# Patient Record
Sex: FEMALE | Race: WHITE | NOT HISPANIC OR LATINO | Employment: FULL TIME | URBAN - NONMETROPOLITAN AREA
[De-identification: names, ages, dates, MRNs, and addresses within clinical notes are randomized per-mention and may not be internally consistent; named-entity substitution may affect disease eponyms.]

---

## 2017-05-01 ENCOUNTER — OFFICE VISIT (OUTPATIENT)
Dept: RETAIL CLINIC | Facility: CLINIC | Age: 37
End: 2017-05-01

## 2017-05-01 VITALS
WEIGHT: 275 LBS | BODY MASS INDEX: 46.95 KG/M2 | HEART RATE: 110 BPM | TEMPERATURE: 99.3 F | OXYGEN SATURATION: 97 % | RESPIRATION RATE: 20 BRPM | HEIGHT: 64 IN

## 2017-05-01 DIAGNOSIS — J40 BRONCHITIS: Primary | ICD-10-CM

## 2017-05-01 PROCEDURE — 99203 OFFICE O/P NEW LOW 30 MIN: CPT | Performed by: NURSE PRACTITIONER

## 2017-05-01 PROCEDURE — 94640 AIRWAY INHALATION TREATMENT: CPT | Performed by: NURSE PRACTITIONER

## 2017-05-01 RX ORDER — AMOXICILLIN AND CLAVULANATE POTASSIUM 875; 125 MG/1; MG/1
1 TABLET, FILM COATED ORAL 2 TIMES DAILY
Qty: 20 TABLET | Refills: 0 | Status: SHIPPED | OUTPATIENT
Start: 2017-05-01 | End: 2017-05-11

## 2017-05-01 RX ORDER — BENZONATATE 100 MG/1
100 CAPSULE ORAL 3 TIMES DAILY PRN
Qty: 30 CAPSULE | Refills: 0 | Status: SHIPPED | OUTPATIENT
Start: 2017-05-01 | End: 2017-05-11

## 2017-05-01 RX ORDER — ALBUTEROL SULFATE 90 UG/1
2 AEROSOL, METERED RESPIRATORY (INHALATION) EVERY 4 HOURS PRN
Qty: 1 INHALER | Refills: 0 | Status: SHIPPED | OUTPATIENT
Start: 2017-05-01 | End: 2017-05-11

## 2017-05-01 RX ADMIN — Medication 0.5 MG: at 12:45

## 2017-07-27 ENCOUNTER — OFFICE VISIT (OUTPATIENT)
Dept: FAMILY MEDICINE CLINIC | Facility: CLINIC | Age: 37
End: 2017-07-27

## 2017-07-27 VITALS
WEIGHT: 266 LBS | DIASTOLIC BLOOD PRESSURE: 78 MMHG | HEIGHT: 64 IN | OXYGEN SATURATION: 98 % | HEART RATE: 92 BPM | BODY MASS INDEX: 45.41 KG/M2 | SYSTOLIC BLOOD PRESSURE: 112 MMHG

## 2017-07-27 DIAGNOSIS — K76.0 FATTY LIVER: ICD-10-CM

## 2017-07-27 DIAGNOSIS — E03.9 ACQUIRED HYPOTHYROIDISM: ICD-10-CM

## 2017-07-27 DIAGNOSIS — N92.6 IRREGULAR MENSES: ICD-10-CM

## 2017-07-27 DIAGNOSIS — E66.01 MORBID OBESITY, UNSPECIFIED OBESITY TYPE (HCC): ICD-10-CM

## 2017-07-27 DIAGNOSIS — R51.9 CHRONIC NONINTRACTABLE HEADACHE, UNSPECIFIED HEADACHE TYPE: ICD-10-CM

## 2017-07-27 DIAGNOSIS — G47.33 OSA (OBSTRUCTIVE SLEEP APNEA): ICD-10-CM

## 2017-07-27 DIAGNOSIS — R23.2 HOT FLASHES: ICD-10-CM

## 2017-07-27 DIAGNOSIS — IMO0002 UNCONTROLLED TYPE 2 DIABETES MELLITUS WITH DIABETIC NEUROPATHY, WITHOUT LONG-TERM CURRENT USE OF INSULIN: Primary | ICD-10-CM

## 2017-07-27 DIAGNOSIS — G89.29 CHRONIC NONINTRACTABLE HEADACHE, UNSPECIFIED HEADACHE TYPE: ICD-10-CM

## 2017-07-27 LAB — GLUCOSE BLDC GLUCOMTR-MCNC: 240 MG/DL (ref 70–130)

## 2017-07-27 PROCEDURE — 99204 OFFICE O/P NEW MOD 45 MIN: CPT | Performed by: FAMILY MEDICINE

## 2017-07-27 PROCEDURE — 82962 GLUCOSE BLOOD TEST: CPT | Performed by: FAMILY MEDICINE

## 2017-07-27 RX ORDER — GLIPIZIDE 10 MG/1
10 TABLET, FILM COATED, EXTENDED RELEASE ORAL DAILY
Refills: 0 | COMMUNITY
Start: 2017-07-11 | End: 2018-04-20 | Stop reason: SDUPTHER

## 2017-07-27 RX ORDER — SERTRALINE HYDROCHLORIDE 100 MG/1
100 TABLET, FILM COATED ORAL DAILY
Refills: 0 | COMMUNITY
Start: 2017-07-11 | End: 2018-04-20 | Stop reason: SDUPTHER

## 2017-07-27 RX ORDER — LEVOTHYROXINE SODIUM 200 MCG
200 TABLET ORAL DAILY
Refills: 0 | COMMUNITY
Start: 2017-07-11 | End: 2018-04-20 | Stop reason: SDUPTHER

## 2017-07-27 NOTE — PROGRESS NOTES
"Subjective   Tawanna Jenifer Arellano is a 37 y.o. female.     History of Present Illness  Ms. Arellano presents today as a new pt to establish care. SHe was previously followed by Roge Merritt. She has several chronic conditions in need of f/u. She was last seen by previous PCP 3-4 months ago. She recalls her last A1c was 11.    Diabetes Mellitus Type II, Follow-up: Patient here for follow-up of Type 2 diabetes mellitus.  Current symptoms/problems include hyperglycemia and paresthesia of the feet and have been worsening. Symptoms have been present for several months.      Known diabetic complications: peripheral neuropathy  Cardiovascular risk factors: diabetes mellitus, dyslipidemia, obesity (BMI >= 30 kg/m2) and sedentary lifestyle  Current diabetic medications include glipizide, metformin. Was not able to afford Invokana or Jardiance.  SHe has assoc'd h/o fatty liver.    Eye exam current (within one year): unknown  Weight trend: increasing steadily  Prior visit with dietician: no  Current diet: in general, a \"healthy\" diet    Current exercise: minimal walking    Current monitoring regimen: none   Any episodes of hypoglycemia? no    Is She on ACE inhibitor or angiotensin II receptor blocker?   No   Also not currently on a statin.    Hypothyroidism: Patient presents for evaluation of thyroid function. Symptoms consist of fatigue, weight gain, change in skin,  nails, or hair, heat intolerance, depression, amenorrhea. Symptoms have present for several years. The symptoms are moderate.  The problem has been waxing and waning.  Previous thyroid studies include TSH, triiodothyronine free and free thyroxine. The hypothyroidism is due to thyroidectomy. SHe is taking replacement as rx'd. DUe for recheck TSH.    She has h/o chronic obesity assoc'd with ARIEL. She had last sleep study several years ago. Was not able to tolerate CPAP at that time. SHe c/o chronic fatigue, hot flashes, chronic bitemporal headaches. Denies chronic sore " "throat. She has suspected h/o PCOS although she has never \"officially\" been given dx. She has h/o chronic irregular menses with recurrent secondary amenorrhea, hirsuitism, DM, etc.    The following portions of the patient's history were reviewed and updated as appropriate: allergies, current medications, past family history, past medical history, past social history, past surgical history and problem list.    Review of Systems   Constitutional: Positive for fatigue and unexpected weight change. Negative for appetite change and fever.   HENT: Negative for congestion, ear pain, hearing loss, nosebleeds, rhinorrhea, sneezing, sore throat, tinnitus and trouble swallowing.    Eyes: Positive for visual disturbance. Negative for pain, discharge and redness.   Respiratory: Negative for cough, chest tightness, shortness of breath and wheezing.    Cardiovascular: Negative for chest pain, palpitations and leg swelling.   Gastrointestinal: Negative for abdominal pain, blood in stool, constipation, diarrhea, nausea and vomiting.   Endocrine: Positive for heat intolerance. Negative for cold intolerance, polydipsia and polyuria.   Genitourinary: Negative for dysuria, flank pain, frequency, hematuria and urgency.   Musculoskeletal: Negative for arthralgias, back pain, joint swelling, myalgias and neck pain.   Skin: Negative for rash and wound.   Allergic/Immunologic: Negative for food allergies.   Neurological: Positive for weakness, numbness and headaches. Negative for dizziness, tremors, seizures, syncope and speech difficulty.   Hematological: Negative for adenopathy. Does not bruise/bleed easily.   Psychiatric/Behavioral: Positive for dysphoric mood and sleep disturbance. Negative for confusion and suicidal ideas. The patient is nervous/anxious.        Objective    Vitals:    07/27/17 0935   BP: 112/78   Pulse: 92   SpO2: 98%     Body mass index is 45.66 kg/(m^2).  Last 2 weights    07/27/17  0935   Weight: 266 lb (121 kg) "       Physical Exam   Constitutional: She is oriented to person, place, and time. She appears well-developed and well-nourished. She is cooperative. She does not appear ill. No distress.   Morbidly obese   HENT:   Head: Normocephalic and atraumatic.   Right Ear: Tympanic membrane, external ear and ear canal normal.   Left Ear: Tympanic membrane, external ear and ear canal normal.   Nose: Nose normal.   Mouth/Throat: Oropharynx is clear and moist and mucous membranes are normal. Mucous membranes are not dry. No oral lesions. No posterior oropharyngeal erythema.   Mallampati Class 3/4   Eyes: Conjunctivae, EOM and lids are normal. Right pupil is round and reactive. Left pupil is round and reactive. Pupils are unequal (right chronically larger than left).   Neck: Phonation normal. Neck supple. Normal carotid pulses present. No thyroid mass and no thyromegaly present.   thyroidectomy scar noted   Cardiovascular: Normal rate, regular rhythm, normal heart sounds and intact distal pulses.    Pulmonary/Chest: Effort normal and breath sounds normal.   Musculoskeletal:        Thoracic back: She exhibits deformity (kyphoptic posture).    Tawanna had a diabetic foot exam performed today.   During the foot exam she had a monofilament test performed (normal).    Vascular Status -  Her exam exhibits no right foot edema. Her exam exhibits no left foot edema.   Skin Integrity  -  Her right foot skin is intact.  She has callous right foot.  She hasno right foot ulcer.    Tawanna 's left foot skin is intact. She has callous left foot. She has no left foot ulcer..  Lymphadenopathy:     She has no cervical adenopathy.        Right: No supraclavicular adenopathy present.        Left: No supraclavicular adenopathy present.   Neurological: She is alert and oriented to person, place, and time. She has normal strength. She displays no tremor. No cranial nerve deficit or sensory deficit. Gait normal.   Skin: Skin is warm and dry. No bruising and  no rash noted.   Psychiatric: She has a normal mood and affect. Her speech is normal and behavior is normal. Thought content normal. Cognition and memory are normal.   Nursing note and vitals reviewed.      Assessment/Plan   Tawanna was seen today for establish care and tingling.    Diagnoses and all orders for this visit:    Uncontrolled type 2 diabetes mellitus with diabetic neuropathy, without long-term current use of insulin  -     CBC (No Diff)  -     Comprehensive Metabolic Panel  -     Hemoglobin A1c  -     Microalbumin / Creatinine Urine Ratio  -     Cortisol  -     linagliptin (TRADJENTA) 5 MG tablet tablet; Take 1 tablet by mouth Daily.  -     Ambulatory Referral to Diabetic Education    Acquired hypothyroidism  -     TSH  -     T4, Free  -     T3, Free  -     Ambulatory Referral to Diabetic Education    Morbid obesity, unspecified obesity type  -     Comprehensive Metabolic Panel  -     FSH & LH  -     Cortisol  -     Ambulatory Referral to Diabetic Education    Chronic nonintractable headache, unspecified headache type  -     CBC (No Diff)  -     Comprehensive Metabolic Panel  -     FSH & LH  -     Cortisol  -     Prolactin  -     Progesterone  -     Estradiol  -     17-Hydroxyprogesterone    Irregular menses  -     FSH & LH  -     Cortisol  -     Progesterone  -     Estradiol  -     17-Hydroxyprogesterone    Fatty liver  -     Comprehensive Metabolic Panel  -     Ambulatory Referral to Diabetic Education    ARIEL (obstructive sleep apnea)    Hot flashes  -     CBC (No Diff)  -     Comprehensive Metabolic Panel  -     FSH & LH  -     Cortisol  -     Prolactin  -     Progesterone  -     Estradiol  -     17-Hydroxyprogesterone    Uncontrolled NIDDM. Have rec'd we recheck A1c. I have reviewed risks/benefits and potential side effects of various treatment options. Pt voices understanding and wishes to proceed with trial of tradjenta. Re-inforced need for diabetic appropriate diet. Refer to diabetic education.  She is aware she may need basal insulin pending A1c review.    Hypothyroidism of unknown status. Has h/o thyroid cancer and radioactive iodine tx. Needs suppressed TSH.    Morbid obesity with suspected ARIEL as well as h/o fatty liver. Will review records. Will likely need f/u sleep study. As she has assoc'd headache, heat intolerance, etc- lab eval to r/o pituitary tumor. Nutrition referral as above.    I will contact patient regarding test results and provide instructions regarding any necessary changes in plan of care.  Routine f/u in 1 month, sooner as needed/instructed.  Records requested from previous primary provider as well as any consulting physician, admitting hospitals, etc. Further recommendations pending review.  Patient was encouraged to keep me informed of any acute changes, lack of improvement, or any new concerning symptoms.  Pt is aware of reasons to seek emergent care.  Patient voiced understanding of all instructions and denied further questions.

## 2017-08-02 LAB
17OHP SERPL-MCNC: 19 NG/DL
ALBUMIN SERPL-MCNC: 4 G/DL (ref 3.5–5)
ALBUMIN/CREAT UR: <7.8 MG/G CREAT (ref 0–30)
ALBUMIN/GLOB SERPL: 1.3 G/DL (ref 1–2)
ALP SERPL-CCNC: 91 U/L (ref 38–126)
ALT SERPL-CCNC: 50 U/L (ref 13–69)
AST SERPL-CCNC: 34 U/L (ref 15–46)
BILIRUB SERPL-MCNC: 0.5 MG/DL (ref 0.2–1.3)
BUN SERPL-MCNC: 10 MG/DL (ref 7–20)
BUN/CREAT SERPL: 16.7 (ref 7.1–23.5)
CALCIUM SERPL-MCNC: 9.2 MG/DL (ref 8.4–10.2)
CHLORIDE SERPL-SCNC: 99 MMOL/L (ref 98–107)
CO2 SERPL-SCNC: 27 MMOL/L (ref 26–30)
CORTIS SERPL-MCNC: 5.1 UG/DL
CREAT SERPL-MCNC: 0.6 MG/DL (ref 0.6–1.3)
CREAT UR-MCNC: 38.3 MG/DL
ERYTHROCYTE [DISTWIDTH] IN BLOOD BY AUTOMATED COUNT: 14.6 % (ref 11.5–14.5)
ESTRADIOL SERPL-MCNC: 30.3 PG/ML
FSH SERPL-ACNC: 5.2 MIU/ML
GLOBULIN SER CALC-MCNC: 3 GM/DL
GLUCOSE SERPL-MCNC: 216 MG/DL (ref 74–98)
HBA1C MFR BLD: 9.5 %
HCT VFR BLD AUTO: 43.5 % (ref 37–47)
HGB BLD-MCNC: 13.6 G/DL (ref 12–16)
LH SERPL-ACNC: 9.1 MIU/ML
MCH RBC QN AUTO: 26.1 PG (ref 27–31)
MCHC RBC AUTO-ENTMCNC: 31.3 G/DL (ref 30–37)
MCV RBC AUTO: 83.5 FL (ref 81–99)
MICROALBUMIN UR-MCNC: <3 UG/ML
PLATELET # BLD AUTO: 194 10*3/MM3 (ref 130–400)
POTASSIUM SERPL-SCNC: 4.2 MMOL/L (ref 3.5–5.1)
PROGEST SERPL-MCNC: <0.1 NG/ML
PROLACTIN SERPL-MCNC: 9.5 NG/ML (ref 4.8–23.3)
PROT SERPL-MCNC: 7 G/DL (ref 6.3–8.2)
RBC # BLD AUTO: 5.21 10*6/MM3 (ref 4.2–5.4)
SODIUM SERPL-SCNC: 139 MMOL/L (ref 137–145)
T3FREE SERPL-MCNC: 3 PG/ML (ref 2–4.4)
T4 FREE SERPL-MCNC: 1.67 NG/DL (ref 0.78–2.19)
TSH SERPL DL<=0.005 MIU/L-ACNC: 1.45 MIU/ML (ref 0.47–4.68)
WBC # BLD AUTO: 12.27 10*3/MM3 (ref 4.8–10.8)

## 2017-08-03 ENCOUNTER — TELEPHONE (OUTPATIENT)
Dept: FAMILY MEDICINE CLINIC | Facility: CLINIC | Age: 37
End: 2017-08-03

## 2017-08-03 DIAGNOSIS — E03.9 ACQUIRED HYPOTHYROIDISM: Primary | ICD-10-CM

## 2017-08-03 RX ORDER — LEVOTHYROXINE SODIUM 200 MCG
200 TABLET ORAL DAILY
Qty: 90 TABLET | Refills: 0 | Status: CANCELLED | OUTPATIENT
Start: 2017-08-03

## 2017-08-03 RX ORDER — SERTRALINE HYDROCHLORIDE 100 MG/1
100 TABLET, FILM COATED ORAL DAILY
Qty: 180 TABLET | Refills: 0 | Status: CANCELLED | OUTPATIENT
Start: 2017-08-03

## 2017-08-03 RX ORDER — GLIPIZIDE 10 MG/1
10 TABLET, FILM COATED, EXTENDED RELEASE ORAL DAILY
Qty: 90 TABLET | Refills: 0 | Status: CANCELLED | OUTPATIENT
Start: 2017-08-03

## 2017-08-03 NOTE — TELEPHONE ENCOUNTER
I spoke with the pharmacy and they stated that it dose not require a PA, that her insurance has a high deduct able and until its met it will not be any cheaper.          ----- Message from Tawanna Arellano sent at 8/3/2017 10:33 AM EDT -----  Regarding: RE: Prescription Question  Contact: [phone]  Thank you  ----- Message -----  From: Alia Shah MD  Sent: 8/3/2017  8:01 AM EDT  To: Tawanna Arellano  Subject: RE: Prescription Question    I will have Amee check your insurance formulary and we'll see what else we can do.      ----- Message -----     From: Tawanna Arellano     Sent: 8/2/2017  6:03 PM EDT       To: Alia Shah MD  Subject: Prescription Question    Alpesh Shah,         I got the results of my labs. I wanted to let you know that I was not able to get the prescription for tradjenta. It too was over $300. I did see that my A1C was 9.5 and that's down from 11.         Tawanna

## 2017-08-03 NOTE — TELEPHONE ENCOUNTER
Please check diabetes meds on pt's formulary (or have pt do so) as she was not able to afford the Tradjenta ($300).

## 2017-08-06 NOTE — TELEPHONE ENCOUNTER
Please let pt know the situation. She will need to let us know what meds are preferred for diabetes on her plan.

## 2017-08-21 PROBLEM — E55.9 VITAMIN D DEFICIENCY: Status: ACTIVE | Noted: 2017-08-21

## 2017-08-29 ENCOUNTER — OFFICE VISIT (OUTPATIENT)
Dept: FAMILY MEDICINE CLINIC | Facility: CLINIC | Age: 37
End: 2017-08-29

## 2017-08-29 VITALS
OXYGEN SATURATION: 97 % | DIASTOLIC BLOOD PRESSURE: 80 MMHG | SYSTOLIC BLOOD PRESSURE: 112 MMHG | HEART RATE: 93 BPM | BODY MASS INDEX: 45.24 KG/M2 | WEIGHT: 265 LBS | HEIGHT: 64 IN

## 2017-08-29 DIAGNOSIS — IMO0002 UNCONTROLLED TYPE 2 DIABETES MELLITUS WITH DIABETIC NEUROPATHY, WITHOUT LONG-TERM CURRENT USE OF INSULIN: Primary | ICD-10-CM

## 2017-08-29 PROCEDURE — 99213 OFFICE O/P EST LOW 20 MIN: CPT | Performed by: FAMILY MEDICINE

## 2017-08-29 NOTE — PROGRESS NOTES
Subjective   Tawanna Jenifer Arellano is a 37 y.o. female.     History of Present Illness  Mrs. Arellano presents today for f/u on diabetes. She was seen as new pt last month. Had updated labs and would like to review those. She was not able to afford the Januvia or Tradjenta as she has a high pharmacy copay with her insurance. She would like info about other options. She is otherwise taking meds as rx'd.     Last note reviewed.    The following portions of the patient's history were reviewed and updated as appropriate: allergies, current medications, past family history, past medical history, past social history, past surgical history and problem list.    Review of Systems   Constitutional: Positive for fatigue. Negative for appetite change and fever.   HENT: Negative for congestion, rhinorrhea, sore throat and trouble swallowing.    Respiratory: Negative for cough, shortness of breath and wheezing.    Cardiovascular: Negative for chest pain, palpitations and leg swelling.   Gastrointestinal: Negative for abdominal pain, diarrhea, nausea and vomiting.   Endocrine: Positive for heat intolerance. Negative for cold intolerance, polydipsia and polyuria.   Genitourinary: Negative for dysuria, frequency and hematuria.   Skin: Negative for rash and wound.   Neurological: Positive for weakness, numbness and headaches. Negative for dizziness, tremors and syncope.   Psychiatric/Behavioral: Positive for dysphoric mood and sleep disturbance. Negative for confusion and suicidal ideas. The patient is nervous/anxious.        Objective    Vitals:    08/29/17 1013   BP: 112/80   Pulse: 93   SpO2: 97%     Body mass index is 45.49 kg/(m^2).  Last 2 weights    08/29/17  1013   Weight: 265 lb (120 kg)       Physical Exam   Constitutional: She is oriented to person, place, and time. She appears well-developed and well-nourished. She is cooperative. She does not appear ill. No distress.   obese   HENT:   Mouth/Throat: Mucous membranes are normal.  Mucous membranes are not dry.   Neck: No thyroid mass and no thyromegaly present.   Cardiovascular: Normal rate, regular rhythm and intact distal pulses.    Pulmonary/Chest: Effort normal and breath sounds normal.       Vascular Status -  Her exam exhibits no right foot edema. Her exam exhibits no left foot edema.  Lymphadenopathy:     She has no cervical adenopathy.   Neurological: She is alert and oriented to person, place, and time. She displays no tremor. Gait normal.   Skin: Skin is warm and dry.   Psychiatric: She has a normal mood and affect. Her behavior is normal.   Nursing note and vitals reviewed.    Recent Results (from the past 840 hour(s))   CBC (No Diff)    Collection Time: 07/27/17 10:28 AM   Result Value Ref Range    WBC 12.27 (H) 4.80 - 10.80 10*3/mm3    RBC 5.21 4.20 - 5.40 10*6/mm3    Hemoglobin 13.6 12.0 - 16.0 g/dL    Hematocrit 43.5 37.0 - 47.0 %    MCV 83.5 81.0 - 99.0 fL    MCH 26.1 (L) 27.0 - 31.0 pg    MCHC 31.3 30.0 - 37.0 g/dL    RDW 14.6 (H) 11.5 - 14.5 %    Platelets 194 130 - 400 10*3/mm3   Comprehensive Metabolic Panel    Collection Time: 07/27/17 10:28 AM   Result Value Ref Range    Glucose 216 (H) 74 - 98 mg/dL    BUN 10 7 - 20 mg/dL    Creatinine 0.60 0.60 - 1.30 mg/dL    eGFR Non African Am 112 >60 mL/min/1.73    eGFR African Am 136 >60 mL/min/1.73    BUN/Creatinine Ratio 16.7 7.1 - 23.5    Sodium 139 137 - 145 mmol/L    Potassium 4.2 3.5 - 5.1 mmol/L    Chloride 99 98 - 107 mmol/L    Total CO2 27.0 26.0 - 30.0 mmol/L    Calcium 9.2 8.4 - 10.2 mg/dL    Total Protein 7.0 6.3 - 8.2 g/dL    Albumin 4.00 3.50 - 5.00 g/dL    Globulin 3.0 gm/dL    A/G Ratio 1.3 1.0 - 2.0 g/dL    Total Bilirubin 0.5 0.2 - 1.3 mg/dL    Alkaline Phosphatase 91 38 - 126 U/L    AST (SGOT) 34 15 - 46 U/L    ALT (SGPT) 50 13 - 69 U/L   Hemoglobin A1c    Collection Time: 07/27/17 10:28 AM   Result Value Ref Range    Hemoglobin A1C 9.50 %   Microalbumin / Creatinine Urine Ratio    Collection Time: 07/27/17  10:28 AM   Result Value Ref Range    Creatinine, Urine 38.3 Not Estab. mg/dL    Microalbumin, Urine <3.0 Not Estab. ug/mL    Microalbumin/Creatinine Ratio Urine <7.8 0.0 - 30.0 mg/g creat   FSH & LH    Collection Time: 07/27/17 10:28 AM   Result Value Ref Range    LH 9.1 mIU/mL    FSH 5.2 mIU/mL   Cortisol    Collection Time: 07/27/17 10:28 AM   Result Value Ref Range    Cortisol 5.1 ug/dL   Prolactin    Collection Time: 07/27/17 10:28 AM   Result Value Ref Range    Prolactin 9.5 4.8 - 23.3 ng/mL   TSH    Collection Time: 07/27/17 10:28 AM   Result Value Ref Range    TSH 1.450 0.470 - 4.680 mIU/mL   T4, Free    Collection Time: 07/27/17 10:28 AM   Result Value Ref Range    Free T4 1.67 0.78 - 2.19 ng/dL   T3, Free    Collection Time: 07/27/17 10:28 AM   Result Value Ref Range    T3, Free 3.0 2.0 - 4.4 pg/mL   Progesterone    Collection Time: 07/27/17 10:28 AM   Result Value Ref Range    Progesterone <0.1 ng/mL   Estradiol    Collection Time: 07/27/17 10:28 AM   Result Value Ref Range    Estradiol 30.3 pg/mL   17-Hydroxyprogesterone    Collection Time: 07/27/17 10:28 AM   Result Value Ref Range    17-Hydroxyprogesterone 19 ng/dL   POC Glucose Fingerstick    Collection Time: 07/27/17 11:28 AM   Result Value Ref Range    Glucose 240 (A) 70 - 130 mg/dL       Assessment/Plan   Tawanna was seen today for diabetes.    Diagnoses and all orders for this visit:    Uncontrolled type 2 diabetes mellitus with diabetic neuropathy, without long-term current use of insulin  -     Canagliflozin (INVOKANA) 100 MG tablet; Take 1 tablet by mouth Daily.    Uncontrolled diabetes with limited resources in regard payment for medications. A1c has dropped to 9.5 from previous of 11. She will continue glipizide and metformin. As she did well on Invokana previously have sent in new rx and printed pt assistance form for her as well to review with pharmacist. She will let me know if she is unable to fill.    Routine f/u in 2 months with repeat A1c  at that time.   Patient was encouraged to keep me informed of any acute changes, lack of improvement, or any new concerning symptoms.  Patient voiced understanding of all instructions and denied further questions.

## 2017-08-31 ENCOUNTER — APPOINTMENT (OUTPATIENT)
Dept: DIABETES SERVICES | Facility: HOSPITAL | Age: 37
End: 2017-08-31

## 2017-09-07 ENCOUNTER — TELEPHONE (OUTPATIENT)
Dept: FAMILY MEDICINE CLINIC | Facility: CLINIC | Age: 37
End: 2017-09-07

## 2017-09-07 NOTE — TELEPHONE ENCOUNTER
----- Message from Keila Worley MA sent at 9/7/2017  1:00 PM EDT -----  Regarding: FW: Invokana approval  There is a message in ryann box that says that the pt has a deductible and until deductible is met then it will be a higher co pay    ----- Message -----     From: Alia Shha MD     Sent: 9/7/2017  12:23 PM       To: Keila Worley MA  Subject: Invokana approval                                Can you check and see if there are any faxed forms from [facility] or pt's insurance company regarding Invokana? Pt claims they have sent us several but I have not seen any come across my desk. Thanks.

## 2017-09-07 NOTE — TELEPHONE ENCOUNTER
Yes, we have been going back and forth with pt regarding medications but she made it sound like [facility] had sent over a form recently (since last visit). Please check with [facility] regarding anything new they might have sent.

## 2018-04-20 ENCOUNTER — OFFICE VISIT (OUTPATIENT)
Dept: FAMILY MEDICINE CLINIC | Facility: CLINIC | Age: 38
End: 2018-04-20

## 2018-04-20 VITALS
WEIGHT: 263 LBS | OXYGEN SATURATION: 98 % | BODY MASS INDEX: 44.9 KG/M2 | HEART RATE: 84 BPM | DIASTOLIC BLOOD PRESSURE: 72 MMHG | SYSTOLIC BLOOD PRESSURE: 112 MMHG | HEIGHT: 64 IN

## 2018-04-20 DIAGNOSIS — IMO0002 UNCONTROLLED TYPE 2 DIABETES MELLITUS WITH DIABETIC NEUROPATHY, WITHOUT LONG-TERM CURRENT USE OF INSULIN: Primary | ICD-10-CM

## 2018-04-20 DIAGNOSIS — K76.0 FATTY LIVER: ICD-10-CM

## 2018-04-20 DIAGNOSIS — E03.9 ACQUIRED HYPOTHYROIDISM: ICD-10-CM

## 2018-04-20 LAB — GLUCOSE BLDC GLUCOMTR-MCNC: 226 MG/DL (ref 70–130)

## 2018-04-20 PROCEDURE — 99214 OFFICE O/P EST MOD 30 MIN: CPT | Performed by: FAMILY MEDICINE

## 2018-04-20 PROCEDURE — 82962 GLUCOSE BLOOD TEST: CPT | Performed by: FAMILY MEDICINE

## 2018-04-20 RX ORDER — GLIPIZIDE 10 MG/1
10 TABLET, FILM COATED, EXTENDED RELEASE ORAL DAILY
Qty: 30 TABLET | Refills: 5 | Status: SHIPPED | OUTPATIENT
Start: 2018-04-20 | End: 2018-12-13 | Stop reason: SDUPTHER

## 2018-04-20 RX ORDER — LEVOTHYROXINE SODIUM 200 MCG
200 TABLET ORAL DAILY
Qty: 30 TABLET | Refills: 5 | Status: SHIPPED | OUTPATIENT
Start: 2018-04-20 | End: 2019-01-21 | Stop reason: SDUPTHER

## 2018-04-20 RX ORDER — SERTRALINE HYDROCHLORIDE 100 MG/1
100 TABLET, FILM COATED ORAL DAILY
Qty: 30 TABLET | Refills: 5 | Status: SHIPPED | OUTPATIENT
Start: 2018-04-20

## 2018-04-20 NOTE — PATIENT INSTRUCTIONS
CHECK WITH [facility]    CHECK WITH INSURANCE ABOUT COVERAGE OF PNEUMONIA VACCINE    CHECK FOR APPOINTMENT WITH GYN BUT IF NOT AVAILABLE LET US KNOW - CHECK ABOUT PREVENTIVE VISIT COVERAGE/PAP SMEAR, ETC

## 2018-04-20 NOTE — PROGRESS NOTES
Subjective   Tawanna Jenifer Arellano is a 37 y.o. female.     History of Present Illness  Mrs. Arellano presents today for follow-up on her diabetes.  She was seen as a new patient July 2017.  Had one subsequent visit 1 month later. In the interim had a follow-up with [facility] endocrinology for continued surveillance of previous thyroid cancer.  She is concerned about losing her insurance at the end of the month and not being able to afford her medications.  Even with insurance she has high co-pays for brand name medications.  She has been switched to Jardiance as she had difficulty obtaining Invokana.  She discontinued metformin as she has not been able to tolerate it even at low dose.  She has continued her glipizide 10 mg daily.  Blood sugars currently 130-150.  Off of Jardiance her blood sugars jumped over 300.    Comorbid fatty liver and chronic obesity.  Denies abd pain, jaundice. Due for recheck LFTs.    History of thyroid cancer status post thyroidectomy currently on replacement for hypothyroidism. Taking med as rx'd.  Due for recheck TSH.  Last follow up with  fall 2017.    The following portions of the patient's history were reviewed and updated as appropriate: allergies, current medications, past family history, past medical history, past social history, past surgical history and problem list.    Review of Systems   Constitutional: Positive for fatigue. Negative for appetite change and fever.   HENT: Negative for congestion, rhinorrhea, sore throat and trouble swallowing.    Respiratory: Negative for cough, shortness of breath and wheezing.    Cardiovascular: Negative for chest pain, palpitations and leg swelling.   Gastrointestinal: Negative for abdominal pain, diarrhea, nausea and vomiting.   Endocrine: Positive for heat intolerance. Negative for cold intolerance, polydipsia and polyuria.   Genitourinary: Negative for dysuria, frequency and hematuria.   Skin: Negative for rash and wound.    Neurological: Positive for weakness, numbness and headaches. Negative for dizziness, tremors and syncope.   Psychiatric/Behavioral: Positive for dysphoric mood and sleep disturbance. Negative for confusion and suicidal ideas. The patient is nervous/anxious.        Objective    Vitals:    04/20/18 1103   BP: 112/72   Pulse: 84   SpO2: 98%     Body mass index is 45.14 kg/m².  1    04/20/18  1103   Weight: 119 kg (263 lb)       Physical Exam   Constitutional: She is oriented to person, place, and time. She appears well-developed and well-nourished. She is cooperative. She does not appear ill. No distress.   Morbidly obese   HENT:   Head: Normocephalic and atraumatic.   Mouth/Throat: Oropharynx is clear and moist and mucous membranes are normal. Mucous membranes are not dry.   Eyes: Conjunctivae and lids are normal.   Cardiovascular: Normal rate, regular rhythm, normal heart sounds and intact distal pulses.    No peripheral edema   Pulmonary/Chest: Effort normal and breath sounds normal.    Tawanna had a diabetic foot exam performed today.   During the foot exam she had a monofilament test performed (normal).  Skin Integrity  -  Her right foot skin is intact. She has callous right foot.  She has no right foot ulcer.Her left foot skin is intact.She has callous left foot. She has no left foot ulcer..  Neurological: She is alert and oriented to person, place, and time. Gait normal.   Skin: Skin is warm and dry. No rash noted.   Psychiatric: She has a normal mood and affect. Her behavior is normal.   Nursing note and vitals reviewed.    Recent Results (from the past 24 hour(s))   POC Glucose    Collection Time: 04/20/18 11:58 AM   Result Value Ref Range    Glucose 226 (A) 70 - 130 mg/dL     Lab Results   Component Value Date    WBC 12.27 (H) 07/27/2017    HGB 13.6 07/27/2017    HCT 43.5 07/27/2017    MCV 83.5 07/27/2017     07/27/2017     Lab Results   Component Value Date    BUN 10 07/27/2017    CREATININE 0.60  "07/27/2017    EGFRIFNONA 112 07/27/2017    EGFRIFAFRI 136 07/27/2017    BCR 16.7 07/27/2017    K 4.2 07/27/2017    CO2 27.0 07/27/2017    CALCIUM 9.2 07/27/2017    PROTENTOTREF 7.0 07/27/2017    ALBUMIN 4.00 07/27/2017    LABIL2 1.3 07/27/2017    AST 34 07/27/2017    ALT 50 07/27/2017     Lab Results   Component Value Date    HGBA1C 9.50 07/27/2017     Lab Results   Component Value Date    MICROALBUR <3.0 07/27/2017     Assessment/Plan   Tawanna was seen today for diabetes and hypothyroidism.    Diagnoses and all orders for this visit:    Uncontrolled type 2 diabetes mellitus with diabetic neuropathy, without long-term current use of insulin  -     Hemoglobin A1c  -     BUN  -     Creatinine, serum  -     POC Glucose    Acquired hypothyroidism  -     TSH Rfx On Abnormal To Free T4    Fatty liver  -     ALT  -     AST    Other orders  -     glipiZIDE (GLUCOTROL XL) 10 MG 24 hr tablet; Take 1 tablet by mouth Daily.  -     SYNTHROID 200 MCG tablet; Take 1 tablet by mouth Daily.  -     sertraline (ZOLOFT) 100 MG tablet; Take 1 tablet by mouth Daily.    Uncontrolled non-insulin-dependent diabetes with difficulty affording current medication regimen.  She is not able to tolerate metformin.  Is currently on glipizide.  Is doing well on Jardiance.  She wishes to \"have something cheaper but like Jardiance\" so that she can afford without insurance.  I have informed her that unfortunately this category does not exist.  I reviewed $4 medications with her at local pharmacies and there are limited options considering her intolerance of certain medications.  We've discussed options including seeking care at [facility] here in [place] as they may have a sliding scale/self pay arrangement which she would find more affordable.  In addition she is encouraged to follow-up with [facility] endocrinology as a they likely have a self-pay arrangement as well.  I have also reviewed with her that she may qualify for patient " assistance programs through drug company.    She reports being overdue for routine physical/Pap smear/breast exam.  She wishes to schedule this with OB/GYN.  She does not have a current OB/GYN provider.  Have encouraged her to schedule wellness exam with us prior to losing her insurance if she is unable to establish care elsewhere prior to that time.    Continue thyroid replacement and follow-up with [facility] as scheduled.    Pneumococcal vaccination indicated.  She is amenable to having but would like to check with her insurance to ensure discovered.  She'll drop by the clinic at her earliest convenience as she wishes.    I will contact patient regarding test results and provide instructions regarding any necessary changes in plan of care.  Pt advised to eat a heart healthy diet and get regular aerobic exercise.  Patient was encouraged to keep me informed of any acute changes, lack of improvement, or any new concerning symptoms.  Pt is aware of reasons to seek emergent care.  Patient voiced understanding of all instructions and denied further questions.

## 2018-04-21 LAB
ALT SERPL-CCNC: 39 U/L (ref 13–69)
AST SERPL-CCNC: 26 U/L (ref 15–46)
BUN SERPL-MCNC: 13 MG/DL (ref 7–20)
CREAT SERPL-MCNC: 0.6 MG/DL (ref 0.6–1.3)
GFR SERPLBLD CREATININE-BSD FMLA CKD-EPI: 112 ML/MIN/1.73
GFR SERPLBLD CREATININE-BSD FMLA CKD-EPI: 136 ML/MIN/1.73
HBA1C MFR BLD: 8.6 %
T4 FREE SERPL-MCNC: 1.63 NG/DL (ref 0.78–2.19)
TSH SERPL DL<=0.005 MIU/L-ACNC: 0.3 MIU/ML (ref 0.47–4.68)

## 2018-12-13 RX ORDER — GLIPIZIDE 10 MG/1
10 TABLET, FILM COATED, EXTENDED RELEASE ORAL DAILY
Qty: 30 TABLET | Refills: 0 | Status: ON HOLD | OUTPATIENT
Start: 2018-12-13 | End: 2022-10-27

## 2019-01-21 RX ORDER — LEVOTHYROXINE SODIUM 200 MCG
200 TABLET ORAL DAILY
Qty: 30 TABLET | Refills: 0 | Status: SHIPPED | OUTPATIENT
Start: 2019-01-21 | End: 2022-11-06 | Stop reason: HOSPADM

## 2022-10-27 ENCOUNTER — APPOINTMENT (OUTPATIENT)
Dept: OTHER | Facility: HOSPITAL | Age: 42
End: 2022-10-27

## 2022-10-27 ENCOUNTER — HOSPITAL ENCOUNTER (INPATIENT)
Facility: HOSPITAL | Age: 42
LOS: 10 days | Discharge: HOME | End: 2022-11-06
Attending: INTERNAL MEDICINE | Admitting: INTERNAL MEDICINE

## 2022-10-27 DIAGNOSIS — I25.110 CORONARY ARTERY DISEASE INVOLVING NATIVE CORONARY ARTERY OF NATIVE HEART WITH UNSTABLE ANGINA PECTORIS: Primary | ICD-10-CM

## 2022-10-27 DIAGNOSIS — Z00.6 ENCOUNTER FOR EXAMINATION FOR NORMAL COMPARISON AND CONTROL IN CLINICAL RESEARCH PROGRAM: ICD-10-CM

## 2022-10-27 PROBLEM — E11.65 TYPE 2 DIABETES MELLITUS WITH HYPERGLYCEMIA, WITH LONG-TERM CURRENT USE OF INSULIN: Status: ACTIVE | Noted: 2017-07-27

## 2022-10-27 PROBLEM — I25.10 CORONARY ARTERY DISEASE: Status: ACTIVE | Noted: 2022-10-27

## 2022-10-27 PROBLEM — Z79.4 TYPE 2 DIABETES MELLITUS WITH HYPERGLYCEMIA, WITH LONG-TERM CURRENT USE OF INSULIN (HCC): Status: ACTIVE | Noted: 2017-07-27

## 2022-10-27 PROBLEM — G47.33 OSA (OBSTRUCTIVE SLEEP APNEA): Status: RESOLVED | Noted: 2017-07-27 | Resolved: 2022-10-27

## 2022-10-27 LAB
ALBUMIN SERPL-MCNC: 3.7 G/DL (ref 3.5–5.2)
ALBUMIN/GLOB SERPL: 1.1 G/DL
ALP SERPL-CCNC: 82 U/L (ref 39–117)
ALT SERPL W P-5'-P-CCNC: 29 U/L (ref 1–33)
ANION GAP SERPL CALCULATED.3IONS-SCNC: 12 MMOL/L (ref 5–15)
APTT PPP: 40.1 SECONDS (ref 60–90)
AST SERPL-CCNC: 18 U/L (ref 1–32)
BASOPHILS # BLD AUTO: 0.1 10*3/MM3 (ref 0–0.2)
BASOPHILS NFR BLD AUTO: 0.7 % (ref 0–1.5)
BILIRUB SERPL-MCNC: 0.3 MG/DL (ref 0–1.2)
BUN SERPL-MCNC: 13 MG/DL (ref 6–20)
BUN/CREAT SERPL: 18.8 (ref 7–25)
CALCIUM SPEC-SCNC: 9 MG/DL (ref 8.6–10.5)
CHLORIDE SERPL-SCNC: 97 MMOL/L (ref 98–107)
CO2 SERPL-SCNC: 27 MMOL/L (ref 22–29)
CREAT SERPL-MCNC: 0.69 MG/DL (ref 0.57–1)
DEPRECATED RDW RBC AUTO: 41.1 FL (ref 37–54)
EGFRCR SERPLBLD CKD-EPI 2021: 111.3 ML/MIN/1.73
EOSINOPHIL # BLD AUTO: 0.24 10*3/MM3 (ref 0–0.4)
EOSINOPHIL NFR BLD AUTO: 1.6 % (ref 0.3–6.2)
ERYTHROCYTE [DISTWIDTH] IN BLOOD BY AUTOMATED COUNT: 13.4 % (ref 12.3–15.4)
GLOBULIN UR ELPH-MCNC: 3.5 GM/DL
GLUCOSE BLDC GLUCOMTR-MCNC: 323 MG/DL (ref 70–130)
GLUCOSE SERPL-MCNC: 278 MG/DL (ref 65–99)
HBA1C MFR BLD: 11.8 % (ref 4.8–5.6)
HCT VFR BLD AUTO: 41.9 % (ref 34–46.6)
HGB BLD-MCNC: 14.1 G/DL (ref 12–15.9)
IMM GRANULOCYTES # BLD AUTO: 0.22 10*3/MM3 (ref 0–0.05)
IMM GRANULOCYTES NFR BLD AUTO: 1.5 % (ref 0–0.5)
LYMPHOCYTES # BLD AUTO: 2.61 10*3/MM3 (ref 0.7–3.1)
LYMPHOCYTES NFR BLD AUTO: 17.7 % (ref 19.6–45.3)
MAGNESIUM SERPL-MCNC: 1.6 MG/DL (ref 1.6–2.6)
MCH RBC QN AUTO: 28.3 PG (ref 26.6–33)
MCHC RBC AUTO-ENTMCNC: 33.7 G/DL (ref 31.5–35.7)
MCV RBC AUTO: 84.1 FL (ref 79–97)
MONOCYTES # BLD AUTO: 0.78 10*3/MM3 (ref 0.1–0.9)
MONOCYTES NFR BLD AUTO: 5.3 % (ref 5–12)
NEUTROPHILS NFR BLD AUTO: 10.81 10*3/MM3 (ref 1.7–7)
NEUTROPHILS NFR BLD AUTO: 73.2 % (ref 42.7–76)
NRBC BLD AUTO-RTO: 0 /100 WBC (ref 0–0.2)
PLATELET # BLD AUTO: 188 10*3/MM3 (ref 140–450)
PMV BLD AUTO: 12.7 FL (ref 6–12)
POTASSIUM SERPL-SCNC: 4.1 MMOL/L (ref 3.5–5.2)
PROT SERPL-MCNC: 7.2 G/DL (ref 6–8.5)
RBC # BLD AUTO: 4.98 10*6/MM3 (ref 3.77–5.28)
SODIUM SERPL-SCNC: 136 MMOL/L (ref 136–145)
TROPONIN T SERPL-MCNC: 0.01 NG/ML (ref 0–0.03)
UFH PPP CHRO-ACNC: 0.1 IU/ML (ref 0.3–0.7)
WBC NRBC COR # BLD: 14.76 10*3/MM3 (ref 3.4–10.8)

## 2022-10-27 PROCEDURE — 85025 COMPLETE CBC W/AUTO DIFF WBC: CPT | Performed by: INTERNAL MEDICINE

## 2022-10-27 PROCEDURE — 82962 GLUCOSE BLOOD TEST: CPT

## 2022-10-27 PROCEDURE — 93005 ELECTROCARDIOGRAM TRACING: CPT | Performed by: INTERNAL MEDICINE

## 2022-10-27 PROCEDURE — 80053 COMPREHEN METABOLIC PANEL: CPT | Performed by: INTERNAL MEDICINE

## 2022-10-27 PROCEDURE — 85520 HEPARIN ASSAY: CPT | Performed by: INTERNAL MEDICINE

## 2022-10-27 PROCEDURE — 85730 THROMBOPLASTIN TIME PARTIAL: CPT | Performed by: INTERNAL MEDICINE

## 2022-10-27 PROCEDURE — 84484 ASSAY OF TROPONIN QUANT: CPT | Performed by: INTERNAL MEDICINE

## 2022-10-27 PROCEDURE — 63710000001 INSULIN DETEMIR PER 5 UNITS: Performed by: INTERNAL MEDICINE

## 2022-10-27 PROCEDURE — 83036 HEMOGLOBIN GLYCOSYLATED A1C: CPT | Performed by: INTERNAL MEDICINE

## 2022-10-27 PROCEDURE — 25010000002 HEPARIN (PORCINE) 25000-0.45 UT/250ML-% SOLUTION: Performed by: INTERNAL MEDICINE

## 2022-10-27 PROCEDURE — 83735 ASSAY OF MAGNESIUM: CPT | Performed by: INTERNAL MEDICINE

## 2022-10-27 PROCEDURE — 99223 1ST HOSP IP/OBS HIGH 75: CPT | Performed by: INTERNAL MEDICINE

## 2022-10-27 PROCEDURE — 93010 ELECTROCARDIOGRAM REPORT: CPT | Performed by: INTERNAL MEDICINE

## 2022-10-27 RX ORDER — BUSPIRONE HYDROCHLORIDE 5 MG/1
5 TABLET ORAL 3 TIMES DAILY
COMMUNITY

## 2022-10-27 RX ORDER — NALOXONE HCL 0.4 MG/ML
0.4 VIAL (ML) INJECTION
Status: DISCONTINUED | OUTPATIENT
Start: 2022-10-27 | End: 2022-11-01

## 2022-10-27 RX ORDER — SODIUM CHLORIDE 0.9 % (FLUSH) 0.9 %
10 SYRINGE (ML) INJECTION AS NEEDED
Status: DISCONTINUED | OUTPATIENT
Start: 2022-10-27 | End: 2022-11-06 | Stop reason: HOSPADM

## 2022-10-27 RX ORDER — HEPARIN SODIUM 10000 [USP'U]/100ML
19 INJECTION, SOLUTION INTRAVENOUS
Status: DISPENSED | OUTPATIENT
Start: 2022-10-27 | End: 2022-11-01

## 2022-10-27 RX ORDER — ONDANSETRON 2 MG/ML
4 INJECTION INTRAMUSCULAR; INTRAVENOUS EVERY 6 HOURS PRN
Status: DISCONTINUED | OUTPATIENT
Start: 2022-10-27 | End: 2022-11-01

## 2022-10-27 RX ORDER — FAMOTIDINE 20 MG/1
20 TABLET, FILM COATED ORAL
Status: DISCONTINUED | OUTPATIENT
Start: 2022-10-28 | End: 2022-11-06 | Stop reason: HOSPADM

## 2022-10-27 RX ORDER — HYDROMORPHONE HYDROCHLORIDE 1 MG/ML
0.5 INJECTION, SOLUTION INTRAMUSCULAR; INTRAVENOUS; SUBCUTANEOUS
Status: DISCONTINUED | OUTPATIENT
Start: 2022-10-27 | End: 2022-11-01

## 2022-10-27 RX ORDER — NITROGLYCERIN 0.4 MG/1
0.4 TABLET SUBLINGUAL
Status: DISCONTINUED | OUTPATIENT
Start: 2022-10-27 | End: 2022-11-01

## 2022-10-27 RX ORDER — BUSPIRONE HYDROCHLORIDE 5 MG/1
5 TABLET ORAL 3 TIMES DAILY
Status: DISCONTINUED | OUTPATIENT
Start: 2022-10-27 | End: 2022-11-01

## 2022-10-27 RX ORDER — HEPARIN SODIUM 1000 [USP'U]/ML
25 INJECTION, SOLUTION INTRAVENOUS; SUBCUTANEOUS AS NEEDED
Status: DISCONTINUED | OUTPATIENT
Start: 2022-10-27 | End: 2022-10-27

## 2022-10-27 RX ORDER — HYDROCODONE BITARTRATE AND ACETAMINOPHEN 5; 325 MG/1; MG/1
1 TABLET ORAL EVERY 4 HOURS PRN
Status: DISCONTINUED | OUTPATIENT
Start: 2022-10-27 | End: 2022-11-01

## 2022-10-27 RX ORDER — HEPARIN SODIUM 1000 [USP'U]/ML
50 INJECTION, SOLUTION INTRAVENOUS; SUBCUTANEOUS AS NEEDED
Status: DISCONTINUED | OUTPATIENT
Start: 2022-10-27 | End: 2022-10-27

## 2022-10-27 RX ORDER — LEVOTHYROXINE SODIUM 0.1 MG/1
200 TABLET ORAL DAILY
Status: DISCONTINUED | OUTPATIENT
Start: 2022-10-28 | End: 2022-10-30

## 2022-10-27 RX ORDER — ACETAMINOPHEN 325 MG/1
650 TABLET ORAL EVERY 4 HOURS PRN
Status: DISCONTINUED | OUTPATIENT
Start: 2022-10-27 | End: 2022-11-01

## 2022-10-27 RX ORDER — SERTRALINE HYDROCHLORIDE 100 MG/1
100 TABLET, FILM COATED ORAL DAILY
Status: DISCONTINUED | OUTPATIENT
Start: 2022-10-28 | End: 2022-11-01

## 2022-10-27 RX ORDER — INSULIN LISPRO 100 [IU]/ML
0-7 INJECTION, SOLUTION INTRAVENOUS; SUBCUTANEOUS
Status: DISCONTINUED | OUTPATIENT
Start: 2022-10-28 | End: 2022-11-01

## 2022-10-27 RX ORDER — SODIUM CHLORIDE 0.9 % (FLUSH) 0.9 %
10 SYRINGE (ML) INJECTION EVERY 12 HOURS SCHEDULED
Status: DISCONTINUED | OUTPATIENT
Start: 2022-10-27 | End: 2022-11-06 | Stop reason: HOSPADM

## 2022-10-27 RX ADMIN — BUSPIRONE HYDROCHLORIDE 5 MG: 5 TABLET ORAL at 22:20

## 2022-10-27 RX ADMIN — Medication 10 ML: at 20:14

## 2022-10-27 RX ADMIN — INSULIN DETEMIR 25 UNITS: 100 INJECTION, SOLUTION SUBCUTANEOUS at 22:20

## 2022-10-27 RX ADMIN — HEPARIN SODIUM 17.2 UNITS/KG/HR: 10000 INJECTION, SOLUTION INTRAVENOUS at 19:51

## 2022-10-28 ENCOUNTER — APPOINTMENT (OUTPATIENT)
Dept: PULMONOLOGY | Facility: HOSPITAL | Age: 42
End: 2022-10-28

## 2022-10-28 ENCOUNTER — APPOINTMENT (OUTPATIENT)
Dept: CARDIOLOGY | Facility: HOSPITAL | Age: 42
End: 2022-10-28

## 2022-10-28 LAB
ALBUMIN SERPL-MCNC: 3.6 G/DL (ref 3.5–5.2)
ALBUMIN/GLOB SERPL: 1.3 G/DL
ALP SERPL-CCNC: 78 U/L (ref 39–117)
ALT SERPL W P-5'-P-CCNC: 27 U/L (ref 1–33)
ANION GAP SERPL CALCULATED.3IONS-SCNC: 10 MMOL/L (ref 5–15)
AST SERPL-CCNC: 20 U/L (ref 1–32)
BASOPHILS # BLD AUTO: 0.1 10*3/MM3 (ref 0–0.2)
BASOPHILS NFR BLD AUTO: 0.6 % (ref 0–1.5)
BH CV XLRA MEAS LEFT DIST CCA EDV: 29.8 CM/SEC
BH CV XLRA MEAS LEFT DIST CCA PSV: 96.9 CM/SEC
BH CV XLRA MEAS LEFT DIST ICA EDV: 30 CM/SEC
BH CV XLRA MEAS LEFT DIST ICA PSV: 78.6 CM/SEC
BH CV XLRA MEAS LEFT ICA/CCA RATIO: 0.86
BH CV XLRA MEAS LEFT MID CCA EDV: 35.3 CM/SEC
BH CV XLRA MEAS LEFT MID CCA PSV: 116 CM/SEC
BH CV XLRA MEAS LEFT MID ICA EDV: 32.3 CM/SEC
BH CV XLRA MEAS LEFT MID ICA PSV: 93.8 CM/SEC
BH CV XLRA MEAS LEFT PROX CCA EDV: 27.4 CM/SEC
BH CV XLRA MEAS LEFT PROX CCA PSV: 111 CM/SEC
BH CV XLRA MEAS LEFT PROX ECA EDV: 15.7 CM/SEC
BH CV XLRA MEAS LEFT PROX ECA PSV: 125 CM/SEC
BH CV XLRA MEAS LEFT PROX ICA EDV: 31.7 CM/SEC
BH CV XLRA MEAS LEFT PROX ICA PSV: 99.4 CM/SEC
BH CV XLRA MEAS LEFT PROX SCLA PSV: 147 CM/SEC
BH CV XLRA MEAS LEFT VERTEBRAL A EDV: 18.4 CM/SEC
BH CV XLRA MEAS LEFT VERTEBRAL A PSV: 50.9 CM/SEC
BH CV XLRA MEAS RIGHT DIST CCA EDV: 29.4 CM/SEC
BH CV XLRA MEAS RIGHT DIST CCA PSV: 104 CM/SEC
BH CV XLRA MEAS RIGHT DIST ICA EDV: 34.8 CM/SEC
BH CV XLRA MEAS RIGHT DIST ICA PSV: 87 CM/SEC
BH CV XLRA MEAS RIGHT ICA/CCA RATIO: 1.15
BH CV XLRA MEAS RIGHT MID CCA EDV: 28 CM/SEC
BH CV XLRA MEAS RIGHT MID CCA PSV: 102 CM/SEC
BH CV XLRA MEAS RIGHT MID ICA EDV: 35.4 CM/SEC
BH CV XLRA MEAS RIGHT MID ICA PSV: 87 CM/SEC
BH CV XLRA MEAS RIGHT PROX CCA EDV: 26.7 CM/SEC
BH CV XLRA MEAS RIGHT PROX CCA PSV: 121 CM/SEC
BH CV XLRA MEAS RIGHT PROX ECA EDV: 14.9 CM/SEC
BH CV XLRA MEAS RIGHT PROX ECA PSV: 115 CM/SEC
BH CV XLRA MEAS RIGHT PROX ICA EDV: 29 CM/SEC
BH CV XLRA MEAS RIGHT PROX ICA PSV: 117 CM/SEC
BH CV XLRA MEAS RIGHT PROX SCLA PSV: 105 CM/SEC
BH CV XLRA MEAS RIGHT VERTEBRAL A EDV: 13.5 CM/SEC
BH CV XLRA MEAS RIGHT VERTEBRAL A PSV: 43.9 CM/SEC
BILIRUB SERPL-MCNC: 0.3 MG/DL (ref 0–1.2)
BUN SERPL-MCNC: 12 MG/DL (ref 6–20)
BUN/CREAT SERPL: 15.6 (ref 7–25)
CALCIUM SPEC-SCNC: 8.8 MG/DL (ref 8.6–10.5)
CHLORIDE SERPL-SCNC: 98 MMOL/L (ref 98–107)
CHOLEST SERPL-MCNC: 133 MG/DL (ref 0–200)
CO2 SERPL-SCNC: 26 MMOL/L (ref 22–29)
CREAT SERPL-MCNC: 0.77 MG/DL (ref 0.57–1)
DEPRECATED RDW RBC AUTO: 40.3 FL (ref 37–54)
EGFRCR SERPLBLD CKD-EPI 2021: 98.9 ML/MIN/1.73
EOSINOPHIL # BLD AUTO: 0.29 10*3/MM3 (ref 0–0.4)
EOSINOPHIL NFR BLD AUTO: 1.8 % (ref 0.3–6.2)
ERYTHROCYTE [DISTWIDTH] IN BLOOD BY AUTOMATED COUNT: 13.2 % (ref 12.3–15.4)
GLOBULIN UR ELPH-MCNC: 2.7 GM/DL
GLUCOSE BLDC GLUCOMTR-MCNC: 181 MG/DL (ref 70–130)
GLUCOSE BLDC GLUCOMTR-MCNC: 202 MG/DL (ref 70–130)
GLUCOSE BLDC GLUCOMTR-MCNC: 239 MG/DL (ref 70–130)
GLUCOSE BLDC GLUCOMTR-MCNC: 275 MG/DL (ref 70–130)
GLUCOSE SERPL-MCNC: 340 MG/DL (ref 65–99)
HCT VFR BLD AUTO: 41.1 % (ref 34–46.6)
HDLC SERPL-MCNC: 46 MG/DL (ref 40–60)
HGB BLD-MCNC: 13.8 G/DL (ref 12–15.9)
IMM GRANULOCYTES # BLD AUTO: 0.19 10*3/MM3 (ref 0–0.05)
IMM GRANULOCYTES NFR BLD AUTO: 1.2 % (ref 0–0.5)
INR PPP: 1 (ref 0.84–1.13)
LDLC SERPL CALC-MCNC: 57 MG/DL (ref 0–100)
LDLC/HDLC SERPL: 1.09 {RATIO}
LYMPHOCYTES # BLD AUTO: 3.01 10*3/MM3 (ref 0.7–3.1)
LYMPHOCYTES NFR BLD AUTO: 19.1 % (ref 19.6–45.3)
MAGNESIUM SERPL-MCNC: 1.8 MG/DL (ref 1.6–2.6)
MAXIMAL PREDICTED HEART RATE: 178 BPM
MCH RBC QN AUTO: 28.3 PG (ref 26.6–33)
MCHC RBC AUTO-ENTMCNC: 33.6 G/DL (ref 31.5–35.7)
MCV RBC AUTO: 84.2 FL (ref 79–97)
MONOCYTES # BLD AUTO: 0.95 10*3/MM3 (ref 0.1–0.9)
MONOCYTES NFR BLD AUTO: 6 % (ref 5–12)
NEUTROPHILS NFR BLD AUTO: 11.25 10*3/MM3 (ref 1.7–7)
NEUTROPHILS NFR BLD AUTO: 71.3 % (ref 42.7–76)
NRBC BLD AUTO-RTO: 0 /100 WBC (ref 0–0.2)
PLATELET # BLD AUTO: 177 10*3/MM3 (ref 140–450)
PMV BLD AUTO: 12.7 FL (ref 6–12)
POTASSIUM SERPL-SCNC: 5.2 MMOL/L (ref 3.5–5.2)
PROT SERPL-MCNC: 6.3 G/DL (ref 6–8.5)
PROTHROMBIN TIME: 13 SECONDS (ref 11.4–14.4)
RBC # BLD AUTO: 4.88 10*6/MM3 (ref 3.77–5.28)
SODIUM SERPL-SCNC: 134 MMOL/L (ref 136–145)
STRESS TARGET HR: 151 BPM
TRIGL SERPL-MCNC: 184 MG/DL (ref 0–150)
UFH PPP CHRO-ACNC: 0.3 IU/ML (ref 0.3–0.7)
UFH PPP CHRO-ACNC: 0.42 IU/ML (ref 0.3–0.7)
UFH PPP CHRO-ACNC: 0.43 IU/ML (ref 0.3–0.7)
VLDLC SERPL-MCNC: 30 MG/DL (ref 5–40)
WBC NRBC COR # BLD: 15.79 10*3/MM3 (ref 3.4–10.8)

## 2022-10-28 PROCEDURE — 85520 HEPARIN ASSAY: CPT

## 2022-10-28 PROCEDURE — 93306 TTE W/DOPPLER COMPLETE: CPT | Performed by: INTERNAL MEDICINE

## 2022-10-28 PROCEDURE — 63710000001 INSULIN LISPRO (HUMAN) PER 5 UNITS: Performed by: INTERNAL MEDICINE

## 2022-10-28 PROCEDURE — 85025 COMPLETE CBC W/AUTO DIFF WBC: CPT | Performed by: INTERNAL MEDICINE

## 2022-10-28 PROCEDURE — 85610 PROTHROMBIN TIME: CPT | Performed by: INTERNAL MEDICINE

## 2022-10-28 PROCEDURE — 99232 SBSQ HOSP IP/OBS MODERATE 35: CPT | Performed by: PEDIATRICS

## 2022-10-28 PROCEDURE — 93306 TTE W/DOPPLER COMPLETE: CPT

## 2022-10-28 PROCEDURE — 99221 1ST HOSP IP/OBS SF/LOW 40: CPT | Performed by: INTERNAL MEDICINE

## 2022-10-28 PROCEDURE — 63710000001 INSULIN LISPRO (HUMAN) PER 5 UNITS: Performed by: PEDIATRICS

## 2022-10-28 PROCEDURE — G0108 DIAB MANAGE TRN  PER INDIV: HCPCS

## 2022-10-28 PROCEDURE — 93880 EXTRACRANIAL BILAT STUDY: CPT

## 2022-10-28 PROCEDURE — 25010000002 HEPARIN (PORCINE) 25000-0.45 UT/250ML-% SOLUTION: Performed by: INTERNAL MEDICINE

## 2022-10-28 PROCEDURE — 63710000001 INSULIN DETEMIR PER 5 UNITS: Performed by: INTERNAL MEDICINE

## 2022-10-28 PROCEDURE — 94010 BREATHING CAPACITY TEST: CPT | Performed by: INTERNAL MEDICINE

## 2022-10-28 PROCEDURE — 94010 BREATHING CAPACITY TEST: CPT

## 2022-10-28 PROCEDURE — 93880 EXTRACRANIAL BILAT STUDY: CPT | Performed by: INTERNAL MEDICINE

## 2022-10-28 PROCEDURE — 82962 GLUCOSE BLOOD TEST: CPT

## 2022-10-28 PROCEDURE — 83735 ASSAY OF MAGNESIUM: CPT | Performed by: INTERNAL MEDICINE

## 2022-10-28 PROCEDURE — 80061 LIPID PANEL: CPT | Performed by: INTERNAL MEDICINE

## 2022-10-28 PROCEDURE — 80053 COMPREHEN METABOLIC PANEL: CPT | Performed by: INTERNAL MEDICINE

## 2022-10-28 RX ORDER — POLYETHYLENE GLYCOL 3350 17 G/17G
17 POWDER, FOR SOLUTION ORAL DAILY PRN
Status: DISCONTINUED | OUTPATIENT
Start: 2022-10-28 | End: 2022-10-29

## 2022-10-28 RX ORDER — ATORVASTATIN CALCIUM 40 MG/1
40 TABLET, FILM COATED ORAL NIGHTLY
Status: DISCONTINUED | OUTPATIENT
Start: 2022-10-28 | End: 2022-11-01

## 2022-10-28 RX ORDER — AMOXICILLIN 250 MG
2 CAPSULE ORAL 2 TIMES DAILY
Status: DISCONTINUED | OUTPATIENT
Start: 2022-10-28 | End: 2022-10-29

## 2022-10-28 RX ORDER — INSULIN LISPRO 100 [IU]/ML
3 INJECTION, SOLUTION INTRAVENOUS; SUBCUTANEOUS
Status: DISCONTINUED | OUTPATIENT
Start: 2022-10-28 | End: 2022-10-30

## 2022-10-28 RX ORDER — ASPIRIN 81 MG/1
81 TABLET ORAL DAILY
Status: DISCONTINUED | OUTPATIENT
Start: 2022-10-28 | End: 2022-11-01

## 2022-10-28 RX ORDER — BISACODYL 10 MG
10 SUPPOSITORY, RECTAL RECTAL DAILY PRN
Status: DISCONTINUED | OUTPATIENT
Start: 2022-10-28 | End: 2022-10-29

## 2022-10-28 RX ORDER — BISACODYL 5 MG/1
5 TABLET, DELAYED RELEASE ORAL DAILY PRN
Status: DISCONTINUED | OUTPATIENT
Start: 2022-10-28 | End: 2022-10-29

## 2022-10-28 RX ADMIN — ASPIRIN 81 MG: 81 TABLET, COATED ORAL at 12:16

## 2022-10-28 RX ADMIN — HEPARIN SODIUM 17.2 UNITS/KG/HR: 10000 INJECTION, SOLUTION INTRAVENOUS at 10:23

## 2022-10-28 RX ADMIN — FAMOTIDINE 20 MG: 20 TABLET ORAL at 16:47

## 2022-10-28 RX ADMIN — SERTRALINE HYDROCHLORIDE 100 MG: 100 TABLET ORAL at 09:28

## 2022-10-28 RX ADMIN — Medication 10 ML: at 20:41

## 2022-10-28 RX ADMIN — INSULIN LISPRO 3 UNITS: 100 INJECTION, SOLUTION INTRAVENOUS; SUBCUTANEOUS at 09:28

## 2022-10-28 RX ADMIN — BUSPIRONE HYDROCHLORIDE 5 MG: 5 TABLET ORAL at 16:47

## 2022-10-28 RX ADMIN — SENNOSIDES AND DOCUSATE SODIUM 2 TABLET: 50; 8.6 TABLET ORAL at 20:41

## 2022-10-28 RX ADMIN — LEVOTHYROXINE SODIUM 200 MCG: 0.1 TABLET ORAL at 09:28

## 2022-10-28 RX ADMIN — FAMOTIDINE 20 MG: 20 TABLET ORAL at 09:28

## 2022-10-28 RX ADMIN — Medication 10 ML: at 09:29

## 2022-10-28 RX ADMIN — INSULIN LISPRO 3 UNITS: 100 INJECTION, SOLUTION INTRAVENOUS; SUBCUTANEOUS at 17:27

## 2022-10-28 RX ADMIN — INSULIN DETEMIR 25 UNITS: 100 INJECTION, SOLUTION SUBCUTANEOUS at 22:15

## 2022-10-28 RX ADMIN — BUSPIRONE HYDROCHLORIDE 5 MG: 5 TABLET ORAL at 09:28

## 2022-10-28 RX ADMIN — INSULIN LISPRO 2 UNITS: 100 INJECTION, SOLUTION INTRAVENOUS; SUBCUTANEOUS at 12:16

## 2022-10-28 RX ADMIN — BUSPIRONE HYDROCHLORIDE 5 MG: 5 TABLET ORAL at 20:41

## 2022-10-28 RX ADMIN — INSULIN LISPRO 3 UNITS: 100 INJECTION, SOLUTION INTRAVENOUS; SUBCUTANEOUS at 17:26

## 2022-10-28 RX ADMIN — ATORVASTATIN CALCIUM 40 MG: 40 TABLET, FILM COATED ORAL at 20:41

## 2022-10-29 PROBLEM — K59.00 CONSTIPATION: Status: ACTIVE | Noted: 2022-10-29

## 2022-10-29 LAB
ALBUMIN SERPL-MCNC: 3.9 G/DL (ref 3.5–5.2)
ALBUMIN/GLOB SERPL: 1.3 G/DL
ALP SERPL-CCNC: 84 U/L (ref 39–117)
ALT SERPL W P-5'-P-CCNC: 26 U/L (ref 1–33)
ANION GAP SERPL CALCULATED.3IONS-SCNC: 11 MMOL/L (ref 5–15)
AST SERPL-CCNC: 17 U/L (ref 1–32)
BASOPHILS # BLD AUTO: 0.11 10*3/MM3 (ref 0–0.2)
BASOPHILS NFR BLD AUTO: 0.8 % (ref 0–1.5)
BILIRUB SERPL-MCNC: 0.5 MG/DL (ref 0–1.2)
BUN SERPL-MCNC: 11 MG/DL (ref 6–20)
BUN/CREAT SERPL: 16.2 (ref 7–25)
CALCIUM SPEC-SCNC: 8.9 MG/DL (ref 8.6–10.5)
CHLORIDE SERPL-SCNC: 98 MMOL/L (ref 98–107)
CO2 SERPL-SCNC: 28 MMOL/L (ref 22–29)
CREAT SERPL-MCNC: 0.68 MG/DL (ref 0.57–1)
DEPRECATED RDW RBC AUTO: 41.1 FL (ref 37–54)
EGFRCR SERPLBLD CKD-EPI 2021: 111.7 ML/MIN/1.73
EOSINOPHIL # BLD AUTO: 0.29 10*3/MM3 (ref 0–0.4)
EOSINOPHIL NFR BLD AUTO: 2.1 % (ref 0.3–6.2)
ERYTHROCYTE [DISTWIDTH] IN BLOOD BY AUTOMATED COUNT: 13.3 % (ref 12.3–15.4)
GLOBULIN UR ELPH-MCNC: 2.9 GM/DL
GLUCOSE BLDC GLUCOMTR-MCNC: 202 MG/DL (ref 70–130)
GLUCOSE BLDC GLUCOMTR-MCNC: 230 MG/DL (ref 70–130)
GLUCOSE BLDC GLUCOMTR-MCNC: 233 MG/DL (ref 70–130)
GLUCOSE BLDC GLUCOMTR-MCNC: 237 MG/DL (ref 70–130)
GLUCOSE SERPL-MCNC: 229 MG/DL (ref 65–99)
HCT VFR BLD AUTO: 44.1 % (ref 34–46.6)
HGB BLD-MCNC: 14.6 G/DL (ref 12–15.9)
IMM GRANULOCYTES # BLD AUTO: 0.19 10*3/MM3 (ref 0–0.05)
IMM GRANULOCYTES NFR BLD AUTO: 1.4 % (ref 0–0.5)
LYMPHOCYTES # BLD AUTO: 2.89 10*3/MM3 (ref 0.7–3.1)
LYMPHOCYTES NFR BLD AUTO: 20.9 % (ref 19.6–45.3)
MAGNESIUM SERPL-MCNC: 2.4 MG/DL (ref 1.6–2.6)
MCH RBC QN AUTO: 28.2 PG (ref 26.6–33)
MCHC RBC AUTO-ENTMCNC: 33.1 G/DL (ref 31.5–35.7)
MCV RBC AUTO: 85.1 FL (ref 79–97)
MONOCYTES # BLD AUTO: 0.78 10*3/MM3 (ref 0.1–0.9)
MONOCYTES NFR BLD AUTO: 5.6 % (ref 5–12)
NEUTROPHILS NFR BLD AUTO: 69.2 % (ref 42.7–76)
NEUTROPHILS NFR BLD AUTO: 9.58 10*3/MM3 (ref 1.7–7)
NRBC BLD AUTO-RTO: 0 /100 WBC (ref 0–0.2)
PLATELET # BLD AUTO: 159 10*3/MM3 (ref 140–450)
PMV BLD AUTO: 12.9 FL (ref 6–12)
POTASSIUM SERPL-SCNC: 5.1 MMOL/L (ref 3.5–5.2)
PROT SERPL-MCNC: 6.8 G/DL (ref 6–8.5)
RBC # BLD AUTO: 5.18 10*6/MM3 (ref 3.77–5.28)
SODIUM SERPL-SCNC: 137 MMOL/L (ref 136–145)
T4 FREE SERPL-MCNC: 1.82 NG/DL (ref 0.93–1.7)
TSH SERPL DL<=0.05 MIU/L-ACNC: 4.98 UIU/ML (ref 0.27–4.2)
UFH PPP CHRO-ACNC: 0.31 IU/ML (ref 0.3–0.7)
WBC NRBC COR # BLD: 13.84 10*3/MM3 (ref 3.4–10.8)

## 2022-10-29 PROCEDURE — 63710000001 INSULIN DETEMIR PER 5 UNITS: Performed by: PHYSICIAN ASSISTANT

## 2022-10-29 PROCEDURE — 63710000001 INSULIN LISPRO (HUMAN) PER 5 UNITS: Performed by: PEDIATRICS

## 2022-10-29 PROCEDURE — 99233 SBSQ HOSP IP/OBS HIGH 50: CPT | Performed by: PHYSICIAN ASSISTANT

## 2022-10-29 PROCEDURE — 99231 SBSQ HOSP IP/OBS SF/LOW 25: CPT | Performed by: THORACIC SURGERY (CARDIOTHORACIC VASCULAR SURGERY)

## 2022-10-29 PROCEDURE — 82962 GLUCOSE BLOOD TEST: CPT

## 2022-10-29 PROCEDURE — 84443 ASSAY THYROID STIM HORMONE: CPT | Performed by: PEDIATRICS

## 2022-10-29 PROCEDURE — 25010000002 HEPARIN (PORCINE) 25000-0.45 UT/250ML-% SOLUTION: Performed by: INTERNAL MEDICINE

## 2022-10-29 PROCEDURE — 63710000001 INSULIN LISPRO (HUMAN) PER 5 UNITS: Performed by: INTERNAL MEDICINE

## 2022-10-29 PROCEDURE — 85025 COMPLETE CBC W/AUTO DIFF WBC: CPT | Performed by: INTERNAL MEDICINE

## 2022-10-29 PROCEDURE — 83735 ASSAY OF MAGNESIUM: CPT | Performed by: INTERNAL MEDICINE

## 2022-10-29 PROCEDURE — 85520 HEPARIN ASSAY: CPT

## 2022-10-29 PROCEDURE — 84439 ASSAY OF FREE THYROXINE: CPT | Performed by: PHYSICIAN ASSISTANT

## 2022-10-29 PROCEDURE — 80053 COMPREHEN METABOLIC PANEL: CPT | Performed by: INTERNAL MEDICINE

## 2022-10-29 RX ORDER — POLYETHYLENE GLYCOL 3350 17 G/17G
17 POWDER, FOR SOLUTION ORAL 2 TIMES DAILY
Status: DISCONTINUED | OUTPATIENT
Start: 2022-10-29 | End: 2022-11-02

## 2022-10-29 RX ORDER — BISACODYL 5 MG/1
5 TABLET, DELAYED RELEASE ORAL 2 TIMES DAILY PRN
Status: DISCONTINUED | OUTPATIENT
Start: 2022-10-29 | End: 2022-11-01

## 2022-10-29 RX ORDER — BISACODYL 5 MG/1
5 TABLET, DELAYED RELEASE ORAL ONCE
Status: COMPLETED | OUTPATIENT
Start: 2022-10-29 | End: 2022-10-29

## 2022-10-29 RX ORDER — BISACODYL 5 MG/1
10 TABLET, DELAYED RELEASE ORAL DAILY PRN
Status: DISCONTINUED | OUTPATIENT
Start: 2022-10-29 | End: 2022-10-29

## 2022-10-29 RX ADMIN — LEVOTHYROXINE SODIUM 200 MCG: 0.1 TABLET ORAL at 08:33

## 2022-10-29 RX ADMIN — INSULIN LISPRO 3 UNITS: 100 INJECTION, SOLUTION INTRAVENOUS; SUBCUTANEOUS at 12:23

## 2022-10-29 RX ADMIN — Medication 10 ML: at 08:34

## 2022-10-29 RX ADMIN — ASPIRIN 81 MG: 81 TABLET, COATED ORAL at 08:33

## 2022-10-29 RX ADMIN — BUSPIRONE HYDROCHLORIDE 5 MG: 5 TABLET ORAL at 16:35

## 2022-10-29 RX ADMIN — POLYETHYLENE GLYCOL 3350 17 G: 17 POWDER, FOR SOLUTION ORAL at 20:16

## 2022-10-29 RX ADMIN — INSULIN DETEMIR 30 UNITS: 100 INJECTION, SOLUTION SUBCUTANEOUS at 20:18

## 2022-10-29 RX ADMIN — INSULIN LISPRO 3 UNITS: 100 INJECTION, SOLUTION INTRAVENOUS; SUBCUTANEOUS at 08:33

## 2022-10-29 RX ADMIN — FAMOTIDINE 20 MG: 20 TABLET ORAL at 08:33

## 2022-10-29 RX ADMIN — BUSPIRONE HYDROCHLORIDE 5 MG: 5 TABLET ORAL at 08:33

## 2022-10-29 RX ADMIN — INSULIN LISPRO 3 UNITS: 100 INJECTION, SOLUTION INTRAVENOUS; SUBCUTANEOUS at 12:24

## 2022-10-29 RX ADMIN — HEPARIN SODIUM 17.2 UNITS/KG/HR: 10000 INJECTION, SOLUTION INTRAVENOUS at 01:23

## 2022-10-29 RX ADMIN — INSULIN LISPRO 3 UNITS: 100 INJECTION, SOLUTION INTRAVENOUS; SUBCUTANEOUS at 17:26

## 2022-10-29 RX ADMIN — SERTRALINE HYDROCHLORIDE 100 MG: 100 TABLET ORAL at 08:33

## 2022-10-29 RX ADMIN — ATORVASTATIN CALCIUM 40 MG: 40 TABLET, FILM COATED ORAL at 20:16

## 2022-10-29 RX ADMIN — Medication 10 ML: at 20:16

## 2022-10-29 RX ADMIN — BISACODYL 5 MG: 5 TABLET, COATED ORAL at 16:35

## 2022-10-29 RX ADMIN — FAMOTIDINE 20 MG: 20 TABLET ORAL at 16:35

## 2022-10-29 RX ADMIN — BUSPIRONE HYDROCHLORIDE 5 MG: 5 TABLET ORAL at 20:16

## 2022-10-29 RX ADMIN — SENNOSIDES AND DOCUSATE SODIUM 2 TABLET: 50; 8.6 TABLET ORAL at 08:33

## 2022-10-29 RX ADMIN — HEPARIN SODIUM 17.2 UNITS/KG/HR: 10000 INJECTION, SOLUTION INTRAVENOUS at 16:31

## 2022-10-30 LAB
ALBUMIN SERPL-MCNC: 4 G/DL (ref 3.5–5.2)
ALBUMIN/GLOB SERPL: 1.3 G/DL
ALP SERPL-CCNC: 90 U/L (ref 39–117)
ALT SERPL W P-5'-P-CCNC: 24 U/L (ref 1–33)
ANION GAP SERPL CALCULATED.3IONS-SCNC: 11 MMOL/L (ref 5–15)
AST SERPL-CCNC: 15 U/L (ref 1–32)
BASOPHILS # BLD AUTO: 0.1 10*3/MM3 (ref 0–0.2)
BASOPHILS NFR BLD AUTO: 0.8 % (ref 0–1.5)
BILIRUB SERPL-MCNC: 0.4 MG/DL (ref 0–1.2)
BUN SERPL-MCNC: 13 MG/DL (ref 6–20)
BUN/CREAT SERPL: 19.1 (ref 7–25)
CALCIUM SPEC-SCNC: 8.8 MG/DL (ref 8.6–10.5)
CHLORIDE SERPL-SCNC: 99 MMOL/L (ref 98–107)
CO2 SERPL-SCNC: 25 MMOL/L (ref 22–29)
CREAT SERPL-MCNC: 0.68 MG/DL (ref 0.57–1)
DEPRECATED RDW RBC AUTO: 40.9 FL (ref 37–54)
EGFRCR SERPLBLD CKD-EPI 2021: 111.7 ML/MIN/1.73
EOSINOPHIL # BLD AUTO: 0.27 10*3/MM3 (ref 0–0.4)
EOSINOPHIL NFR BLD AUTO: 2 % (ref 0.3–6.2)
ERYTHROCYTE [DISTWIDTH] IN BLOOD BY AUTOMATED COUNT: 13.2 % (ref 12.3–15.4)
GLOBULIN UR ELPH-MCNC: 3.2 GM/DL
GLUCOSE BLDC GLUCOMTR-MCNC: 243 MG/DL (ref 70–130)
GLUCOSE BLDC GLUCOMTR-MCNC: 248 MG/DL (ref 70–130)
GLUCOSE BLDC GLUCOMTR-MCNC: 259 MG/DL (ref 70–130)
GLUCOSE BLDC GLUCOMTR-MCNC: 275 MG/DL (ref 70–130)
GLUCOSE SERPL-MCNC: 254 MG/DL (ref 65–99)
HCT VFR BLD AUTO: 45.9 % (ref 34–46.6)
HGB BLD-MCNC: 15.1 G/DL (ref 12–15.9)
IMM GRANULOCYTES # BLD AUTO: 0.18 10*3/MM3 (ref 0–0.05)
IMM GRANULOCYTES NFR BLD AUTO: 1.4 % (ref 0–0.5)
LYMPHOCYTES # BLD AUTO: 2.52 10*3/MM3 (ref 0.7–3.1)
LYMPHOCYTES NFR BLD AUTO: 19 % (ref 19.6–45.3)
MAGNESIUM SERPL-MCNC: 1.8 MG/DL (ref 1.6–2.6)
MCH RBC QN AUTO: 28.3 PG (ref 26.6–33)
MCHC RBC AUTO-ENTMCNC: 32.9 G/DL (ref 31.5–35.7)
MCV RBC AUTO: 86.1 FL (ref 79–97)
MONOCYTES # BLD AUTO: 0.63 10*3/MM3 (ref 0.1–0.9)
MONOCYTES NFR BLD AUTO: 4.8 % (ref 5–12)
NEUTROPHILS NFR BLD AUTO: 72 % (ref 42.7–76)
NEUTROPHILS NFR BLD AUTO: 9.54 10*3/MM3 (ref 1.7–7)
NRBC BLD AUTO-RTO: 0 /100 WBC (ref 0–0.2)
PLATELET # BLD AUTO: 145 10*3/MM3 (ref 140–450)
PMV BLD AUTO: 13 FL (ref 6–12)
POTASSIUM SERPL-SCNC: 4.6 MMOL/L (ref 3.5–5.2)
PROCALCITONIN SERPL-MCNC: 0.07 NG/ML (ref 0–0.25)
PROT SERPL-MCNC: 7.2 G/DL (ref 6–8.5)
QT INTERVAL: 384 MS
QTC INTERVAL: 437 MS
RBC # BLD AUTO: 5.33 10*6/MM3 (ref 3.77–5.28)
SODIUM SERPL-SCNC: 135 MMOL/L (ref 136–145)
UFH PPP CHRO-ACNC: 0.26 IU/ML (ref 0.3–0.7)
UFH PPP CHRO-ACNC: 0.29 IU/ML (ref 0.3–0.7)
UFH PPP CHRO-ACNC: 0.32 IU/ML (ref 0.3–0.7)
WBC NRBC COR # BLD: 13.24 10*3/MM3 (ref 3.4–10.8)

## 2022-10-30 PROCEDURE — 83735 ASSAY OF MAGNESIUM: CPT | Performed by: INTERNAL MEDICINE

## 2022-10-30 PROCEDURE — 85520 HEPARIN ASSAY: CPT

## 2022-10-30 PROCEDURE — 85025 COMPLETE CBC W/AUTO DIFF WBC: CPT | Performed by: INTERNAL MEDICINE

## 2022-10-30 PROCEDURE — 99232 SBSQ HOSP IP/OBS MODERATE 35: CPT | Performed by: NURSE PRACTITIONER

## 2022-10-30 PROCEDURE — 63710000001 INSULIN LISPRO (HUMAN) PER 5 UNITS: Performed by: INTERNAL MEDICINE

## 2022-10-30 PROCEDURE — 86901 BLOOD TYPING SEROLOGIC RH(D): CPT

## 2022-10-30 PROCEDURE — 25010000002 HEPARIN (PORCINE) 25000-0.45 UT/250ML-% SOLUTION: Performed by: INTERNAL MEDICINE

## 2022-10-30 PROCEDURE — 80053 COMPREHEN METABOLIC PANEL: CPT | Performed by: INTERNAL MEDICINE

## 2022-10-30 PROCEDURE — 82962 GLUCOSE BLOOD TEST: CPT

## 2022-10-30 PROCEDURE — 63710000001 INSULIN LISPRO (HUMAN) PER 5 UNITS: Performed by: PEDIATRICS

## 2022-10-30 PROCEDURE — 63710000001 INSULIN DETEMIR PER 5 UNITS: Performed by: PHYSICIAN ASSISTANT

## 2022-10-30 PROCEDURE — 86900 BLOOD TYPING SEROLOGIC ABO: CPT

## 2022-10-30 PROCEDURE — 99231 SBSQ HOSP IP/OBS SF/LOW 25: CPT | Performed by: PHYSICIAN ASSISTANT

## 2022-10-30 PROCEDURE — 25010000002 HEPARIN (PORCINE) 25000-0.45 UT/250ML-% SOLUTION

## 2022-10-30 PROCEDURE — 84145 PROCALCITONIN (PCT): CPT | Performed by: NURSE PRACTITIONER

## 2022-10-30 RX ORDER — INSULIN LISPRO 100 [IU]/ML
5 INJECTION, SOLUTION INTRAVENOUS; SUBCUTANEOUS
Status: DISCONTINUED | OUTPATIENT
Start: 2022-10-31 | End: 2022-11-01

## 2022-10-30 RX ORDER — INSULIN GLARGINE 100 [IU]/ML
30 INJECTION, SOLUTION SUBCUTANEOUS DAILY
COMMUNITY

## 2022-10-30 RX ADMIN — HEPARIN SODIUM 18 UNITS/KG/HR: 10000 INJECTION, SOLUTION INTRAVENOUS at 17:14

## 2022-10-30 RX ADMIN — BUSPIRONE HYDROCHLORIDE 5 MG: 5 TABLET ORAL at 08:57

## 2022-10-30 RX ADMIN — INSULIN DETEMIR 30 UNITS: 100 INJECTION, SOLUTION SUBCUTANEOUS at 21:49

## 2022-10-30 RX ADMIN — INSULIN LISPRO 3 UNITS: 100 INJECTION, SOLUTION INTRAVENOUS; SUBCUTANEOUS at 16:27

## 2022-10-30 RX ADMIN — FAMOTIDINE 20 MG: 20 TABLET ORAL at 08:57

## 2022-10-30 RX ADMIN — INSULIN LISPRO 3 UNITS: 100 INJECTION, SOLUTION INTRAVENOUS; SUBCUTANEOUS at 11:53

## 2022-10-30 RX ADMIN — INSULIN LISPRO 4 UNITS: 100 INJECTION, SOLUTION INTRAVENOUS; SUBCUTANEOUS at 08:57

## 2022-10-30 RX ADMIN — HEPARIN SODIUM 17.2 UNITS/KG/HR: 10000 INJECTION, SOLUTION INTRAVENOUS at 03:57

## 2022-10-30 RX ADMIN — LEVOTHYROXINE SODIUM 200 MCG: 0.1 TABLET ORAL at 08:57

## 2022-10-30 RX ADMIN — INSULIN LISPRO 3 UNITS: 100 INJECTION, SOLUTION INTRAVENOUS; SUBCUTANEOUS at 16:25

## 2022-10-30 RX ADMIN — BUSPIRONE HYDROCHLORIDE 5 MG: 5 TABLET ORAL at 16:26

## 2022-10-30 RX ADMIN — ATORVASTATIN CALCIUM 40 MG: 40 TABLET, FILM COATED ORAL at 21:46

## 2022-10-30 RX ADMIN — ASPIRIN 81 MG: 81 TABLET, COATED ORAL at 08:58

## 2022-10-30 RX ADMIN — POLYETHYLENE GLYCOL 3350 17 G: 17 POWDER, FOR SOLUTION ORAL at 08:57

## 2022-10-30 RX ADMIN — FAMOTIDINE 20 MG: 20 TABLET ORAL at 16:26

## 2022-10-30 RX ADMIN — SERTRALINE HYDROCHLORIDE 100 MG: 100 TABLET ORAL at 08:57

## 2022-10-30 RX ADMIN — INSULIN LISPRO 3 UNITS: 100 INJECTION, SOLUTION INTRAVENOUS; SUBCUTANEOUS at 08:57

## 2022-10-30 RX ADMIN — BUSPIRONE HYDROCHLORIDE 5 MG: 5 TABLET ORAL at 21:46

## 2022-10-30 RX ADMIN — POLYETHYLENE GLYCOL 3350 17 G: 17 POWDER, FOR SOLUTION ORAL at 21:46

## 2022-10-31 ENCOUNTER — ANESTHESIA EVENT (OUTPATIENT)
Dept: PERIOP | Facility: HOSPITAL | Age: 42
End: 2022-10-31

## 2022-10-31 ENCOUNTER — APPOINTMENT (OUTPATIENT)
Dept: GENERAL RADIOLOGY | Facility: HOSPITAL | Age: 42
End: 2022-10-31

## 2022-10-31 LAB
ABO GROUP BLD: NORMAL
ABO GROUP BLD: NORMAL
ALBUMIN SERPL-MCNC: 4 G/DL (ref 3.5–5.2)
ALBUMIN/GLOB SERPL: 1.3 G/DL
ALP SERPL-CCNC: 92 U/L (ref 39–117)
ALT SERPL W P-5'-P-CCNC: 23 U/L (ref 1–33)
AMPHET+METHAMPHET UR QL: NEGATIVE
AMPHETAMINES UR QL: NEGATIVE
ANION GAP SERPL CALCULATED.3IONS-SCNC: 11 MMOL/L (ref 5–15)
AST SERPL-CCNC: 17 U/L (ref 1–32)
BACTERIA UR QL AUTO: ABNORMAL /HPF
BARBITURATES UR QL SCN: NEGATIVE
BASOPHILS # BLD AUTO: 0.1 10*3/MM3 (ref 0–0.2)
BASOPHILS NFR BLD AUTO: 0.7 % (ref 0–1.5)
BENZODIAZ UR QL SCN: NEGATIVE
BILIRUB SERPL-MCNC: 0.4 MG/DL (ref 0–1.2)
BILIRUB UR QL STRIP: NEGATIVE
BLD GP AB SCN SERPL QL: NEGATIVE
BUN SERPL-MCNC: 11 MG/DL (ref 6–20)
BUN/CREAT SERPL: 15.5 (ref 7–25)
BUPRENORPHINE SERPL-MCNC: NEGATIVE NG/ML
CALCIUM SPEC-SCNC: 8.7 MG/DL (ref 8.6–10.5)
CANNABINOIDS SERPL QL: POSITIVE
CHLORIDE SERPL-SCNC: 97 MMOL/L (ref 98–107)
CLARITY UR: CLEAR
CO2 SERPL-SCNC: 26 MMOL/L (ref 22–29)
COCAINE UR QL: NEGATIVE
COLOR UR: YELLOW
CREAT SERPL-MCNC: 0.71 MG/DL (ref 0.57–1)
DEPRECATED RDW RBC AUTO: 40.7 FL (ref 37–54)
EGFRCR SERPLBLD CKD-EPI 2021: 109 ML/MIN/1.73
EOSINOPHIL # BLD AUTO: 0.29 10*3/MM3 (ref 0–0.4)
EOSINOPHIL NFR BLD AUTO: 2.2 % (ref 0.3–6.2)
ERYTHROCYTE [DISTWIDTH] IN BLOOD BY AUTOMATED COUNT: 13.2 % (ref 12.3–15.4)
GLOBULIN UR ELPH-MCNC: 3 GM/DL
GLUCOSE BLDC GLUCOMTR-MCNC: 209 MG/DL (ref 70–130)
GLUCOSE BLDC GLUCOMTR-MCNC: 223 MG/DL (ref 70–130)
GLUCOSE BLDC GLUCOMTR-MCNC: 265 MG/DL (ref 70–130)
GLUCOSE SERPL-MCNC: 241 MG/DL (ref 65–99)
GLUCOSE UR STRIP-MCNC: NEGATIVE MG/DL
HCT VFR BLD AUTO: 42.6 % (ref 34–46.6)
HGB BLD-MCNC: 13.9 G/DL (ref 12–15.9)
HGB UR QL STRIP.AUTO: ABNORMAL
HYALINE CASTS UR QL AUTO: ABNORMAL /LPF
IMM GRANULOCYTES # BLD AUTO: 0.19 10*3/MM3 (ref 0–0.05)
IMM GRANULOCYTES NFR BLD AUTO: 1.4 % (ref 0–0.5)
KETONES UR QL STRIP: NEGATIVE
LEUKOCYTE ESTERASE UR QL STRIP.AUTO: NEGATIVE
LYMPHOCYTES # BLD AUTO: 2.93 10*3/MM3 (ref 0.7–3.1)
LYMPHOCYTES NFR BLD AUTO: 21.7 % (ref 19.6–45.3)
MAGNESIUM SERPL-MCNC: 2 MG/DL (ref 1.6–2.6)
MCH RBC QN AUTO: 28.1 PG (ref 26.6–33)
MCHC RBC AUTO-ENTMCNC: 32.6 G/DL (ref 31.5–35.7)
MCV RBC AUTO: 86.1 FL (ref 79–97)
METHADONE UR QL SCN: NEGATIVE
MONOCYTES # BLD AUTO: 0.73 10*3/MM3 (ref 0.1–0.9)
MONOCYTES NFR BLD AUTO: 5.4 % (ref 5–12)
NEUTROPHILS NFR BLD AUTO: 68.6 % (ref 42.7–76)
NEUTROPHILS NFR BLD AUTO: 9.24 10*3/MM3 (ref 1.7–7)
NITRITE UR QL STRIP: NEGATIVE
NRBC BLD AUTO-RTO: 0 /100 WBC (ref 0–0.2)
OPIATES UR QL: NEGATIVE
OXYCODONE UR QL SCN: NEGATIVE
PA ADP PRP-ACNC: 169 PRU
PCP UR QL SCN: NEGATIVE
PH UR STRIP.AUTO: 5.5 [PH] (ref 5–8)
PLATELET # BLD AUTO: 136 10*3/MM3 (ref 140–450)
PMV BLD AUTO: 13.3 FL (ref 6–12)
POTASSIUM SERPL-SCNC: 4.5 MMOL/L (ref 3.5–5.2)
PROPOXYPH UR QL: NEGATIVE
PROT SERPL-MCNC: 7 G/DL (ref 6–8.5)
PROT UR QL STRIP: NEGATIVE
RBC # BLD AUTO: 4.95 10*6/MM3 (ref 3.77–5.28)
RBC # UR STRIP: ABNORMAL /HPF
REF LAB TEST METHOD: ABNORMAL
RH BLD: POSITIVE
RH BLD: POSITIVE
SODIUM SERPL-SCNC: 134 MMOL/L (ref 136–145)
SP GR UR STRIP: 1.02 (ref 1–1.03)
SQUAMOUS #/AREA URNS HPF: ABNORMAL /HPF
T&S EXPIRATION DATE: NORMAL
TRICYCLICS UR QL SCN: NEGATIVE
UFH PPP CHRO-ACNC: 0.33 IU/ML (ref 0.3–0.7)
UFH PPP CHRO-ACNC: 0.34 IU/ML (ref 0.3–0.7)
UROBILINOGEN UR QL STRIP: ABNORMAL
WBC # UR STRIP: ABNORMAL /HPF
WBC NRBC COR # BLD: 13.48 10*3/MM3 (ref 3.4–10.8)

## 2022-10-31 PROCEDURE — 25010000002 HEPARIN (PORCINE) 25000-0.45 UT/250ML-% SOLUTION: Performed by: PHYSICIAN ASSISTANT

## 2022-10-31 PROCEDURE — 85520 HEPARIN ASSAY: CPT

## 2022-10-31 PROCEDURE — 81025 URINE PREGNANCY TEST: CPT | Performed by: NURSE PRACTITIONER

## 2022-10-31 PROCEDURE — 85576 BLOOD PLATELET AGGREGATION: CPT

## 2022-10-31 PROCEDURE — 86923 COMPATIBILITY TEST ELECTRIC: CPT

## 2022-10-31 PROCEDURE — 86901 BLOOD TYPING SEROLOGIC RH(D): CPT

## 2022-10-31 PROCEDURE — 99232 SBSQ HOSP IP/OBS MODERATE 35: CPT | Performed by: NURSE PRACTITIONER

## 2022-10-31 PROCEDURE — 71045 X-RAY EXAM CHEST 1 VIEW: CPT

## 2022-10-31 PROCEDURE — 85025 COMPLETE CBC W/AUTO DIFF WBC: CPT | Performed by: INTERNAL MEDICINE

## 2022-10-31 PROCEDURE — 80306 DRUG TEST PRSMV INSTRMNT: CPT

## 2022-10-31 PROCEDURE — 63710000001 INSULIN LISPRO (HUMAN) PER 5 UNITS: Performed by: NURSE PRACTITIONER

## 2022-10-31 PROCEDURE — 80053 COMPREHEN METABOLIC PANEL: CPT | Performed by: INTERNAL MEDICINE

## 2022-10-31 PROCEDURE — 25010000002 HEPARIN (PORCINE) 25000-0.45 UT/250ML-% SOLUTION

## 2022-10-31 PROCEDURE — 86850 RBC ANTIBODY SCREEN: CPT

## 2022-10-31 PROCEDURE — 86900 BLOOD TYPING SEROLOGIC ABO: CPT

## 2022-10-31 PROCEDURE — 81001 URINALYSIS AUTO W/SCOPE: CPT

## 2022-10-31 PROCEDURE — 82962 GLUCOSE BLOOD TEST: CPT

## 2022-10-31 PROCEDURE — 63710000001 INSULIN DETEMIR PER 5 UNITS: Performed by: NURSE PRACTITIONER

## 2022-10-31 PROCEDURE — 83735 ASSAY OF MAGNESIUM: CPT | Performed by: INTERNAL MEDICINE

## 2022-10-31 PROCEDURE — 63710000001 INSULIN LISPRO (HUMAN) PER 5 UNITS: Performed by: INTERNAL MEDICINE

## 2022-10-31 PROCEDURE — 99024 POSTOP FOLLOW-UP VISIT: CPT | Performed by: THORACIC SURGERY (CARDIOTHORACIC VASCULAR SURGERY)

## 2022-10-31 RX ORDER — SCOLOPAMINE TRANSDERMAL SYSTEM 1 MG/1
1 PATCH, EXTENDED RELEASE TRANSDERMAL CONTINUOUS
Status: DISCONTINUED | OUTPATIENT
Start: 2022-10-31 | End: 2022-10-31

## 2022-10-31 RX ORDER — BUPIVACAINE HCL/0.9 % NACL/PF 0.1 %
2 PLASTIC BAG, INJECTION (ML) EPIDURAL ONCE
Status: COMPLETED | OUTPATIENT
Start: 2022-11-01 | End: 2022-11-01

## 2022-10-31 RX ORDER — ACETAMINOPHEN 500 MG
1000 TABLET ORAL ONCE
Status: COMPLETED | OUTPATIENT
Start: 2022-11-01 | End: 2022-11-01

## 2022-10-31 RX ORDER — GABAPENTIN 300 MG/1
300 CAPSULE ORAL ONCE
Status: COMPLETED | OUTPATIENT
Start: 2022-11-01 | End: 2022-11-01

## 2022-10-31 RX ORDER — CHLORHEXIDINE GLUCONATE 0.12 MG/ML
15 RINSE ORAL EVERY 12 HOURS
Status: COMPLETED | OUTPATIENT
Start: 2022-10-31 | End: 2022-11-01

## 2022-10-31 RX ORDER — GABAPENTIN 300 MG/1
300 CAPSULE ORAL ONCE
Status: DISCONTINUED | OUTPATIENT
Start: 2022-10-31 | End: 2022-10-31

## 2022-10-31 RX ORDER — CHLORHEXIDINE GLUCONATE 500 MG/1
1 CLOTH TOPICAL EVERY 12 HOURS PRN
Status: DISCONTINUED | OUTPATIENT
Start: 2022-10-31 | End: 2022-11-01

## 2022-10-31 RX ORDER — ACETAMINOPHEN 500 MG
1000 TABLET ORAL ONCE
Status: DISCONTINUED | OUTPATIENT
Start: 2022-10-31 | End: 2022-10-31

## 2022-10-31 RX ORDER — SCOLOPAMINE TRANSDERMAL SYSTEM 1 MG/1
1 PATCH, EXTENDED RELEASE TRANSDERMAL CONTINUOUS
Status: ACTIVE | OUTPATIENT
Start: 2022-11-01 | End: 2022-11-04

## 2022-10-31 RX ORDER — IPRATROPIUM BROMIDE AND ALBUTEROL SULFATE 2.5; .5 MG/3ML; MG/3ML
3 SOLUTION RESPIRATORY (INHALATION) EVERY 4 HOURS PRN
Status: DISCONTINUED | OUTPATIENT
Start: 2022-10-31 | End: 2022-11-01

## 2022-10-31 RX ORDER — CEFAZOLIN SODIUM 2 G/100ML
2 INJECTION, SOLUTION INTRAVENOUS ONCE
Status: DISCONTINUED | OUTPATIENT
Start: 2022-10-31 | End: 2022-10-31

## 2022-10-31 RX ADMIN — INSULIN LISPRO 3 UNITS: 100 INJECTION, SOLUTION INTRAVENOUS; SUBCUTANEOUS at 09:15

## 2022-10-31 RX ADMIN — INSULIN DETEMIR 20 UNITS: 100 INJECTION, SOLUTION SUBCUTANEOUS at 21:38

## 2022-10-31 RX ADMIN — INSULIN LISPRO 5 UNITS: 100 INJECTION, SOLUTION INTRAVENOUS; SUBCUTANEOUS at 13:06

## 2022-10-31 RX ADMIN — FAMOTIDINE 20 MG: 20 TABLET ORAL at 09:17

## 2022-10-31 RX ADMIN — Medication 10 ML: at 21:36

## 2022-10-31 RX ADMIN — Medication 12.5 MG: at 21:33

## 2022-10-31 RX ADMIN — Medication 10 ML: at 09:18

## 2022-10-31 RX ADMIN — POLYETHYLENE GLYCOL 3350 17 G: 17 POWDER, FOR SOLUTION ORAL at 09:18

## 2022-10-31 RX ADMIN — HEPARIN SODIUM 19 UNITS/KG/HR: 10000 INJECTION, SOLUTION INTRAVENOUS at 18:35

## 2022-10-31 RX ADMIN — BUSPIRONE HYDROCHLORIDE 5 MG: 5 TABLET ORAL at 21:37

## 2022-10-31 RX ADMIN — SERTRALINE HYDROCHLORIDE 100 MG: 100 TABLET ORAL at 09:17

## 2022-10-31 RX ADMIN — POLYETHYLENE GLYCOL 3350 17 G: 17 POWDER, FOR SOLUTION ORAL at 21:33

## 2022-10-31 RX ADMIN — INSULIN LISPRO 5 UNITS: 100 INJECTION, SOLUTION INTRAVENOUS; SUBCUTANEOUS at 18:18

## 2022-10-31 RX ADMIN — MUPIROCIN 1 APPLICATION: 20 OINTMENT TOPICAL at 18:17

## 2022-10-31 RX ADMIN — BUSPIRONE HYDROCHLORIDE 5 MG: 5 TABLET ORAL at 18:17

## 2022-10-31 RX ADMIN — INSULIN LISPRO 5 UNITS: 100 INJECTION, SOLUTION INTRAVENOUS; SUBCUTANEOUS at 09:17

## 2022-10-31 RX ADMIN — HEPARIN SODIUM 19 UNITS/KG/HR: 10000 INJECTION, SOLUTION INTRAVENOUS at 06:15

## 2022-10-31 RX ADMIN — BUSPIRONE HYDROCHLORIDE 5 MG: 5 TABLET ORAL at 09:17

## 2022-10-31 RX ADMIN — ATORVASTATIN CALCIUM 40 MG: 40 TABLET, FILM COATED ORAL at 21:33

## 2022-10-31 RX ADMIN — CHLORHEXIDINE GLUCONATE 15 ML: 1.2 SOLUTION ORAL at 18:17

## 2022-10-31 RX ADMIN — ASPIRIN 81 MG: 81 TABLET, COATED ORAL at 09:18

## 2022-10-31 RX ADMIN — INSULIN LISPRO 3 UNITS: 100 INJECTION, SOLUTION INTRAVENOUS; SUBCUTANEOUS at 18:17

## 2022-10-31 RX ADMIN — LEVOTHYROXINE SODIUM 175 MCG: 150 TABLET ORAL at 09:17

## 2022-10-31 RX ADMIN — Medication 12.5 MG: at 09:17

## 2022-10-31 RX ADMIN — INSULIN LISPRO 4 UNITS: 100 INJECTION, SOLUTION INTRAVENOUS; SUBCUTANEOUS at 13:05

## 2022-10-31 RX ADMIN — FAMOTIDINE 20 MG: 20 TABLET ORAL at 18:18

## 2022-11-01 ENCOUNTER — ANESTHESIA (OUTPATIENT)
Dept: PERIOP | Facility: HOSPITAL | Age: 42
End: 2022-11-01

## 2022-11-01 ENCOUNTER — ANCILLARY PROCEDURE (OUTPATIENT)
Dept: PERIOP | Facility: HOSPITAL | Age: 42
End: 2022-11-01

## 2022-11-01 ENCOUNTER — APPOINTMENT (OUTPATIENT)
Dept: GENERAL RADIOLOGY | Facility: HOSPITAL | Age: 42
End: 2022-11-01

## 2022-11-01 ENCOUNTER — ANESTHESIA EVENT CONVERTED (OUTPATIENT)
Dept: ANESTHESIOLOGY | Facility: HOSPITAL | Age: 42
End: 2022-11-01

## 2022-11-01 PROBLEM — E03.9 ACQUIRED HYPOTHYROIDISM: Chronic | Status: ACTIVE | Noted: 2017-07-27

## 2022-11-01 PROBLEM — Z79.4 TYPE 2 DIABETES MELLITUS WITH HYPERGLYCEMIA, WITH LONG-TERM CURRENT USE OF INSULIN (HCC): Chronic | Status: ACTIVE | Noted: 2017-07-27

## 2022-11-01 PROBLEM — E11.65 TYPE 2 DIABETES MELLITUS WITH HYPERGLYCEMIA, WITH LONG-TERM CURRENT USE OF INSULIN: Chronic | Status: ACTIVE | Noted: 2017-07-27

## 2022-11-01 PROBLEM — E66.9 CLASS 2 OBESITY IN ADULT: Chronic | Status: ACTIVE | Noted: 2017-07-27

## 2022-11-01 PROBLEM — E66.9 CLASS 2 OBESITY IN ADULT: Status: ACTIVE | Noted: 2017-07-27

## 2022-11-01 PROBLEM — F12.90 MARIJUANA USE: Status: ACTIVE | Noted: 2022-11-01

## 2022-11-01 LAB
ALBUMIN SERPL-MCNC: 3.9 G/DL (ref 3.5–5.2)
ALBUMIN SERPL-MCNC: 4 G/DL (ref 3.5–5.2)
ALBUMIN SERPL-MCNC: 4.4 G/DL (ref 3.5–5.2)
ALBUMIN/GLOB SERPL: 1.3 G/DL
ALP SERPL-CCNC: 87 U/L (ref 39–117)
ALT SERPL W P-5'-P-CCNC: 22 U/L (ref 1–33)
ANION GAP SERPL CALCULATED.3IONS-SCNC: 11 MMOL/L (ref 5–15)
ANION GAP SERPL CALCULATED.3IONS-SCNC: 11 MMOL/L (ref 5–15)
ANION GAP SERPL CALCULATED.3IONS-SCNC: 12 MMOL/L (ref 5–15)
ANION GAP SERPL CALCULATED.3IONS-SCNC: 12 MMOL/L (ref 5–15)
APTT PPP: 37.7 SECONDS (ref 22–39)
ARTERIAL PATENCY WRIST A: ABNORMAL
AST SERPL-CCNC: 14 U/L (ref 1–32)
ATMOSPHERIC PRESS: ABNORMAL MM[HG]
B-HCG UR QL: NEGATIVE
BASE EXCESS BLDA CALC-SCNC: -1.5 MMOL/L (ref 0–2)
BASE EXCESS BLDA CALC-SCNC: -3.1 MMOL/L (ref 0–2)
BASE EXCESS BLDA CALC-SCNC: -3.1 MMOL/L (ref 0–2)
BASOPHILS # BLD AUTO: 0.1 10*3/MM3 (ref 0–0.2)
BASOPHILS # BLD AUTO: 0.13 10*3/MM3 (ref 0–0.2)
BASOPHILS NFR BLD AUTO: 0.8 % (ref 0–1.5)
BASOPHILS NFR BLD AUTO: 1 % (ref 0–1.5)
BDY SITE: ABNORMAL
BILIRUB SERPL-MCNC: 0.4 MG/DL (ref 0–1.2)
BODY TEMPERATURE: 37 C
BUN SERPL-MCNC: 13 MG/DL (ref 6–20)
BUN SERPL-MCNC: 14 MG/DL (ref 6–20)
BUN SERPL-MCNC: 14 MG/DL (ref 6–20)
BUN SERPL-MCNC: 15 MG/DL (ref 6–20)
BUN/CREAT SERPL: 18.9 (ref 7–25)
BUN/CREAT SERPL: 18.9 (ref 7–25)
BUN/CREAT SERPL: 19.4 (ref 7–25)
BUN/CREAT SERPL: 20.8 (ref 7–25)
CA-I SERPL ISE-MCNC: 1.45 MMOL/L (ref 1.12–1.32)
CALCIUM SPEC-SCNC: 10.2 MG/DL (ref 8.6–10.5)
CALCIUM SPEC-SCNC: 8.8 MG/DL (ref 8.6–10.5)
CALCIUM SPEC-SCNC: 9.5 MG/DL (ref 8.6–10.5)
CALCIUM SPEC-SCNC: 9.9 MG/DL (ref 8.6–10.5)
CHLORIDE SERPL-SCNC: 102 MMOL/L (ref 98–107)
CHLORIDE SERPL-SCNC: 105 MMOL/L (ref 98–107)
CHLORIDE SERPL-SCNC: 99 MMOL/L (ref 98–107)
CHLORIDE SERPL-SCNC: 99 MMOL/L (ref 98–107)
CO2 BLDA-SCNC: 24.7 MMOL/L (ref 22–33)
CO2 BLDA-SCNC: 24.8 MMOL/L (ref 22–33)
CO2 BLDA-SCNC: 26 MMOL/L (ref 22–33)
CO2 SERPL-SCNC: 22 MMOL/L (ref 22–29)
CO2 SERPL-SCNC: 23 MMOL/L (ref 22–29)
CO2 SERPL-SCNC: 26 MMOL/L (ref 22–29)
CO2 SERPL-SCNC: 27 MMOL/L (ref 22–29)
COHGB MFR BLD: 0.7 % (ref 0–2)
COHGB MFR BLD: 1 % (ref 0–2)
COHGB MFR BLD: 1 % (ref 0–2)
CREAT SERPL-MCNC: 0.67 MG/DL (ref 0.57–1)
CREAT SERPL-MCNC: 0.72 MG/DL (ref 0.57–1)
CREAT SERPL-MCNC: 0.74 MG/DL (ref 0.57–1)
CREAT SERPL-MCNC: 0.74 MG/DL (ref 0.57–1)
DEPRECATED RDW RBC AUTO: 40.1 FL (ref 37–54)
DEPRECATED RDW RBC AUTO: 40.4 FL (ref 37–54)
DEPRECATED RDW RBC AUTO: 40.5 FL (ref 37–54)
DEPRECATED RDW RBC AUTO: 41.7 FL (ref 37–54)
EGFRCR SERPLBLD CKD-EPI 2021: 103.7 ML/MIN/1.73
EGFRCR SERPLBLD CKD-EPI 2021: 103.7 ML/MIN/1.73
EGFRCR SERPLBLD CKD-EPI 2021: 107.2 ML/MIN/1.73
EGFRCR SERPLBLD CKD-EPI 2021: 112.1 ML/MIN/1.73
EOSINOPHIL # BLD AUTO: 0.27 10*3/MM3 (ref 0–0.4)
EOSINOPHIL # BLD AUTO: 0.32 10*3/MM3 (ref 0–0.4)
EOSINOPHIL NFR BLD AUTO: 2.2 % (ref 0.3–6.2)
EOSINOPHIL NFR BLD AUTO: 2.5 % (ref 0.3–6.2)
EPAP: 0
ERYTHROCYTE [DISTWIDTH] IN BLOOD BY AUTOMATED COUNT: 13.1 % (ref 12.3–15.4)
ERYTHROCYTE [DISTWIDTH] IN BLOOD BY AUTOMATED COUNT: 13.1 % (ref 12.3–15.4)
ERYTHROCYTE [DISTWIDTH] IN BLOOD BY AUTOMATED COUNT: 13.2 % (ref 12.3–15.4)
ERYTHROCYTE [DISTWIDTH] IN BLOOD BY AUTOMATED COUNT: 13.4 % (ref 12.3–15.4)
GLOBULIN UR ELPH-MCNC: 2.9 GM/DL
GLUCOSE BLDC GLUCOMTR-MCNC: 159 MG/DL (ref 70–130)
GLUCOSE BLDC GLUCOMTR-MCNC: 161 MG/DL (ref 70–130)
GLUCOSE BLDC GLUCOMTR-MCNC: 166 MG/DL (ref 70–130)
GLUCOSE BLDC GLUCOMTR-MCNC: 174 MG/DL (ref 70–130)
GLUCOSE BLDC GLUCOMTR-MCNC: 175 MG/DL (ref 70–130)
GLUCOSE BLDC GLUCOMTR-MCNC: 188 MG/DL (ref 70–130)
GLUCOSE BLDC GLUCOMTR-MCNC: 198 MG/DL (ref 70–130)
GLUCOSE BLDC GLUCOMTR-MCNC: 243 MG/DL (ref 70–130)
GLUCOSE BLDC GLUCOMTR-MCNC: 287 MG/DL (ref 70–130)
GLUCOSE SERPL-MCNC: 174 MG/DL (ref 65–99)
GLUCOSE SERPL-MCNC: 179 MG/DL (ref 65–99)
GLUCOSE SERPL-MCNC: 219 MG/DL (ref 65–99)
GLUCOSE SERPL-MCNC: 273 MG/DL (ref 65–99)
HCO3 BLDA-SCNC: 23.3 MMOL/L (ref 20–26)
HCO3 BLDA-SCNC: 23.4 MMOL/L (ref 20–26)
HCO3 BLDA-SCNC: 24.6 MMOL/L (ref 20–26)
HCT VFR BLD AUTO: 35.7 % (ref 34–46.6)
HCT VFR BLD AUTO: 36.7 % (ref 34–46.6)
HCT VFR BLD AUTO: 42.3 % (ref 34–46.6)
HCT VFR BLD AUTO: 42.5 % (ref 34–46.6)
HCT VFR BLD CALC: 37 % (ref 38–51)
HCT VFR BLD CALC: 37.1 % (ref 38–51)
HCT VFR BLD CALC: 37.9 % (ref 38–51)
HGB BLD-MCNC: 12 G/DL (ref 12–15.9)
HGB BLD-MCNC: 12 G/DL (ref 12–15.9)
HGB BLD-MCNC: 14.2 G/DL (ref 12–15.9)
HGB BLD-MCNC: 14.2 G/DL (ref 12–15.9)
HGB BLDA-MCNC: 12.1 G/DL (ref 14–18)
HGB BLDA-MCNC: 12.1 G/DL (ref 14–18)
HGB BLDA-MCNC: 12.4 G/DL (ref 14–18)
IMM GRANULOCYTES # BLD AUTO: 0.18 10*3/MM3 (ref 0–0.05)
IMM GRANULOCYTES # BLD AUTO: 0.2 10*3/MM3 (ref 0–0.05)
IMM GRANULOCYTES NFR BLD AUTO: 1.4 % (ref 0–0.5)
IMM GRANULOCYTES NFR BLD AUTO: 1.6 % (ref 0–0.5)
INHALED O2 CONCENTRATION: 100 %
INHALED O2 CONCENTRATION: 40 %
INHALED O2 CONCENTRATION: 40 %
INR PPP: 1.32 (ref 0.84–1.13)
IPAP: 0
LYMPHOCYTES # BLD AUTO: 2.39 10*3/MM3 (ref 0.7–3.1)
LYMPHOCYTES # BLD AUTO: 2.92 10*3/MM3 (ref 0.7–3.1)
LYMPHOCYTES NFR BLD AUTO: 19.1 % (ref 19.6–45.3)
LYMPHOCYTES NFR BLD AUTO: 22.6 % (ref 19.6–45.3)
MAGNESIUM SERPL-MCNC: 1.8 MG/DL (ref 1.6–2.6)
MAGNESIUM SERPL-MCNC: 2 MG/DL (ref 1.6–2.6)
MCH RBC QN AUTO: 28.3 PG (ref 26.6–33)
MCH RBC QN AUTO: 28.4 PG (ref 26.6–33)
MCH RBC QN AUTO: 28.5 PG (ref 26.6–33)
MCH RBC QN AUTO: 28.5 PG (ref 26.6–33)
MCHC RBC AUTO-ENTMCNC: 32.7 G/DL (ref 31.5–35.7)
MCHC RBC AUTO-ENTMCNC: 33.4 G/DL (ref 31.5–35.7)
MCHC RBC AUTO-ENTMCNC: 33.6 G/DL (ref 31.5–35.7)
MCHC RBC AUTO-ENTMCNC: 33.6 G/DL (ref 31.5–35.7)
MCV RBC AUTO: 84.3 FL (ref 79–97)
MCV RBC AUTO: 84.8 FL (ref 79–97)
MCV RBC AUTO: 85.2 FL (ref 79–97)
MCV RBC AUTO: 86.8 FL (ref 79–97)
METHGB BLD QL: 0.6 % (ref 0–1.5)
METHGB BLD QL: 0.7 % (ref 0–1.5)
METHGB BLD QL: 0.9 % (ref 0–1.5)
MODALITY: ABNORMAL
MONOCYTES # BLD AUTO: 0.65 10*3/MM3 (ref 0.1–0.9)
MONOCYTES # BLD AUTO: 0.65 10*3/MM3 (ref 0.1–0.9)
MONOCYTES NFR BLD AUTO: 5 % (ref 5–12)
MONOCYTES NFR BLD AUTO: 5.2 % (ref 5–12)
NEUTROPHILS NFR BLD AUTO: 67.5 % (ref 42.7–76)
NEUTROPHILS NFR BLD AUTO: 71.1 % (ref 42.7–76)
NEUTROPHILS NFR BLD AUTO: 8.71 10*3/MM3 (ref 1.7–7)
NEUTROPHILS NFR BLD AUTO: 8.88 10*3/MM3 (ref 1.7–7)
NOTE: ABNORMAL
NRBC BLD AUTO-RTO: 0 /100 WBC (ref 0–0.2)
NRBC BLD AUTO-RTO: 0 /100 WBC (ref 0–0.2)
OXYHGB MFR BLDV: 95.1 % (ref 94–99)
OXYHGB MFR BLDV: 96.7 % (ref 94–99)
OXYHGB MFR BLDV: 98 % (ref 94–99)
PA ADP PRP-ACNC: 160 PRU
PA ADP PRP-ACNC: 177 PRU
PAW @ PEAK INSP FLOW SETTING VENT: 0 CMH2O
PCO2 BLDA: 46 MM HG (ref 35–45)
PCO2 BLDA: 46.4 MM HG (ref 35–45)
PCO2 BLDA: 46.9 MM HG (ref 35–45)
PCO2 TEMP ADJ BLD: 46 MM HG (ref 35–45)
PCO2 TEMP ADJ BLD: 46.4 MM HG (ref 35–45)
PCO2 TEMP ADJ BLD: 46.9 MM HG (ref 35–45)
PEEP RESPIRATORY: 5 CM[H2O]
PH BLDA: 7.31 PH UNITS (ref 7.35–7.45)
PH BLDA: 7.31 PH UNITS (ref 7.35–7.45)
PH BLDA: 7.34 PH UNITS (ref 7.35–7.45)
PH, TEMP CORRECTED: 7.31 PH UNITS
PH, TEMP CORRECTED: 7.31 PH UNITS
PH, TEMP CORRECTED: 7.34 PH UNITS
PHOSPHATE SERPL-MCNC: 4.4 MG/DL (ref 2.5–4.5)
PHOSPHATE SERPL-MCNC: 6 MG/DL (ref 2.5–4.5)
PLATELET # BLD AUTO: 129 10*3/MM3 (ref 140–450)
PLATELET # BLD AUTO: 134 10*3/MM3 (ref 140–450)
PLATELET # BLD AUTO: 78 10*3/MM3 (ref 140–450)
PLATELET # BLD AUTO: 81 10*3/MM3 (ref 140–450)
PMV BLD AUTO: 12.7 FL (ref 6–12)
PMV BLD AUTO: 12.8 FL (ref 6–12)
PMV BLD AUTO: 13 FL (ref 6–12)
PMV BLD AUTO: 13.5 FL (ref 6–12)
PO2 BLDA: 118 MM HG (ref 83–108)
PO2 BLDA: 237 MM HG (ref 83–108)
PO2 BLDA: 93.3 MM HG (ref 83–108)
PO2 TEMP ADJ BLD: 118 MM HG (ref 83–108)
PO2 TEMP ADJ BLD: 237 MM HG (ref 83–108)
PO2 TEMP ADJ BLD: 93.3 MM HG (ref 83–108)
POTASSIUM SERPL-SCNC: 3.6 MMOL/L (ref 3.5–5.2)
POTASSIUM SERPL-SCNC: 4.1 MMOL/L (ref 3.5–5.2)
POTASSIUM SERPL-SCNC: 4.7 MMOL/L (ref 3.5–5.2)
POTASSIUM SERPL-SCNC: 4.9 MMOL/L (ref 3.5–5.2)
PROT SERPL-MCNC: 6.8 G/DL (ref 6–8.5)
PROTHROMBIN TIME: 16.3 SECONDS (ref 11.4–14.4)
PSV: 10 CMH2O
RBC # BLD AUTO: 4.21 10*6/MM3 (ref 3.77–5.28)
RBC # BLD AUTO: 4.23 10*6/MM3 (ref 3.77–5.28)
RBC # BLD AUTO: 4.99 10*6/MM3 (ref 3.77–5.28)
RBC # BLD AUTO: 5.02 10*6/MM3 (ref 3.77–5.28)
SODIUM SERPL-SCNC: 136 MMOL/L (ref 136–145)
SODIUM SERPL-SCNC: 137 MMOL/L (ref 136–145)
SODIUM SERPL-SCNC: 137 MMOL/L (ref 136–145)
SODIUM SERPL-SCNC: 139 MMOL/L (ref 136–145)
TOTAL RATE: 0 BREATHS/MINUTE
TOTAL RATE: 0 BREATHS/MINUTE
TOTAL RATE: 16 BREATHS/MINUTE
VENTILATOR MODE: ABNORMAL
VT ON VENT VENT: 0.38 ML
WBC NRBC COR # BLD: 12.5 10*3/MM3 (ref 3.4–10.8)
WBC NRBC COR # BLD: 12.9 10*3/MM3 (ref 3.4–10.8)
WBC NRBC COR # BLD: 18.25 10*3/MM3 (ref 3.4–10.8)
WBC NRBC COR # BLD: 20.71 10*3/MM3 (ref 3.4–10.8)

## 2022-11-01 PROCEDURE — 25010000002 FENTANYL CITRATE (PF) 50 MCG/ML SOLUTION: Performed by: THORACIC SURGERY (CARDIOTHORACIC VASCULAR SURGERY)

## 2022-11-01 PROCEDURE — P9041 ALBUMIN (HUMAN),5%, 50ML: HCPCS

## 2022-11-01 PROCEDURE — 71045 X-RAY EXAM CHEST 1 VIEW: CPT

## 2022-11-01 PROCEDURE — 25010000002 ALBUMIN HUMAN 5% PER 50 ML: Performed by: ANESTHESIOLOGY

## 2022-11-01 PROCEDURE — 82375 ASSAY CARBOXYHB QUANT: CPT

## 2022-11-01 PROCEDURE — 25010000002 CEFAZOLIN PER 500 MG: Performed by: ANESTHESIOLOGY

## 2022-11-01 PROCEDURE — 25010000002 ALBUMIN HUMAN 5% PER 50 ML

## 2022-11-01 PROCEDURE — 93318 ECHO TRANSESOPHAGEAL INTRAOP: CPT | Performed by: ANESTHESIOLOGY

## 2022-11-01 PROCEDURE — 85576 BLOOD PLATELET AGGREGATION: CPT

## 2022-11-01 PROCEDURE — 82805 BLOOD GASES W/O2 SATURATION: CPT

## 2022-11-01 PROCEDURE — 25010000002 FENTANYL CITRATE (PF) 250 MCG/5ML SOLUTION: Performed by: ANESTHESIOLOGY

## 2022-11-01 PROCEDURE — 94799 UNLISTED PULMONARY SVC/PX: CPT

## 2022-11-01 PROCEDURE — 80053 COMPREHEN METABOLIC PANEL: CPT | Performed by: INTERNAL MEDICINE

## 2022-11-01 PROCEDURE — 85014 HEMATOCRIT: CPT

## 2022-11-01 PROCEDURE — 25010000002 MIDAZOLAM PER 1 MG: Performed by: ANESTHESIOLOGY

## 2022-11-01 PROCEDURE — 33268 EXCL LAA OPN OTH PX ANY METH: CPT | Performed by: THORACIC SURGERY (CARDIOTHORACIC VASCULAR SURGERY)

## 2022-11-01 PROCEDURE — 85025 COMPLETE CBC W/AUTO DIFF WBC: CPT | Performed by: INTERNAL MEDICINE

## 2022-11-01 PROCEDURE — 82947 ASSAY GLUCOSE BLOOD QUANT: CPT

## 2022-11-01 PROCEDURE — 0 MAGNESIUM SULFATE 4 GM/100ML SOLUTION: Performed by: THORACIC SURGERY (CARDIOTHORACIC VASCULAR SURGERY)

## 2022-11-01 PROCEDURE — C1751 CATH, INF, PER/CENT/MIDLINE: HCPCS | Performed by: ANESTHESIOLOGY

## 2022-11-01 PROCEDURE — 63710000001 INSULIN DETEMIR PER 5 UNITS: Performed by: NURSE PRACTITIONER

## 2022-11-01 PROCEDURE — 84100 ASSAY OF PHOSPHORUS: CPT | Performed by: INTERNAL MEDICINE

## 2022-11-01 PROCEDURE — 82803 BLOOD GASES ANY COMBINATION: CPT

## 2022-11-01 PROCEDURE — 25010000002 CEFAZOLIN PER 500 MG

## 2022-11-01 PROCEDURE — 85347 COAGULATION TIME ACTIVATED: CPT

## 2022-11-01 PROCEDURE — 85027 COMPLETE CBC AUTOMATED: CPT | Performed by: PHYSICIAN ASSISTANT

## 2022-11-01 PROCEDURE — 25010000002 HEPARIN (PORCINE) PER 1000 UNITS: Performed by: ANESTHESIOLOGY

## 2022-11-01 PROCEDURE — 83735 ASSAY OF MAGNESIUM: CPT | Performed by: INTERNAL MEDICINE

## 2022-11-01 PROCEDURE — 02100A9 BYPASS CORONARY ARTERY, ONE ARTERY FROM LEFT INTERNAL MAMMARY WITH AUTOLOGOUS ARTERIAL TISSUE, OPEN APPROACH: ICD-10-PCS | Performed by: THORACIC SURGERY (CARDIOTHORACIC VASCULAR SURGERY)

## 2022-11-01 PROCEDURE — 25010000002 PAPAVERINE PER 60 MG: Performed by: THORACIC SURGERY (CARDIOTHORACIC VASCULAR SURGERY)

## 2022-11-01 PROCEDURE — 94761 N-INVAS EAR/PLS OXIMETRY MLT: CPT

## 2022-11-01 PROCEDURE — 63710000001 INSULIN LISPRO (HUMAN) PER 5 UNITS: Performed by: INTERNAL MEDICINE

## 2022-11-01 PROCEDURE — 33533 CABG ARTERIAL SINGLE: CPT | Performed by: THORACIC SURGERY (CARDIOTHORACIC VASCULAR SURGERY)

## 2022-11-01 PROCEDURE — 63710000001 INSULIN LISPRO (HUMAN) PER 5 UNITS: Performed by: NURSE PRACTITIONER

## 2022-11-01 PROCEDURE — 63710000001 INSULIN REGULAR HUMAN PER 5 UNITS: Performed by: ANESTHESIOLOGY

## 2022-11-01 PROCEDURE — 5A1221Z PERFORMANCE OF CARDIAC OUTPUT, CONTINUOUS: ICD-10-PCS | Performed by: THORACIC SURGERY (CARDIOTHORACIC VASCULAR SURGERY)

## 2022-11-01 PROCEDURE — 83735 ASSAY OF MAGNESIUM: CPT | Performed by: PHYSICIAN ASSISTANT

## 2022-11-01 PROCEDURE — P9041 ALBUMIN (HUMAN),5%, 50ML: HCPCS | Performed by: ANESTHESIOLOGY

## 2022-11-01 PROCEDURE — 25010000002 HEPARIN (PORCINE) PER 1000 UNITS: Performed by: THORACIC SURGERY (CARDIOTHORACIC VASCULAR SURGERY)

## 2022-11-01 PROCEDURE — 82962 GLUCOSE BLOOD TEST: CPT

## 2022-11-01 PROCEDURE — 83050 HGB METHEMOGLOBIN QUAN: CPT

## 2022-11-01 PROCEDURE — 25010000002 PROTAMINE SULFATE PER 10 MG: Performed by: ANESTHESIOLOGY

## 2022-11-01 PROCEDURE — 85610 PROTHROMBIN TIME: CPT | Performed by: PHYSICIAN ASSISTANT

## 2022-11-01 PROCEDURE — 25810000003 DEXTROSE 5 % WITH KCL 20 MEQ 20-5 MEQ/L-% SOLUTION: Performed by: PHYSICIAN ASSISTANT

## 2022-11-01 PROCEDURE — 93010 ELECTROCARDIOGRAM REPORT: CPT | Performed by: INTERNAL MEDICINE

## 2022-11-01 PROCEDURE — 99233 SBSQ HOSP IP/OBS HIGH 50: CPT | Performed by: INTERNAL MEDICINE

## 2022-11-01 PROCEDURE — 82330 ASSAY OF CALCIUM: CPT

## 2022-11-01 PROCEDURE — 84132 ASSAY OF SERUM POTASSIUM: CPT

## 2022-11-01 PROCEDURE — 93005 ELECTROCARDIOGRAM TRACING: CPT | Performed by: PHYSICIAN ASSISTANT

## 2022-11-01 PROCEDURE — 02L70CK OCCLUSION OF LEFT ATRIAL APPENDAGE WITH EXTRALUMINAL DEVICE, OPEN APPROACH: ICD-10-PCS | Performed by: THORACIC SURGERY (CARDIOTHORACIC VASCULAR SURGERY)

## 2022-11-01 PROCEDURE — 84295 ASSAY OF SERUM SODIUM: CPT

## 2022-11-01 PROCEDURE — 0 POTASSIUM CHLORIDE PER 2 MEQ: Performed by: PHYSICIAN ASSISTANT

## 2022-11-01 PROCEDURE — 94002 VENT MGMT INPAT INIT DAY: CPT

## 2022-11-01 PROCEDURE — 82330 ASSAY OF CALCIUM: CPT | Performed by: PHYSICIAN ASSISTANT

## 2022-11-01 PROCEDURE — 85730 THROMBOPLASTIN TIME PARTIAL: CPT | Performed by: PHYSICIAN ASSISTANT

## 2022-11-01 PROCEDURE — 85025 COMPLETE CBC W/AUTO DIFF WBC: CPT

## 2022-11-01 PROCEDURE — 25010000002 CEFAZOLIN IN DEXTROSE 2-4 GM/100ML-% SOLUTION: Performed by: PHYSICIAN ASSISTANT

## 2022-11-01 DEVICE — APPL CLIP LAA ATRICLIP FLX 35MM: Type: IMPLANTABLE DEVICE | Site: HEART | Status: FUNCTIONAL

## 2022-11-01 RX ORDER — DEXTROSE MONOHYDRATE 25 G/50ML
10-50 INJECTION, SOLUTION INTRAVENOUS
Status: DISCONTINUED | OUTPATIENT
Start: 2022-11-01 | End: 2022-11-03

## 2022-11-01 RX ORDER — DEXTROSE MONOHYDRATE 25 G/50ML
10-50 INJECTION, SOLUTION INTRAVENOUS
Status: DISCONTINUED | OUTPATIENT
Start: 2022-11-01 | End: 2022-11-01 | Stop reason: HOSPADM

## 2022-11-01 RX ORDER — GLYCOPYRROLATE 0.2 MG/ML
INJECTION INTRAMUSCULAR; INTRAVENOUS AS NEEDED
Status: DISCONTINUED | OUTPATIENT
Start: 2022-11-01 | End: 2022-11-01 | Stop reason: SURG

## 2022-11-01 RX ORDER — SODIUM CHLORIDE 0.9 % (FLUSH) 0.9 %
10 SYRINGE (ML) INJECTION AS NEEDED
Status: DISCONTINUED | OUTPATIENT
Start: 2022-11-01 | End: 2022-11-01 | Stop reason: HOSPADM

## 2022-11-01 RX ORDER — ASPIRIN 325 MG
325 TABLET ORAL ONCE
Status: COMPLETED | OUTPATIENT
Start: 2022-11-01 | End: 2022-11-01

## 2022-11-01 RX ORDER — BISACODYL 10 MG
10 SUPPOSITORY, RECTAL RECTAL DAILY PRN
Status: DISCONTINUED | OUTPATIENT
Start: 2022-11-01 | End: 2022-11-05 | Stop reason: SDUPTHER

## 2022-11-01 RX ORDER — SODIUM CHLORIDE 0.9 % (FLUSH) 0.9 %
10 SYRINGE (ML) INJECTION EVERY 12 HOURS SCHEDULED
Status: DISCONTINUED | OUTPATIENT
Start: 2022-11-01 | End: 2022-11-01 | Stop reason: HOSPADM

## 2022-11-01 RX ORDER — ATORVASTATIN CALCIUM 40 MG/1
40 TABLET, FILM COATED ORAL NIGHTLY
Status: DISCONTINUED | OUTPATIENT
Start: 2022-11-01 | End: 2022-11-06 | Stop reason: HOSPADM

## 2022-11-01 RX ORDER — ALBUMIN, HUMAN INJ 5% 5 %
SOLUTION INTRAVENOUS CONTINUOUS PRN
Status: DISCONTINUED | OUTPATIENT
Start: 2022-11-01 | End: 2022-11-01 | Stop reason: SURG

## 2022-11-01 RX ORDER — AMOXICILLIN 250 MG
2 CAPSULE ORAL 2 TIMES DAILY
Status: DISCONTINUED | OUTPATIENT
Start: 2022-11-01 | End: 2022-11-06 | Stop reason: HOSPADM

## 2022-11-01 RX ORDER — ALBUMIN, HUMAN INJ 5% 5 %
250 SOLUTION INTRAVENOUS AS NEEDED
Status: DISCONTINUED | OUTPATIENT
Start: 2022-11-01 | End: 2022-11-03

## 2022-11-01 RX ORDER — HEPARIN SODIUM 1000 [USP'U]/ML
INJECTION, SOLUTION INTRAVENOUS; SUBCUTANEOUS AS NEEDED
Status: DISCONTINUED | OUTPATIENT
Start: 2022-11-01 | End: 2022-11-01 | Stop reason: SURG

## 2022-11-01 RX ORDER — MEPERIDINE HYDROCHLORIDE 25 MG/ML
25 INJECTION INTRAMUSCULAR; INTRAVENOUS; SUBCUTANEOUS EVERY 4 HOURS PRN
Status: ACTIVE | OUTPATIENT
Start: 2022-11-01 | End: 2022-11-02

## 2022-11-01 RX ORDER — ONDANSETRON 2 MG/ML
4 INJECTION INTRAMUSCULAR; INTRAVENOUS EVERY 6 HOURS PRN
Status: DISCONTINUED | OUTPATIENT
Start: 2022-11-01 | End: 2022-11-06 | Stop reason: HOSPADM

## 2022-11-01 RX ORDER — SODIUM CHLORIDE, SODIUM LACTATE, POTASSIUM CHLORIDE, CALCIUM CHLORIDE 600; 310; 30; 20 MG/100ML; MG/100ML; MG/100ML; MG/100ML
9 INJECTION, SOLUTION INTRAVENOUS CONTINUOUS
Status: DISCONTINUED | OUTPATIENT
Start: 2022-11-01 | End: 2022-11-01

## 2022-11-01 RX ORDER — PAPAVERINE HYDROCHLORIDE 30 MG/ML
INJECTION INTRAMUSCULAR; INTRAVENOUS AS NEEDED
Status: DISCONTINUED | OUTPATIENT
Start: 2022-11-01 | End: 2022-11-01 | Stop reason: HOSPADM

## 2022-11-01 RX ORDER — POTASSIUM CHLORIDE 1.5 G/1.77G
40 POWDER, FOR SOLUTION ORAL AS NEEDED
Status: DISCONTINUED | OUTPATIENT
Start: 2022-11-01 | End: 2022-11-06 | Stop reason: HOSPADM

## 2022-11-01 RX ORDER — FENTANYL CITRATE 50 UG/ML
25 INJECTION, SOLUTION INTRAMUSCULAR; INTRAVENOUS
Status: DISCONTINUED | OUTPATIENT
Start: 2022-11-01 | End: 2022-11-02

## 2022-11-01 RX ORDER — POTASSIUM CHLORIDE 29.8 MG/ML
20 INJECTION INTRAVENOUS
Status: DISCONTINUED | OUTPATIENT
Start: 2022-11-01 | End: 2022-11-05

## 2022-11-01 RX ORDER — CHLORHEXIDINE GLUCONATE 0.12 MG/ML
15 RINSE ORAL EVERY 12 HOURS SCHEDULED
Status: DISCONTINUED | OUTPATIENT
Start: 2022-11-01 | End: 2022-11-02

## 2022-11-01 RX ORDER — ALBUMIN, HUMAN INJ 5% 5 %
SOLUTION INTRAVENOUS
Status: COMPLETED
Start: 2022-11-01 | End: 2022-11-01

## 2022-11-01 RX ORDER — SODIUM CHLORIDE 9 MG/ML
INJECTION, SOLUTION INTRAVENOUS AS NEEDED
Status: DISCONTINUED | OUTPATIENT
Start: 2022-11-01 | End: 2022-11-01 | Stop reason: HOSPADM

## 2022-11-01 RX ORDER — METOPROLOL TARTRATE 5 MG/5ML
2.5 INJECTION INTRAVENOUS EVERY 6 HOURS SCHEDULED
Status: ACTIVE | OUTPATIENT
Start: 2022-11-01 | End: 2022-11-02

## 2022-11-01 RX ORDER — HYDROCODONE BITARTRATE AND ACETAMINOPHEN 7.5; 325 MG/1; MG/1
1 TABLET ORAL EVERY 4 HOURS PRN
Status: DISCONTINUED | OUTPATIENT
Start: 2022-11-01 | End: 2022-11-06 | Stop reason: HOSPADM

## 2022-11-01 RX ORDER — PROTAMINE SULFATE 10 MG/ML
INJECTION, SOLUTION INTRAVENOUS AS NEEDED
Status: DISCONTINUED | OUTPATIENT
Start: 2022-11-01 | End: 2022-11-01 | Stop reason: SURG

## 2022-11-01 RX ORDER — DOPAMINE HYDROCHLORIDE 160 MG/100ML
2-20 INJECTION, SOLUTION INTRAVENOUS CONTINUOUS PRN
Status: DISCONTINUED | OUTPATIENT
Start: 2022-11-01 | End: 2022-11-02

## 2022-11-01 RX ORDER — VECURONIUM BROMIDE 20 MG/20ML
INJECTION, POWDER, LYOPHILIZED, FOR SOLUTION INTRAVENOUS AS NEEDED
Status: DISCONTINUED | OUTPATIENT
Start: 2022-11-01 | End: 2022-11-01 | Stop reason: SURG

## 2022-11-01 RX ORDER — GABAPENTIN 100 MG/1
100 CAPSULE ORAL EVERY 8 HOURS
Status: DISCONTINUED | OUTPATIENT
Start: 2022-11-01 | End: 2022-11-06 | Stop reason: HOSPADM

## 2022-11-01 RX ORDER — MAGNESIUM SULFATE HEPTAHYDRATE 40 MG/ML
4 INJECTION, SOLUTION INTRAVENOUS AS NEEDED
Status: DISCONTINUED | OUTPATIENT
Start: 2022-11-01 | End: 2022-11-06 | Stop reason: HOSPADM

## 2022-11-01 RX ORDER — NOREPINEPHRINE BIT/0.9 % NACL 8 MG/250ML
.02-.3 INFUSION BOTTLE (ML) INTRAVENOUS CONTINUOUS PRN
Status: DISCONTINUED | OUTPATIENT
Start: 2022-11-01 | End: 2022-11-03

## 2022-11-01 RX ORDER — SODIUM CHLORIDE 9 MG/ML
INJECTION, SOLUTION INTRAVENOUS CONTINUOUS PRN
Status: DISCONTINUED | OUTPATIENT
Start: 2022-11-01 | End: 2022-11-01 | Stop reason: SURG

## 2022-11-01 RX ORDER — DEXMEDETOMIDINE HYDROCHLORIDE 4 UG/ML
.2-1.5 INJECTION, SOLUTION INTRAVENOUS CONTINUOUS PRN
Status: DISCONTINUED | OUTPATIENT
Start: 2022-11-01 | End: 2022-11-02

## 2022-11-01 RX ORDER — NICOTINE POLACRILEX 4 MG
15 LOZENGE BUCCAL
Status: DISCONTINUED | OUTPATIENT
Start: 2022-11-01 | End: 2022-11-03

## 2022-11-01 RX ORDER — ROCURONIUM BROMIDE 10 MG/ML
INJECTION, SOLUTION INTRAVENOUS AS NEEDED
Status: DISCONTINUED | OUTPATIENT
Start: 2022-11-01 | End: 2022-11-01 | Stop reason: SURG

## 2022-11-01 RX ORDER — POTASSIUM CHLORIDE, DEXTROSE MONOHYDRATE 150; 5 MG/100ML; G/100ML
30 INJECTION, SOLUTION INTRAVENOUS CONTINUOUS
Status: DISCONTINUED | OUTPATIENT
Start: 2022-11-01 | End: 2022-11-02

## 2022-11-01 RX ORDER — PROTAMINE SULFATE 10 MG/ML
50 INJECTION, SOLUTION INTRAVENOUS ONCE
Status: DISCONTINUED | OUTPATIENT
Start: 2022-11-01 | End: 2022-11-02

## 2022-11-01 RX ORDER — MAGNESIUM SULFATE HEPTAHYDRATE 40 MG/ML
2 INJECTION, SOLUTION INTRAVENOUS AS NEEDED
Status: DISCONTINUED | OUTPATIENT
Start: 2022-11-01 | End: 2022-11-06 | Stop reason: HOSPADM

## 2022-11-01 RX ORDER — NICOTINE POLACRILEX 4 MG
15 LOZENGE BUCCAL
Status: DISCONTINUED | OUTPATIENT
Start: 2022-11-01 | End: 2022-11-01 | Stop reason: HOSPADM

## 2022-11-01 RX ORDER — LIDOCAINE HYDROCHLORIDE 10 MG/ML
0.5 INJECTION, SOLUTION EPIDURAL; INFILTRATION; INTRACAUDAL; PERINEURAL ONCE
Status: COMPLETED | OUTPATIENT
Start: 2022-11-01 | End: 2022-11-01

## 2022-11-01 RX ORDER — ALBUTEROL SULFATE 2.5 MG/3ML
2.5 SOLUTION RESPIRATORY (INHALATION) EVERY 4 HOURS PRN
Status: ACTIVE | OUTPATIENT
Start: 2022-11-01 | End: 2022-11-02

## 2022-11-01 RX ORDER — OXYCODONE HYDROCHLORIDE 5 MG/1
10 TABLET ORAL EVERY 4 HOURS PRN
Status: DISCONTINUED | OUTPATIENT
Start: 2022-11-01 | End: 2022-11-05

## 2022-11-01 RX ORDER — ASPIRIN 325 MG
325 TABLET, DELAYED RELEASE (ENTERIC COATED) ORAL DAILY
Status: DISCONTINUED | OUTPATIENT
Start: 2022-11-02 | End: 2022-11-03

## 2022-11-01 RX ORDER — MAGNESIUM HYDROXIDE 1200 MG/15ML
LIQUID ORAL AS NEEDED
Status: DISCONTINUED | OUTPATIENT
Start: 2022-11-01 | End: 2022-11-01 | Stop reason: HOSPADM

## 2022-11-01 RX ORDER — PHENYLEPHRINE HCL IN 0.9% NACL 1 MG/10 ML
SYRINGE (ML) INTRAVENOUS AS NEEDED
Status: DISCONTINUED | OUTPATIENT
Start: 2022-11-01 | End: 2022-11-01 | Stop reason: SURG

## 2022-11-01 RX ORDER — CEFAZOLIN SODIUM 1 G/3ML
INJECTION, POWDER, FOR SOLUTION INTRAMUSCULAR; INTRAVENOUS AS NEEDED
Status: DISCONTINUED | OUTPATIENT
Start: 2022-11-01 | End: 2022-11-01 | Stop reason: SURG

## 2022-11-01 RX ORDER — POTASSIUM CHLORIDE 750 MG/1
40 CAPSULE, EXTENDED RELEASE ORAL AS NEEDED
Status: DISCONTINUED | OUTPATIENT
Start: 2022-11-01 | End: 2022-11-06 | Stop reason: HOSPADM

## 2022-11-01 RX ORDER — DOBUTAMINE HYDROCHLORIDE 100 MG/100ML
2-20 INJECTION INTRAVENOUS CONTINUOUS PRN
Status: DISCONTINUED | OUTPATIENT
Start: 2022-11-01 | End: 2022-11-02

## 2022-11-01 RX ORDER — ETOMIDATE 2 MG/ML
INJECTION INTRAVENOUS AS NEEDED
Status: DISCONTINUED | OUTPATIENT
Start: 2022-11-01 | End: 2022-11-01 | Stop reason: SURG

## 2022-11-01 RX ORDER — CEFAZOLIN SODIUM 2 G/100ML
2 INJECTION, SOLUTION INTRAVENOUS EVERY 8 HOURS
Status: DISCONTINUED | OUTPATIENT
Start: 2022-11-01 | End: 2022-11-02 | Stop reason: CLARIF

## 2022-11-01 RX ORDER — SODIUM CHLORIDE, SODIUM LACTATE, POTASSIUM CHLORIDE, CALCIUM CHLORIDE 600; 310; 30; 20 MG/100ML; MG/100ML; MG/100ML; MG/100ML
INJECTION, SOLUTION INTRAVENOUS CONTINUOUS PRN
Status: DISCONTINUED | OUTPATIENT
Start: 2022-11-01 | End: 2022-11-01 | Stop reason: SURG

## 2022-11-01 RX ORDER — FENTANYL CITRATE 50 UG/ML
INJECTION, SOLUTION INTRAMUSCULAR; INTRAVENOUS AS NEEDED
Status: DISCONTINUED | OUTPATIENT
Start: 2022-11-01 | End: 2022-11-01 | Stop reason: SURG

## 2022-11-01 RX ORDER — NITROGLYCERIN 20 MG/100ML
5-200 INJECTION INTRAVENOUS CONTINUOUS PRN
Status: DISCONTINUED | OUTPATIENT
Start: 2022-11-01 | End: 2022-11-03

## 2022-11-01 RX ORDER — MIDAZOLAM HYDROCHLORIDE 1 MG/ML
INJECTION INTRAMUSCULAR; INTRAVENOUS AS NEEDED
Status: DISCONTINUED | OUTPATIENT
Start: 2022-11-01 | End: 2022-11-01 | Stop reason: SURG

## 2022-11-01 RX ORDER — POLYETHYLENE GLYCOL 3350 17 G/17G
17 POWDER, FOR SOLUTION ORAL DAILY PRN
Status: DISCONTINUED | OUTPATIENT
Start: 2022-11-01 | End: 2022-11-06 | Stop reason: HOSPADM

## 2022-11-01 RX ORDER — AMINOCAPROIC ACID 250 MG/ML
INJECTION, SOLUTION INTRAVENOUS AS NEEDED
Status: DISCONTINUED | OUTPATIENT
Start: 2022-11-01 | End: 2022-11-01 | Stop reason: SURG

## 2022-11-01 RX ORDER — ACETAMINOPHEN 325 MG/1
650 TABLET ORAL EVERY 8 HOURS
Status: DISCONTINUED | OUTPATIENT
Start: 2022-11-01 | End: 2022-11-06 | Stop reason: HOSPADM

## 2022-11-01 RX ORDER — CALCIUM CHLORIDE 100 MG/ML
INJECTION INTRAVENOUS; INTRAVENTRICULAR AS NEEDED
Status: DISCONTINUED | OUTPATIENT
Start: 2022-11-01 | End: 2022-11-01 | Stop reason: SURG

## 2022-11-01 RX ORDER — MORPHINE SULFATE 2 MG/ML
2 INJECTION, SOLUTION INTRAMUSCULAR; INTRAVENOUS
Status: DISCONTINUED | OUTPATIENT
Start: 2022-11-01 | End: 2022-11-03

## 2022-11-01 RX ADMIN — ATORVASTATIN CALCIUM 40 MG: 40 TABLET, FILM COATED ORAL at 20:03

## 2022-11-01 RX ADMIN — ASPIRIN 325 MG: 325 TABLET ORAL at 19:50

## 2022-11-01 RX ADMIN — VECURONIUM BROMIDE 4 MG: 1 INJECTION, POWDER, LYOPHILIZED, FOR SOLUTION INTRAVENOUS at 14:42

## 2022-11-01 RX ADMIN — FENTANYL CITRATE 200 MCG: 0.05 INJECTION, SOLUTION INTRAMUSCULAR; INTRAVENOUS at 14:13

## 2022-11-01 RX ADMIN — LIDOCAINE HYDROCHLORIDE 100 MG: 20 INJECTION INTRAVENOUS at 13:36

## 2022-11-01 RX ADMIN — Medication 100 MCG: at 14:17

## 2022-11-01 RX ADMIN — INSULIN LISPRO 3 UNITS: 100 INJECTION, SOLUTION INTRAVENOUS; SUBCUTANEOUS at 09:05

## 2022-11-01 RX ADMIN — AMINOCAPROIC ACID 10 G: 250 INJECTION, SOLUTION INTRAVENOUS at 14:26

## 2022-11-01 RX ADMIN — MIDAZOLAM 1.5 MG: 1 INJECTION INTRAMUSCULAR; INTRAVENOUS at 15:55

## 2022-11-01 RX ADMIN — HEPARIN SODIUM 40000 UNITS: 1000 INJECTION, SOLUTION INTRAVENOUS; SUBCUTANEOUS at 15:12

## 2022-11-01 RX ADMIN — Medication 50 MCG: at 14:15

## 2022-11-01 RX ADMIN — HEPARIN SODIUM 5000 UNITS: 1000 INJECTION, SOLUTION INTRAVENOUS; SUBCUTANEOUS at 15:24

## 2022-11-01 RX ADMIN — SCOPALAMINE 1 PATCH: 1 PATCH, EXTENDED RELEASE TRANSDERMAL at 11:44

## 2022-11-01 RX ADMIN — AMINOCAPROIC ACID 10 G: 250 INJECTION, SOLUTION INTRAVENOUS at 16:31

## 2022-11-01 RX ADMIN — FENTANYL CITRATE 100 MCG: 0.05 INJECTION, SOLUTION INTRAMUSCULAR; INTRAVENOUS at 14:49

## 2022-11-01 RX ADMIN — SODIUM CHLORIDE, POTASSIUM CHLORIDE, SODIUM LACTATE AND CALCIUM CHLORIDE: 600; 310; 30; 20 INJECTION, SOLUTION INTRAVENOUS at 13:51

## 2022-11-01 RX ADMIN — CEFAZOLIN SODIUM 2 G: 2 INJECTION, SOLUTION INTRAVENOUS at 21:50

## 2022-11-01 RX ADMIN — MAGNESIUM SULFATE HEPTAHYDRATE 4 G: 40 INJECTION, SOLUTION INTRAVENOUS at 19:50

## 2022-11-01 RX ADMIN — MUPIROCIN 1 APPLICATION: 20 OINTMENT TOPICAL at 06:36

## 2022-11-01 RX ADMIN — LEVOTHYROXINE SODIUM 175 MCG: 150 TABLET ORAL at 08:37

## 2022-11-01 RX ADMIN — MIDAZOLAM 2 MG: 1 INJECTION INTRAMUSCULAR; INTRAVENOUS at 13:32

## 2022-11-01 RX ADMIN — GABAPENTIN 100 MG: 100 CAPSULE ORAL at 19:51

## 2022-11-01 RX ADMIN — FAMOTIDINE 20 MG: 20 TABLET ORAL at 08:40

## 2022-11-01 RX ADMIN — Medication 0.02 MCG/KG/MIN: at 21:02

## 2022-11-01 RX ADMIN — ALBUMIN, HUMAN INJ 5% 250 ML: 5 SOLUTION at 18:00

## 2022-11-01 RX ADMIN — Medication 10 ML: at 08:38

## 2022-11-01 RX ADMIN — SERTRALINE HYDROCHLORIDE 100 MG: 100 TABLET ORAL at 08:37

## 2022-11-01 RX ADMIN — Medication 2 G: at 13:41

## 2022-11-01 RX ADMIN — INSULIN LISPRO 5 UNITS: 100 INJECTION, SOLUTION INTRAVENOUS; SUBCUTANEOUS at 09:05

## 2022-11-01 RX ADMIN — SENNOSIDES AND DOCUSATE SODIUM 2 TABLET: 50; 8.6 TABLET ORAL at 20:03

## 2022-11-01 RX ADMIN — CHLORHEXIDINE GLUCONATE 15 ML: 1.2 SOLUTION ORAL at 20:03

## 2022-11-01 RX ADMIN — FENTANYL CITRATE 100 MCG: 0.05 INJECTION, SOLUTION INTRAMUSCULAR; INTRAVENOUS at 15:31

## 2022-11-01 RX ADMIN — CALCIUM CHLORIDE 1 G: 100 INJECTION INTRAVENOUS; INTRAVENTRICULAR at 16:32

## 2022-11-01 RX ADMIN — SODIUM CHLORIDE 2.2 UNITS/HR: 900 INJECTION INTRAVENOUS at 14:38

## 2022-11-01 RX ADMIN — ACETAMINOPHEN 1000 MG: 500 TABLET ORAL at 11:43

## 2022-11-01 RX ADMIN — CEFAZOLIN 2 G: 1 INJECTION, POWDER, FOR SOLUTION INTRAMUSCULAR; INTRAVENOUS at 16:31

## 2022-11-01 RX ADMIN — MIDAZOLAM 1.5 MG: 1 INJECTION INTRAMUSCULAR; INTRAVENOUS at 15:13

## 2022-11-01 RX ADMIN — CHLORHEXIDINE GLUCONATE 15 ML: 1.2 SOLUTION ORAL at 06:36

## 2022-11-01 RX ADMIN — DEXMEDETOMIDINE HYDROCHLORIDE 0.3 MCG/KG/HR: 100 INJECTION, SOLUTION INTRAVENOUS at 14:24

## 2022-11-01 RX ADMIN — GLYCOPYRROLATE 0.8 MCG: 0.2 INJECTION INTRAMUSCULAR; INTRAVENOUS at 16:12

## 2022-11-01 RX ADMIN — POTASSIUM CHLORIDE 20 MEQ: 29.8 INJECTION, SOLUTION INTRAVENOUS at 18:48

## 2022-11-01 RX ADMIN — PROTAMINE SULFATE 300 MG: 10 INJECTION, SOLUTION INTRAVENOUS at 16:31

## 2022-11-01 RX ADMIN — ALBUMIN HUMAN: 0.05 INJECTION, SOLUTION INTRAVENOUS at 17:02

## 2022-11-01 RX ADMIN — ALBUMIN (HUMAN) 250 ML: 12.5 INJECTION, SOLUTION INTRAVENOUS at 18:00

## 2022-11-01 RX ADMIN — Medication: at 06:38

## 2022-11-01 RX ADMIN — LIDOCAINE HYDROCHLORIDE 0.5 ML: 10 INJECTION, SOLUTION EPIDURAL; INFILTRATION; INTRACAUDAL; PERINEURAL at 11:47

## 2022-11-01 RX ADMIN — FENTANYL CITRATE 25 MCG: 50 INJECTION, SOLUTION INTRAMUSCULAR; INTRAVENOUS at 18:36

## 2022-11-01 RX ADMIN — Medication 10 ML: at 20:04

## 2022-11-01 RX ADMIN — BUSPIRONE HYDROCHLORIDE 5 MG: 5 TABLET ORAL at 08:37

## 2022-11-01 RX ADMIN — INSULIN DETEMIR 20 UNITS: 100 INJECTION, SOLUTION SUBCUTANEOUS at 08:37

## 2022-11-01 RX ADMIN — GABAPENTIN 300 MG: 300 CAPSULE ORAL at 11:43

## 2022-11-01 RX ADMIN — ASPIRIN 81 MG: 81 TABLET, COATED ORAL at 08:37

## 2022-11-01 RX ADMIN — SODIUM CHLORIDE: 9 INJECTION, SOLUTION INTRAVENOUS at 13:51

## 2022-11-01 RX ADMIN — DEXMEDETOMIDINE HYDROCHLORIDE 1 MCG/KG/HR: 4 INJECTION, SOLUTION INTRAVENOUS at 20:06

## 2022-11-01 RX ADMIN — Medication 12.5 MG: at 08:37

## 2022-11-01 RX ADMIN — POTASSIUM CHLORIDE AND DEXTROSE MONOHYDRATE 30 ML/HR: 150; 5 INJECTION, SOLUTION INTRAVENOUS at 17:39

## 2022-11-01 RX ADMIN — SODIUM CHLORIDE, POTASSIUM CHLORIDE, SODIUM LACTATE AND CALCIUM CHLORIDE 9 ML/HR: 600; 310; 30; 20 INJECTION, SOLUTION INTRAVENOUS at 11:47

## 2022-11-01 RX ADMIN — ETOMIDATE 22 MG: 40 INJECTION, SOLUTION INTRAVENOUS at 13:36

## 2022-11-01 RX ADMIN — VECURONIUM BROMIDE 6 MG: 1 INJECTION, POWDER, LYOPHILIZED, FOR SOLUTION INTRAVENOUS at 15:56

## 2022-11-01 RX ADMIN — ROCURONIUM BROMIDE 100 MG: 10 INJECTION INTRAVENOUS at 13:36

## 2022-11-01 RX ADMIN — FENTANYL CITRATE 100 MCG: 0.05 INJECTION, SOLUTION INTRAMUSCULAR; INTRAVENOUS at 13:36

## 2022-11-01 RX ADMIN — FENTANYL CITRATE 25 MCG: 50 INJECTION, SOLUTION INTRAMUSCULAR; INTRAVENOUS at 20:02

## 2022-11-01 RX ADMIN — ACETAMINOPHEN 650 MG: 325 TABLET, FILM COATED ORAL at 19:52

## 2022-11-01 RX ADMIN — Medication 50 MCG: at 15:12

## 2022-11-01 NOTE — ANESTHESIA POSTPROCEDURE EVALUATION
Patient: Tawanna Huang    Procedure Summary     Date: 11/01/22 Room / Location:  [facility] [address] [place] [address] [facility]    Anesthesia Start: 1331 Anesthesia Stop: 1738    Procedure: MEDIAN STERNOTOMY, CORONARY ARTERY BYPASS UTILIZING THE LEFT INTERNAL MAMMARY ARTERY GRAFT, LEFT ATRIAL APPENDAGE LIGATION, GEETA (Chest) Diagnosis:       Coronary artery disease involving native coronary artery of native heart with unstable angina pectoris (HCC)      (Coronary artery disease involving native coronary artery of native heart with unstable angina pectoris (HCC) [I25.110])    Surgeons: Bahman Lane MD Provider: Pavan Lala Jr., MD    Anesthesia Type: general, CVL, [place] ASA Status: 4          Anesthesia Type: general, CVL, C[place]lle    Vitals  No vitals data found for the desired time range.          Post Anesthesia Care and Evaluation    Patient location during evaluation: ICU  Patient participation: complete - patient cannot participate  Level of consciousness: obtunded/minimal responses  Pain score: 0  Pain management: adequate    Airway patency: patent  Anesthetic complications: No anesthetic complications  PONV Status: none  Cardiovascular status: acceptable  Respiratory status: acceptable  Hydration status: acceptable

## 2022-11-01 NOTE — ANESTHESIA PROCEDURE NOTES
Airway  Urgency: elective    Date/Time: 11/1/2022 1:41 PM  Airway not difficult    General Information and Staff    Patient location during procedure: OR  Anesthesiologist: Pavan Lala Jr., MD    Indications and Patient Condition  Indications for airway management: airway protection    Preoxygenated: yes  MILS not maintained throughout  Mask difficulty assessment: 1 - vent by mask    Final Airway Details  Final airway type: endotracheal airway      Successful airway: ETT  Cuffed: yes   Successful intubation technique: direct laryngoscopy  Endotracheal tube insertion site: oral  Blade: Elsie  Blade size: 3  ETT size (mm): 7.5  Cormack-Lehane Classification: grade I - full view of glottis  Placement verified by: chest auscultation and capnometry   Measured from: lips  ETT/EBT  to lips (cm): 21  Number of attempts at approach: 2  Assessment: lips, teeth, and gum same as pre-op and atraumatic intubation    Additional Comments  First attempt with grade 3 view. Unable to pass 8.0 ETT, Second DL with same view and 7.5 ETT passed easily. Negative epigastric sounds, Breath sound equal bilaterally with symmetric chest rise and fall

## 2022-11-01 NOTE — PROGRESS NOTES
Pulmonary/Critical Care Follow-up     LOS: 5 days   Patient Care Team:  Bobby Kiser FNP as PCP - General (Nurse Practitioner)  Micky Irby MD as Consulting Physician (Endocrinology)    Chief Complaint: Chest pain/medical management of issues postoperatively after CABG including but not limited to: Respiratory, electrolyte, and glycemic management.      Subjective     Interval History:   Tawanna Huang is a 42 y.o. female with PMH of T2DM, thyroid CA s/p thyroidectomy, tachy-gilma syndrome s/p PPM admitted to [facility] on 10/27 from [facility] for NSTEMI and CABG evaluation.     The patient presented to the OSH on 10/21 with complaints of intermittent chest pain with acute worsening prompting ED evaluation and EKG consistent with NSTEMI. LHC displayed >90% LAD stenosis with an EF of 50%. She was transferred to [facility] and evaluated by CTS who recommended surgical revascularization and underwent pre-operative work-up. She has been maintained on a Heparin gtt and ASA. TTE results are pending.     UDS + THC.     CXR shows reduced lung volumes. Pre-op PFTs as follows: FVC 2.36, 64%; FEV1 2.05, 69%; and ratio 0.87.     Time spent: 10 minutes  Electronically signed by HEDY Gallegos, 11/01/22, 1:44 AM EDT.    I performed an independent history and physical examination. Portions of the history were obtained by APRN and were modified by me according to my findings. The above note reflects my findings, assessment, and plan.    Hiren Hays MD    Patient seen postoperatively on mechanical ventilation.  She is unable to relay any history.  She underwent CABG x1 (LIMA to LAD) with left atrial appendage ligation, by Dr. Lane on 11/1/2022.  Currently on insulin, nitroglycerin, and Precedex.    History taken from: chart     PMH/FH/Social History were reviewed and updated appropriately in the electronic medical record.     Review of Systems:    Review of 14 systems was completed with positives and pertinent  negatives noted in the subjective section.  All other systems reviewed and are negative.         Objective     Vital Signs  Temp:  [97.7 °F (36.5 °C)-98.6 °F (37 °C)] 98 °F (36.7 °C)  Heart Rate:  [] 98  Resp:  [16-20] 16  BP: ()/(59-81) 111/76  Arterial Line BP: (100-127)/(63-76) 127/76  FiO2 (%):  [60 %-100 %] 60 %  No intake/output data recorded.  Body mass index is 39.08 kg/m².  FiO2 (%):  [60 %-100 %] 60 %  S RR:  [12-16] 16  PEEP/CPAP (cm H2O):  [5 cm H20] 5 cm H20  LA SUP:  [10 cm H20] 10 cm H20  MAP (cm H2O):  [8.4-8.5] 8.5  Physical Exam:     Constitutional:   Sedated on mechanical ventilator, in no acute distress   Head:    Normocephalic, without obvious abnormality, atraumatic   Eyes:            Lids and lashes normal   ENMT:   Ears appear intact with no abnormalities noted    Oral endotracheal tube in place   Neck:   No adenopathy, trachea midline, right internal jugular catheter in place       Lungs/Resp:    On ventilator, symmetric chest rise, no crepitus, clear to      auscultation bilaterally, left upper chest pacemaker.                Heart/CV:    Regular rhythm and normal rate, normal S1 and S2, no            murmur   Abdomen/GI:     Normal bowel sounds, no masses, no organomegaly, soft,        non-tender, non-distended   :     Deferred   Extremities/MSK:   No clubbing or cyanosis.  No edema.  Normal tone.  No deformities.    Pulses:   Pulses palpable and equal bilaterally   Skin:   No bleeding, bruising or rash   Heme/Lymph:   No cervical or supraclavicular adenopathy.    Neurologic:    Psychiatric:     Sedated.    Sedated.          Above findings represent my personal examination findings from today.  Electronically signed by:Hiren Hays MD 11/01/22 19:03 EDT     Results Review:     I reviewed the patient's new clinical results.   Results from last 7 days   Lab Units 11/01/22  1753 11/01/22  1034 11/01/22  0657 10/31/22  0613 10/30/22  0748   SODIUM mmol/L 136 137 137 134*  135*   POTASSIUM mmol/L 3.6 4.1 4.7 4.5 4.6   CHLORIDE mmol/L 102 99 99 97* 99   CO2 mmol/L 23.0 27.0 26.0 26.0 25.0   BUN mg/dL 14 13 15 11 13   CREATININE mg/dL 0.74 0.67 0.72 0.71 0.68   CALCIUM mg/dL 10.2 9.5 8.8 8.7 8.8   BILIRUBIN mg/dL  --   --  0.4 0.4 0.4   ALK PHOS U/L  --   --  87 92 90   ALT (SGPT) U/L  --   --  22 23 24   AST (SGOT) U/L  --   --  14 17 15   GLUCOSE mg/dL 179* 219* 273* 241* 254*     Results from last 7 days   Lab Units 11/01/22  1753 11/01/22  1034 11/01/22  0657   WBC 10*3/mm3 18.25* 12.50* 12.90*   HEMOGLOBIN g/dL 12.0 14.2 14.2   HEMATOCRIT % 35.7 42.3 42.5   PLATELETS 10*3/mm3 78* 134* 129*     Results from last 7 days   Lab Units 11/01/22  1750   PH, ARTERIAL pH units 7.336*   PO2 ART mm Hg 237.0*   PCO2, ARTERIAL mm Hg 46.0*   HCO3 ART mmol/L 24.6     Results from last 7 days   Lab Units 11/01/22  1753 11/01/22  0657 10/31/22  0613   MAGNESIUM mg/dL 1.8 2.0 2.0   PHOSPHORUS mg/dL 4.4  --   --      Results from last 7 days   Lab Units 10/27/22  2012   HEMOGLOBIN A1C % 11.80*         I reviewed the patient's new imaging including images and reports.  Chest x-ray 11/1/2022: Endotracheal tube in place with right internal jugular pulmonary artery catheter in place and lower lung volumes with some basilar atelectasis.    Medication Review:   acetaminophen, 650 mg, Oral, Q8H  [START ON 11/2/2022] aspirin, 325 mg, Oral, Daily  aspirin, 325 mg, Oral, Once  atorvastatin, 40 mg, Oral, Nightly  ceFAZolin, 2 g, Intravenous, Q8H  chlorhexidine, 15 mL, Mouth/Throat, Q12H  famotidine, 20 mg, Oral, BID AC  gabapentin, 100 mg, Oral, Q8H  [START ON 11/2/2022] metoprolol tartrate, 12.5 mg, Oral, Q12H  metoprolol tartrate, 2.5 mg, Intravenous, Q6H  [MAR Hold] polyethylene glycol, 17 g, Oral, BID  protamine, 50 mg, Intravenous, Once  senna-docusate sodium, 2 tablet, Oral, BID  sodium chloride, 10 mL, Intravenous, Q12H      dexmedetomidine, 0.2-1.5 mcg/kg/hr, Last Rate: 1 mcg/kg/hr (11/01/22  1842)  dextrose 5 % with KCl 20 mEq, 30 mL/hr, Last Rate: 30 mL/hr (11/01/22 1739)  DOBUTamine, 2-20 mcg/kg/min  DOPamine, 2-20 mcg/kg/min  EPINEPHrine, 0.02-0.3 mcg/kg/min  insulin, 0-100 Units/hr, Last Rate: 3.6 Units/hr (11/01/22 1828)  niCARdipine, 5-15 mg/hr  nitroglycerin, 5-200 mcg/min, Last Rate: 15 mcg/min (11/01/22 1830)  norepinephrine, 0.02-0.3 mcg/kg/min  phenylephrine, 0.5-3 mcg/kg/min  propofol, 5-50 mcg/kg/min  Scopolamine, 1 patch        Assessment & Plan       MV CAD     Tachy-gilma syndrome s/p PPM     Acquired hypothyroidism- H/O thyroid cancer s/p thryoidectomy 2012    T2DM on insulin and metformin     Class 2 obesity in adult    Constipation    Marijuana use (UDS + THC)     42 y.o. female with history of poorly controlled diabetes (hemoglobin A1c 11.8% 10/27/2022), thyroid cancer status post thyroidectomy, tachybradycardia syndrome status post permanent pacemaker, admitted to [facility] on 10/27/2022 from [facility] after NSTEMI for CABG evaluation.  She was found to have significant LAD disease with proximal disease not amenable to stent.  Urine drug screen was positive for THC.    Patient underwent CABG x1 with left atrial appendage clip by Dr. Lane on 11/1/2022.  She is seen postoperatively.    Plan:  1.  For coronary artery disease: Status post CABG x1 on 11/1/2022: Aspirin, statin, beta-blocker as tolerated.  Postoperative surgical management per CT surgery.  Cardiology following.  2.  For uncontrolled diabetes: Insulin drip protocol for now.  Hopefully can transition tomorrow if tolerating p.o. and blood sugar stable.  3.  For acquired hypothyroidism: Continue levothyroxine.  Hospitalist had reduced dose from 200 mcg 175 mcg and we will continue this with plan to follow-up with PCP in 6 weeks with repeat labs.  4.  DVT prophylaxis: Limit to mechanical only while mediastinal tubes are in place.  5.  Respiratory: Wean mechanical ventilator as tolerated.  Trial extubation  later if meets criteria.  Monitor closely postoperatively with supplemental oxygen as needed.  Early mobilization.  Encourage incentive spirometer use postextubation.  6.  We will follow while in the intensive care unit.    Level of Risk High due to: illness with threat to life or bodily function tenuous respiratory status in the setting of major chest surgery with underlying obesity and poorly controlled diabetes.    Electronically signed by:  Hiren Hays MD  11/01/22  19:14 EDT

## 2022-11-01 NOTE — PROGRESS NOTES
Cardiothoracic Surgery Progress Note      Chief complaint: Chest pain     LOS: 5 days      Subjective:  Denies chest pain or shortness of breath.    Objective:  Vital Signs  Temp:  [98.1 °F (36.7 °C)-98.9 °F (37.2 °C)] 98.6 °F (37 °C)  Heart Rate:  [64-87] 67  Resp:  [16-20] 20  BP: (113-119)/(77-80) 114/77    Physical Exam:   General Appearance: alert, appears stated age and cooperative   Lungs: clear to auscultation, respirations regular, respirations even and respirations unlabored   Heart: regular rhythm & normal rate, normal S1, S2 and no murmur, no gallop, no rub     Results:    Results from last 7 days   Lab Units 10/31/22  0613   WBC 10*3/mm3 13.48*   HEMOGLOBIN g/dL 13.9   HEMATOCRIT % 42.6   PLATELETS 10*3/mm3 136*     Results from last 7 days   Lab Units 10/31/22  0613   SODIUM mmol/L 134*   POTASSIUM mmol/L 4.5   CHLORIDE mmol/L 97*   CO2 mmol/L 26.0   BUN mg/dL 11   CREATININE mg/dL 0.71   GLUCOSE mg/dL 241*   CALCIUM mg/dL 8.7       Assessment:  Coronary artery disease  Unstable angina    Plan:  CABG today  Keep n.p.oLyndsay Uriostegui, APRN  11/01/22  07:17 EDT

## 2022-11-01 NOTE — ANESTHESIA PREPROCEDURE EVALUATION
Anesthesia Evaluation     Patient summary reviewed and Nursing notes reviewed   no history of anesthetic complications:  NPO Solid Status: > 8 hours  NPO Liquid Status: > 2 hours           Airway   Mallampati: II  TM distance: >3 FB  Neck ROM: full  No difficulty expected  Dental - normal exam     Pulmonary - normal exam   Cardiovascular - normal exam    ECG reviewed  PT is on anticoagulation therapy    (+) CAD ( LHC revealed a 98% stenosis of her LAD and an EF of 50%), CHF Systolic <55%,   (-) murmur      Neuro/Psych  (+) headaches,    GI/Hepatic/Renal/Endo    (+) morbid obesity,  liver disease, diabetes mellitus, thyroid problem (Acquired hypothyroidism- History of thyroid cancer, s/p thryoidectomy 2012) hypothyroidism    ROS Comment: Denies any esophageal pathology. Denies pain or difficulty swallowing.     Musculoskeletal     Abdominal    Substance History      OB/GYN          Other                      Anesthesia Plan    ASA 4     general, CVL and Tiger     (GEETA)  intravenous induction   Postoperative Plan: Expected vent after surgery  Anesthetic plan, risks, benefits, and alternatives have been provided, discussed and informed consent has been obtained with: patient.    Use of blood products discussed with patient  Consented to blood products.       CODE STATUS:    Level Of Support Discussed With: Patient  Code Status (Patient has no pulse and is not breathing): CPR (Attempt to Resuscitate)  Medical Interventions (Patient has pulse or is breathing): Full Support

## 2022-11-01 NOTE — ANESTHESIA PROCEDURE NOTES
Central Line      Patient reassessed immediately prior to procedure    Patient location during procedure: OR  Start time: 11/1/2022 1:48 PM  Stop Time:11/1/2022 1:53 PM  Indications: vascular access  Staff  Anesthesiologist: Pavan Lala Jr., MD  Preanesthetic Checklist  Completed: patient identified, IV checked, site marked, risks and benefits discussed, surgical consent, monitors and equipment checked, pre-op evaluation and timeout performed  Central Line Prep  Sterile Tech:cap, gloves, gown, mask and sterile barriers  Prep: chloraprep  Patient monitoring: blood pressure monitoring, continuous pulse oximetry and EKG  Central Line Procedure  Laterality:right  Location:internal jugular  Catheter Type:double lumen, MAC and Morris-Moraima  Catheter Size:9 Fr  Guidance:ultrasound guided  PROCEDURE NOTE/ULTRASOUND INTERPRETATION.  Using ultrasound guidance the potential vascular sites for insertion of the catheter were visualized to determine the patency of the vessel to be used for vascular access.  After selecting the appropriate site for insertion, the needle was visualized under ultrasound being inserted into the internal jugular vein, followed by ultrasound confirmation of wire and catheter placement. There were no abnormalities seen on ultrasound; an image was taken; and the patient tolerated the procedure with no complications. Images: still images obtained, printed/placed on chart  Assessment  Post procedure:biopatch applied, line sutured, occlusive dressing applied and secured with tape  Assessement:blood return through all ports, free fluid flow and chest x-ray ordered  Complications:no  Patient Tolerance:patient tolerated the procedure well with no apparent complications  Additional Notes  Wire confirmed in RIJ prior to dilation

## 2022-11-01 NOTE — ANESTHESIA PROCEDURE NOTES
Emergent/Open-Heart Anesthesia GEETA    Procedure Performed: Emergent/Open-Heart Anesthesia GEETA       Start Time:  11/1/2022 1:55 PM       End Time:

## 2022-11-01 NOTE — STS RISK SCORE
Procedure: Isolated CAB  Risk of Mortality:  1.135%  Renal Failure:  1.075%  Permanent Stroke:  0.592%  Prolonged Ventilation:  4.901%  DSW Infection:  0.321%  Reoperation:  1.122%  Morbidity or Mortality:  8.094%  Short Length of Stay:  58.873%  Long Length of Stay:  2.319%

## 2022-11-01 NOTE — OP NOTE
DATE OF PROCEDURE: 11/1/2022     PREOPERATIVE DIAGNOSES:  1. Coronary artery disease  2. Unstable angina  3. Diabetes mellitus  4. Tachybrady syndrome status post pacemaker insertion     POSTOPERATIVE DIAGNOSES:    1. Coronary artery disease  2. Unstable angina  3. Diabetes mellitus  4. Tachybrady syndrome status post pacemaker insertion     PROCEDURES PERFORMED:    1. Coronary artery bypass graft x 1  2. Left atrial appendage ligation (35 mm Atricure clip)     SURGEON: Bahman Lane MD       ASSISTANTS:    1. Jona Mancera MD was responsible for performing the following activities: Retraction and Suction and their skilled assistance was necessary for the success of this case.    2. Kennedy Baig PA  was responsible for performing the following activities: Retraction, Suction, Closing and Placing Dressing and their skilled assistance was necessary for the success of this case.    Circulator: Rosalind Pyle RN; Gilda Petersen RN; Barbara Little RN  Scrub Person: Cierra Dwyer; Jay Holly  Nursing Assistant: Lydia Rosado; Aurelia Jo PCT  Assistant: Kennedy Baig PA  Perfusion Tech: Carisa Gary     ANESTHESIA: General endotracheal anesthesia with Abran Lala and Patricio Casiano MD     ESTIMATED BLOOD LOSS: 400 mL     CROSSCLAMP TIME: 34 minutes       TOTAL CARDIOPULMONARY BYPASS TIME: 51 minutes      DISTAL BYPASS TARGETS:    1. LIMA to LAD    INDICATIONS:  42 year old  female with a history of thyroid cancer, diabetes mellitus, tachybrady syndrome status post pacemaker insertion and morbid obesity (BMI 39.34) who presented with chest pain.  She was found to have ostial LAD stenosis that was not ideal for PCI.  The patient was felt to be a reasonable candidate for surgical revascularization. The risks and benefits of surgery were discussed with the patient including pain, bleeding, infection, renal failure, stroke and death. The patient understood these risks and  wished to proceed with surgery.      DESCRIPTION OF PROCEDURE: The patient was taken to the operating room and placed under general endotracheal anesthesia. A central line, Bicknell-Moraima catheter, radial arterial line, and Arredondo catheter were placed. The patient was prepped and draped in the usual sterile fashion and a timeout was performed, including the patient's name, procedure, consent, beta blockade administration and antibiotics were verified.    A median sternotomy incision was made and electrocautery was utilized to gain access to the sternum. A midline sternotomy was performed after lung desufflation and hemostasis was achieved with electrocautery.    Attention was turned to the left internal mammary artery, which was taken down using electrocautery and small clips. Dissection was performed proximally to the subclavian vein and distally to the bifurcation. The bifurcation was ligated with 2 large clips to each branch and sharply divided. This revealed excellent flow within the mammary artery which was ligated distally using small clips and irrigated with papaverine solution and placed in a soaked Ray-Lawanda sponge in the left pleural space. The pericardium was opened and stay sutures were placed to create a pericardial well. Next, 3-0 Prolene sutures were placed in the ascending aorta and systemic heparin was administered. Additional cannulation sutures were placed in the right atrial appendage, ascending aorta, and right atrium. After verification of satisfactory activated clotting time, the arterial cannula was placed and connected to the cardiopulmonary bypass circuit after being de-aired. The line was tested and a wrap was performed. The venous cannula was inserted followed by antegrade and retrograde cardioplegia lines. It should be noted that the right atrial pacing lead was present at the tip of the right atrial appendage.  A slit within the pericardial well along the cephalad portion was created for  appropriate transition of the mammary pedicle into the mediastinum. Cardiopulmonary bypass was initiated and the patient was allowed to drift in temperature and distal bypass target verified. Bypass flow was dropped and the aortic crossclamp was applied. Cardioplegia was administered in an antegrade fashion with immediate cessation of cardiac activity. There was an excellent septal temperature response. The root vent suction was turned on high and the left coronary artery distribution was thoroughly inspected.  The proximal diagonal branch was small in caliber measuring under 1 mm and thin walled.   The left atrial appendage was sized and a 35 mm Atricure clip was placed at the base of the appendage.    The LAD was inspected and found to have proximal disease on palpation. An arteriotomy was made on the mid LAD and extended proximally and distally on this 2,25 mm diameter vessel. The internal mammary artery was then prepared for grafting by ligating the 2 venous branches along the pedicle using small clips. The mammary artery was trimmed proximal to the bifurcation and beveled for grafting. An end-to-side anastomosis with an 8-0 Prolene suture was constructed and tied down. The bulldog clamp was removed and there was excellent flow within the vessel and adequate filling of the LAD. The underlying mammary fascia was secured to the epicardium using two 6-0 Prolene sutures.  A hot shot was given down the ascending aorta. The patient was placed in steep Trendelenburg position. The root vent was turned on high suction and cardiopulmonary bypass flow was turned down with crossclamp subsequently removed. Bypass flows were returned to normal. The heart returned to spontaneous sinus rhythm. Theanastomosis was inspected and found to be hemostatic.   The patient was subsequently weaned from cardiopulmonary bypass and decannulation was successfully carried out. All cannulation sites were reinforced with additional 4-0 Prolene  suture and inspected for hemostasis. Doppler examination of bypass graft revealed excellent flow within the mammary.  Ventricular pacing wires were placed and secured using 0 silk suture. Three chest tubes were then placed within the left and right pleural spaces and mediastinum. These were secured using a 0 Ethibond suture. The sternum was reapproximated with #7 stainless steel wire and the linea alba was closed with a running 0 Vicryl suture. Subcutaneous tissues were closed with a 2-0 Vicryl suture and the inferior aspect of the incision was closed with additional interrupted 2-0 Vicryl sutures in the dermal layer. The skin was reapproximated with a 4-0 Monocryl subcuticular stitch and overlying skin glue was applied to the incision. Gauze and tape were applied to the chest tube sites and the patient was subsequently transported to the cardiac ICU in stable condition, intubated.

## 2022-11-02 ENCOUNTER — APPOINTMENT (OUTPATIENT)
Dept: GENERAL RADIOLOGY | Facility: HOSPITAL | Age: 42
End: 2022-11-02

## 2022-11-02 LAB
ALBUMIN SERPL-MCNC: 4.6 G/DL (ref 3.5–5.2)
ALBUMIN/GLOB SERPL: 2 G/DL
ALP SERPL-CCNC: 64 U/L (ref 39–117)
ALT SERPL W P-5'-P-CCNC: 19 U/L (ref 1–33)
ANION GAP SERPL CALCULATED.3IONS-SCNC: 14 MMOL/L (ref 5–15)
AST SERPL-CCNC: 31 U/L (ref 1–32)
BASOPHILS # BLD AUTO: 0.17 10*3/MM3 (ref 0–0.2)
BASOPHILS NFR BLD AUTO: 0.6 % (ref 0–1.5)
BH CV ECHO MEAS - AO MAX PG: 5.9 MMHG
BH CV ECHO MEAS - AO MEAN PG: 3.6 MMHG
BH CV ECHO MEAS - AO ROOT DIAM: 2.8 CM
BH CV ECHO MEAS - AO V2 MAX: 121 CM/SEC
BH CV ECHO MEAS - AO V2 VTI: 24 CM
BH CV ECHO MEAS - AVA(I,D): 2.9 CM2
BH CV ECHO MEAS - EDV(CUBED): 91.1 ML
BH CV ECHO MEAS - EDV(MOD-SP2): 93.6 ML
BH CV ECHO MEAS - EDV(MOD-SP4): 127 ML
BH CV ECHO MEAS - EF(MOD-BP): 63.7 %
BH CV ECHO MEAS - EF(MOD-SP2): 62.6 %
BH CV ECHO MEAS - EF(MOD-SP4): 63.5 %
BH CV ECHO MEAS - ESV(CUBED): 22 ML
BH CV ECHO MEAS - ESV(MOD-SP2): 35 ML
BH CV ECHO MEAS - ESV(MOD-SP4): 46.4 ML
BH CV ECHO MEAS - FS: 37.8 %
BH CV ECHO MEAS - IVS/LVPW: 1 CM
BH CV ECHO MEAS - IVSD: 0.9 CM
BH CV ECHO MEAS - LA DIMENSION: 3.9 CM
BH CV ECHO MEAS - LAT PEAK E' VEL: 15.2 CM/SEC
BH CV ECHO MEAS - LV MASS(C)D: 132.8 GRAMS
BH CV ECHO MEAS - LV MAX PG: 4.8 MMHG
BH CV ECHO MEAS - LV MEAN PG: 3 MMHG
BH CV ECHO MEAS - LV V1 MAX: 109 CM/SEC
BH CV ECHO MEAS - LV V1 VTI: 22 CM
BH CV ECHO MEAS - LVIDD: 4.5 CM
BH CV ECHO MEAS - LVIDS: 2.8 CM
BH CV ECHO MEAS - LVOT AREA: 3.1 CM2
BH CV ECHO MEAS - LVOT DIAM: 2 CM
BH CV ECHO MEAS - LVPWD: 0.9 CM
BH CV ECHO MEAS - MED PEAK E' VEL: 8.6 CM/SEC
BH CV ECHO MEAS - MV A MAX VEL: 63.7 CM/SEC
BH CV ECHO MEAS - MV DEC SLOPE: 465 CM/SEC2
BH CV ECHO MEAS - MV DEC TIME: 0.18 MSEC
BH CV ECHO MEAS - MV E MAX VEL: 89.2 CM/SEC
BH CV ECHO MEAS - MV E/A: 1.4
BH CV ECHO MEAS - MV MAX PG: 3.6 MMHG
BH CV ECHO MEAS - MV MEAN PG: 2 MMHG
BH CV ECHO MEAS - MV P1/2T: 59.8 MSEC
BH CV ECHO MEAS - MV V2 VTI: 19.7 CM
BH CV ECHO MEAS - MVA(P1/2T): 3.7 CM2
BH CV ECHO MEAS - MVA(VTI): 3.5 CM2
BH CV ECHO MEAS - PA ACC TIME: 0.13 SEC
BH CV ECHO MEAS - PA PR(ACCEL): 18.7 MMHG
BH CV ECHO MEAS - PA V2 MAX: 93 CM/SEC
BH CV ECHO MEAS - RAP SYSTOLE: 8 MMHG
BH CV ECHO MEAS - RVSP: 33 MMHG
BH CV ECHO MEAS - SV(LVOT): 69.1 ML
BH CV ECHO MEAS - SV(MOD-SP2): 58.6 ML
BH CV ECHO MEAS - SV(MOD-SP4): 80.6 ML
BH CV ECHO MEAS - TAPSE (>1.6): 2.19 CM
BH CV ECHO MEAS - TR MAX PG: 25.7 MMHG
BH CV ECHO MEAS - TR MAX VEL: 253.3 CM/SEC
BH CV ECHO MEASUREMENTS AVERAGE E/E' RATIO: 7.5
BH CV VAS BP LEFT ARM: NORMAL MMHG
BH CV XLRA - RV BASE: 3.9 CM
BH CV XLRA - RV LENGTH: 7.3 CM
BH CV XLRA - RV MID: 3.2 CM
BH CV XLRA - TDI S': 12.2 CM/SEC
BILIRUB SERPL-MCNC: 0.4 MG/DL (ref 0–1.2)
BUN SERPL-MCNC: 13 MG/DL (ref 6–20)
BUN/CREAT SERPL: 18.1 (ref 7–25)
CALCIUM SPEC-SCNC: 9.3 MG/DL (ref 8.6–10.5)
CHLORIDE SERPL-SCNC: 103 MMOL/L (ref 98–107)
CO2 SERPL-SCNC: 20 MMOL/L (ref 22–29)
CREAT SERPL-MCNC: 0.72 MG/DL (ref 0.57–1)
DEPRECATED RDW RBC AUTO: 42.1 FL (ref 37–54)
EGFRCR SERPLBLD CKD-EPI 2021: 107.2 ML/MIN/1.73
EOSINOPHIL # BLD AUTO: 0.07 10*3/MM3 (ref 0–0.4)
EOSINOPHIL NFR BLD AUTO: 0.3 % (ref 0.3–6.2)
ERYTHROCYTE [DISTWIDTH] IN BLOOD BY AUTOMATED COUNT: 13.5 % (ref 12.3–15.4)
GLOBULIN UR ELPH-MCNC: 2.3 GM/DL
GLUCOSE BLDC GLUCOMTR-MCNC: 119 MG/DL (ref 70–130)
GLUCOSE BLDC GLUCOMTR-MCNC: 128 MG/DL (ref 70–130)
GLUCOSE BLDC GLUCOMTR-MCNC: 135 MG/DL (ref 70–130)
GLUCOSE BLDC GLUCOMTR-MCNC: 143 MG/DL (ref 70–130)
GLUCOSE BLDC GLUCOMTR-MCNC: 151 MG/DL (ref 70–130)
GLUCOSE BLDC GLUCOMTR-MCNC: 153 MG/DL (ref 70–130)
GLUCOSE BLDC GLUCOMTR-MCNC: 156 MG/DL (ref 70–130)
GLUCOSE BLDC GLUCOMTR-MCNC: 166 MG/DL (ref 70–130)
GLUCOSE BLDC GLUCOMTR-MCNC: 167 MG/DL (ref 70–130)
GLUCOSE BLDC GLUCOMTR-MCNC: 168 MG/DL (ref 70–130)
GLUCOSE BLDC GLUCOMTR-MCNC: 169 MG/DL (ref 70–130)
GLUCOSE BLDC GLUCOMTR-MCNC: 172 MG/DL (ref 70–130)
GLUCOSE BLDC GLUCOMTR-MCNC: 174 MG/DL (ref 70–130)
GLUCOSE BLDC GLUCOMTR-MCNC: 176 MG/DL (ref 70–130)
GLUCOSE BLDC GLUCOMTR-MCNC: 182 MG/DL (ref 70–130)
GLUCOSE BLDC GLUCOMTR-MCNC: 187 MG/DL (ref 70–130)
GLUCOSE BLDC GLUCOMTR-MCNC: 188 MG/DL (ref 70–130)
GLUCOSE BLDC GLUCOMTR-MCNC: 189 MG/DL (ref 70–130)
GLUCOSE BLDC GLUCOMTR-MCNC: 191 MG/DL (ref 70–130)
GLUCOSE BLDC GLUCOMTR-MCNC: 191 MG/DL (ref 70–130)
GLUCOSE BLDC GLUCOMTR-MCNC: 193 MG/DL (ref 70–130)
GLUCOSE SERPL-MCNC: 191 MG/DL (ref 65–99)
HCT VFR BLD AUTO: 37.2 % (ref 34–46.6)
HGB BLD-MCNC: 12.2 G/DL (ref 12–15.9)
IMM GRANULOCYTES # BLD AUTO: 0.47 10*3/MM3 (ref 0–0.05)
IMM GRANULOCYTES NFR BLD AUTO: 1.8 % (ref 0–0.5)
INR PPP: 1.1 (ref 0.84–1.13)
LEFT ATRIUM VOLUME INDEX: 20.1 ML/M2
LYMPHOCYTES # BLD AUTO: 1.68 10*3/MM3 (ref 0.7–3.1)
LYMPHOCYTES NFR BLD AUTO: 6.4 % (ref 19.6–45.3)
MAGNESIUM SERPL-MCNC: 2.4 MG/DL (ref 1.6–2.6)
MAXIMAL PREDICTED HEART RATE: 178 BPM
MCH RBC QN AUTO: 28.1 PG (ref 26.6–33)
MCHC RBC AUTO-ENTMCNC: 32.8 G/DL (ref 31.5–35.7)
MCV RBC AUTO: 85.7 FL (ref 79–97)
MONOCYTES # BLD AUTO: 1.54 10*3/MM3 (ref 0.1–0.9)
MONOCYTES NFR BLD AUTO: 5.8 % (ref 5–12)
NEUTROPHILS NFR BLD AUTO: 22.48 10*3/MM3 (ref 1.7–7)
NEUTROPHILS NFR BLD AUTO: 85.1 % (ref 42.7–76)
NRBC BLD AUTO-RTO: 0 /100 WBC (ref 0–0.2)
PHOSPHATE SERPL-MCNC: 6.1 MG/DL (ref 2.5–4.5)
PLATELET # BLD AUTO: 137 10*3/MM3 (ref 140–450)
PMV BLD AUTO: 13.2 FL (ref 6–12)
POTASSIUM SERPL-SCNC: 4.8 MMOL/L (ref 3.5–5.2)
PROT SERPL-MCNC: 6.9 G/DL (ref 6–8.5)
PROTHROMBIN TIME: 14.1 SECONDS (ref 11.4–14.4)
QT INTERVAL: 344 MS
QT INTERVAL: 384 MS
QTC INTERVAL: 437 MS
QTC INTERVAL: 439 MS
RBC # BLD AUTO: 4.34 10*6/MM3 (ref 3.77–5.28)
SODIUM SERPL-SCNC: 137 MMOL/L (ref 136–145)
STRESS TARGET HR: 151 BPM
WBC NRBC COR # BLD: 26.41 10*3/MM3 (ref 3.4–10.8)

## 2022-11-02 PROCEDURE — 84100 ASSAY OF PHOSPHORUS: CPT | Performed by: THORACIC SURGERY (CARDIOTHORACIC VASCULAR SURGERY)

## 2022-11-02 PROCEDURE — 25010000002 MORPHINE PER 10 MG: Performed by: THORACIC SURGERY (CARDIOTHORACIC VASCULAR SURGERY)

## 2022-11-02 PROCEDURE — 82962 GLUCOSE BLOOD TEST: CPT

## 2022-11-02 PROCEDURE — 25010000002 CEFAZOLIN IN DEXTROSE 2-4 GM/100ML-% SOLUTION: Performed by: PHYSICIAN ASSISTANT

## 2022-11-02 PROCEDURE — 85610 PROTHROMBIN TIME: CPT | Performed by: PHYSICIAN ASSISTANT

## 2022-11-02 PROCEDURE — 99231 SBSQ HOSP IP/OBS SF/LOW 25: CPT | Performed by: INTERNAL MEDICINE

## 2022-11-02 PROCEDURE — 71045 X-RAY EXAM CHEST 1 VIEW: CPT

## 2022-11-02 PROCEDURE — 93010 ELECTROCARDIOGRAM REPORT: CPT | Performed by: INTERNAL MEDICINE

## 2022-11-02 PROCEDURE — 85025 COMPLETE CBC W/AUTO DIFF WBC: CPT | Performed by: PHYSICIAN ASSISTANT

## 2022-11-02 PROCEDURE — 93005 ELECTROCARDIOGRAM TRACING: CPT | Performed by: PHYSICIAN ASSISTANT

## 2022-11-02 PROCEDURE — 80053 COMPREHEN METABOLIC PANEL: CPT | Performed by: INTERNAL MEDICINE

## 2022-11-02 PROCEDURE — 99233 SBSQ HOSP IP/OBS HIGH 50: CPT | Performed by: INTERNAL MEDICINE

## 2022-11-02 PROCEDURE — 25010000002 CEFAZOLIN PER 500 MG

## 2022-11-02 PROCEDURE — 83735 ASSAY OF MAGNESIUM: CPT | Performed by: PHYSICIAN ASSISTANT

## 2022-11-02 PROCEDURE — 97162 PT EVAL MOD COMPLEX 30 MIN: CPT

## 2022-11-02 RX ORDER — CEFAZOLIN SODIUM IN 0.9 % NACL 2 G/100 ML
2 PLASTIC BAG, INJECTION (ML) INTRAVENOUS EVERY 8 HOURS
Status: COMPLETED | OUTPATIENT
Start: 2022-11-02 | End: 2022-11-03

## 2022-11-02 RX ADMIN — OXYCODONE 10 MG: 5 TABLET ORAL at 15:20

## 2022-11-02 RX ADMIN — HYDROCODONE BITARTRATE AND ACETAMINOPHEN 1 TABLET: 7.5; 325 TABLET ORAL at 22:34

## 2022-11-02 RX ADMIN — FAMOTIDINE 20 MG: 20 TABLET ORAL at 06:31

## 2022-11-02 RX ADMIN — INSULIN HUMAN 15.6 UNITS/HR: 1 INJECTION, SOLUTION INTRAVENOUS at 07:55

## 2022-11-02 RX ADMIN — Medication 10 ML: at 20:32

## 2022-11-02 RX ADMIN — CEFAZOLIN SODIUM 2 G: 2 INJECTION, SOLUTION INTRAVENOUS at 06:28

## 2022-11-02 RX ADMIN — SENNOSIDES AND DOCUSATE SODIUM 2 TABLET: 50; 8.6 TABLET ORAL at 08:50

## 2022-11-02 RX ADMIN — LEVOTHYROXINE SODIUM 175 MCG: 150 TABLET ORAL at 06:28

## 2022-11-02 RX ADMIN — FAMOTIDINE 20 MG: 20 TABLET ORAL at 16:40

## 2022-11-02 RX ADMIN — HYDROCODONE BITARTRATE AND ACETAMINOPHEN 1 TABLET: 7.5; 325 TABLET ORAL at 04:14

## 2022-11-02 RX ADMIN — GABAPENTIN 100 MG: 100 CAPSULE ORAL at 11:36

## 2022-11-02 RX ADMIN — OXYCODONE 5 MG: 5 TABLET ORAL at 01:49

## 2022-11-02 RX ADMIN — OXYCODONE 10 MG: 5 TABLET ORAL at 10:53

## 2022-11-02 RX ADMIN — MORPHINE SULFATE 2 MG: 2 INJECTION, SOLUTION INTRAMUSCULAR; INTRAVENOUS at 04:14

## 2022-11-02 RX ADMIN — INSULIN HUMAN 10.1 UNITS/HR: 1 INJECTION, SOLUTION INTRAVENOUS at 22:54

## 2022-11-02 RX ADMIN — OXYCODONE 10 MG: 5 TABLET ORAL at 06:28

## 2022-11-02 RX ADMIN — OXYCODONE 10 MG: 5 TABLET ORAL at 20:31

## 2022-11-02 RX ADMIN — ASPIRIN 325 MG: 325 TABLET, COATED ORAL at 08:50

## 2022-11-02 RX ADMIN — GABAPENTIN 100 MG: 100 CAPSULE ORAL at 20:31

## 2022-11-02 RX ADMIN — METOPROLOL TARTRATE 12.5 MG: 25 TABLET, FILM COATED ORAL at 20:31

## 2022-11-02 RX ADMIN — MORPHINE SULFATE 2 MG: 2 INJECTION, SOLUTION INTRAMUSCULAR; INTRAVENOUS at 11:37

## 2022-11-02 RX ADMIN — ACETAMINOPHEN 650 MG: 325 TABLET, FILM COATED ORAL at 01:49

## 2022-11-02 RX ADMIN — MORPHINE SULFATE 2 MG: 2 INJECTION, SOLUTION INTRAMUSCULAR; INTRAVENOUS at 08:50

## 2022-11-02 RX ADMIN — CEFAZOLIN 2 G: 10 INJECTION, POWDER, FOR SOLUTION INTRAVENOUS at 13:34

## 2022-11-02 RX ADMIN — CEFAZOLIN 2 G: 10 INJECTION, POWDER, FOR SOLUTION INTRAVENOUS at 22:54

## 2022-11-02 RX ADMIN — ATORVASTATIN CALCIUM 40 MG: 40 TABLET, FILM COATED ORAL at 20:31

## 2022-11-02 RX ADMIN — MORPHINE SULFATE 2 MG: 2 INJECTION, SOLUTION INTRAMUSCULAR; INTRAVENOUS at 16:40

## 2022-11-02 RX ADMIN — HYDROCODONE BITARTRATE AND ACETAMINOPHEN 1 TABLET: 7.5; 325 TABLET ORAL at 18:24

## 2022-11-02 RX ADMIN — HYDROCODONE BITARTRATE AND ACETAMINOPHEN 1 TABLET: 7.5; 325 TABLET ORAL at 08:50

## 2022-11-02 RX ADMIN — HYDROCODONE BITARTRATE AND ACETAMINOPHEN 1 TABLET: 7.5; 325 TABLET ORAL at 13:34

## 2022-11-02 NOTE — THERAPY EVALUATION
Patient Name: Tawanna Huang  : 1980    MRN: [id number]                              Today's Date: 2022       Admit Date: 10/27/2022    Visit Dx:     ICD-10-CM ICD-9-CM   1. Coronary artery disease involving native coronary artery of native heart with unstable angina pectoris (HCC)  I25.110 414.01     411.1   2. Encounter for examination for normal comparison and control in clinical research program  Z00.6 V70.7     Patient Active Problem List   Diagnosis   • Tachy-gilma syndrome s/p PPM    • Enlarged liver   • Acquired hypothyroidism- H/O thyroid cancer s/p thryoidectomy    • T2DM on insulin and metformin    • Class 2 obesity in adult   • Chronic nonintractable headache   • Irregular menses   • Fatty liver   • Vitamin D deficiency   • MV CAD    • Constipation   • Marijuana use (UDS + THC)      Past Medical History:   Diagnosis Date   • Enlarged liver    • Irregular heart beat    • Pacemaker    • Thyroid cancer (HCC)     papillary s/p thyroidectomy and radioactive iodine   • Type 2 diabetes mellitus (HCC)      Past Surgical History:   Procedure Laterality Date   • CORONARY ARTERY BYPASS GRAFT N/A 2022    Procedure: MEDIAN STERNOTOMY, CORONARY ARTERY BYPASS UTILIZING THE LEFT INTERNAL MAMMARY ARTERY GRAFT, LEFT ATRIAL APPENDAGE LIGATION, GEETA;  Surgeon: Bahman Lane MD;  Location: [facility];  Service: Cardiothoracic;  Laterality: N/A;   • PACEMAKER IMPLANTATION     • THYROIDECTOMY  2012      General Information     Row Name 22 1555          Physical Therapy Time and Intention    Document Type evaluation  -KG     Mode of Treatment physical therapy  -KG     Row Name 22 1555          General Information    Patient Profile Reviewed yes  -KG     Prior Level of Function independent:;all household mobility;gait;transfer;ADL's;dressing;bathing  -KG     Existing Precautions/Restrictions cardiac;fall;oxygen therapy device and L/min;sternal  -KG     Barriers to Rehab none  identified  -KG     Row Name 11/02/22 1555          Living Environment    People in Home significant other  -KG     Row Name 11/02/22 1555          Home Main Entrance    Number of Stairs, Main Entrance five  -KG     Stair Railings, Main Entrance none  -KG     Row Name 11/02/22 1555          Stairs Within Home, Primary    Number of Stairs, Within Home, Primary none  -KG     Row Name 11/02/22 1555          Cognition    Orientation Status (Cognition) oriented x 3  -KG     Row Name 11/02/22 1555          Safety Issues, Functional Mobility    Safety Issues Affecting Function (Mobility) awareness of need for assistance;insight into deficits/self-awareness;safety precaution awareness;safety precautions follow-through/compliance  -KG     Impairments Affecting Function (Mobility) balance;coordination;endurance/activity tolerance;postural/trunk control;pain;shortness of breath;strength  -KG           User Key  (r) = Recorded By, (t) = Taken By, (c) = Cosigned By    Initials Name Provider Type    KG Christiana Rueda, PT Physical Therapist               Mobility     Row Name 11/02/22 1557          Bed Mobility    Comment, (Bed Mobility) UIC  -KG     Row Name 11/02/22 1557          Transfers    Comment, (Transfers) VC's for sequencing and safe hand placement to maintain sternal precautions. Pt denied any c/o dizziness upon standing.  -KG     Row Name 11/02/22 1557          Sit-Stand Transfer    Sit-Stand [place] (Transfers) minimum assist (75% patient effort);2 person assist;verbal cues  -KG     Row Name 11/02/22 1557          Gait/Stairs (Locomotion)    [place] Level (Gait) minimum assist (75% patient effort);2 person assist;verbal cues  -KG     Assistive Device (Gait) other (see comments)  B UE support on tele monitor  -KG     Distance in Feet (Gait) 110  -KG     Deviations/Abnormal Patterns (Gait) sheridan decreased;stride length decreased  -KG     Bilateral Gait Deviations forward flexed posture  -KG      Comment, (Gait/Stairs) Pt demonstrated step through gait pattern with slow sherdian and decreased step length. Pt demonstrated good stability with no LOB. Required one standing rest break. Ambulation distance limited by c/o increased incisional pain.  -KG           User Key  (r) = Recorded By, (t) = Taken By, (c) = Cosigned By    Initials Name Provider Type    KG Christiana Rueda PT Physical Therapist               Obj/Interventions     Row Name 11/02/22 1559          Range of Motion Comprehensive    General Range of Motion no range of motion deficits identified  -KG     Comment, General Range of Motion B LE WFL  -KG     Row Name 11/02/22 1559          Strength Comprehensive (MMT)    Comment, General Manual Muscle Testing (MMT) Assessment B LE grossly 3+/5  -KG     Row Name 11/02/22 1559          Balance    Balance Assessment sitting static balance;standing static balance;standing dynamic balance  -KG     Static Sitting Balance independent  -KG     Position, Sitting Balance unsupported;sitting in chair  -KG     Static Standing Balance contact guard  -KG     Dynamic Standing Balance minimal assist  -KG     Position/Device Used, Standing Balance supported  -KG     Row Name 11/02/22 1559          Sensory Assessment (Somatosensory)    Sensory Assessment (Somatosensory) LE sensation intact  -KG           User Key  (r) = Recorded By, (t) = Taken By, (c) = Cosigned By    Initials Name Provider Type    KG Christiana Rueda PT Physical Therapist               Goals/Plan     Row Name 11/02/22 1601          Bed Mobility Goal 1 (PT)    Activity/Assistive Device (Bed Mobility Goal 1, PT) sit to supine;supine to sit  -KG     [place] Level/Cues Needed (Bed Mobility Goal 1, PT) independent  -KG     Time Frame (Bed Mobility Goal 1, PT) 2 weeks  -KG     Progress/Outcomes (Bed Mobility Goal 1, PT) goal ongoing  -KG     Row Name 11/02/22 1601          Transfer Goal 1 (PT)    Activity/Assistive Device (Transfer Goal 1,  PT) sit-to-stand/stand-to-sit;bed-to-chair/chair-to-bed  -KG     [place] Level/Cues Needed (Transfer Goal 1, PT) independent  -KG     Time Frame (Transfer Goal 1, PT) 2 weeks  -KG     Progress/Outcome (Transfer Goal 1, PT) goal ongoing  -KG     Row Name 11/02/22 1601          Gait Training Goal 1 (PT)    Activity/Assistive Device (Gait Training Goal 1, PT) gait (walking locomotion)  -KG     [place] Level (Gait Training Goal 1, PT) independent  -KG     Distance (Gait Training Goal 1, PT) 400 feet  -KG     Time Frame (Gait Training Goal 1, PT) 2 weeks  -KG     Progress/Outcome (Gait Training Goal 1, PT) goal ongoing  -KG     Row Name 11/02/22 1601          Stairs Goal 1 (PT)    Activity/Assistive Device (Stairs Goal 1, PT) ascending stairs;descending stairs;step-to-step  -KG     [place] Level/Cues Needed (Stairs Goal 1, PT) supervision required  -KG     Number of Stairs (Stairs Goal 1, PT) 5  -KG     Time Frame (Stairs Goal 1, PT) 2 weeks  -KG     Progress/Outcome (Stairs Goal 1, PT) goal ongoing  -KG     Row Name 11/02/22 1601          Therapy Assessment/Plan (PT)    Planned Therapy Interventions (PT) balance training;bed mobility training;gait training;stair training;strengthening;transfer training  -KG           User Key  (r) = Recorded By, (t) = Taken By, (c) = Cosigned By    Initials Name Provider Type    KG Christiana Rueda, PT Physical Therapist               Clinical Impression     Row Name 11/02/22 1600          Pain    Pretreatment Pain Rating 5/10  -KG     Posttreatment Pain Rating 8/10  -KG     Pain Location incisional  -KG     Pain Location - chest  -KG     Pain Intervention(s) Repositioned;Ambulation/increased activity  -KG     Row Name 11/02/22 1600          Plan of Care Review    Plan of Care Reviewed With patient  -KG     Outcome Evaluation PT initial evaluation completed for pt s/p CABG x1 presenting with generalized weakness, incisional pain, and decreased functional mobility. Pt  ambulated 110ft with Rene x2 and B UE support. Pt's decreased independence warrants PT skilled care. Recommend D/C home with assistance.  -KG     Row Name 11/02/22 1600          Therapy Assessment/Plan (PT)    Patient/Family Therapy Goals Statement (PT) return to PLOF  -KG     Rehab Potential (PT) good, to achieve stated therapy goals  -KG     Criteria for Skilled Interventions Met (PT) yes;skilled treatment is necessary  -KG     Therapy Frequency (PT) daily  -KG     Row Name 11/02/22 1600          Vital Signs    Pre Systolic BP Rehab 103  -KG     Pre Treatment Diastolic BP 67  -KG     Post Systolic BP Rehab 123  -KG     Post Treatment Diastolic BP 69  -KG     Pretreatment Heart Rate (beats/min) 80  -KG     Posttreatment Heart Rate (beats/min) 85  -KG     Pre SpO2 (%) 93  -KG     O2 Delivery Pre Treatment supplemental O2  -KG     Post SpO2 (%) 94  -KG     O2 Delivery Post Treatment supplemental O2  -KG     Pre Patient Position Sitting  -KG     Intra Patient Position Standing  -KG     Post Patient Position Sitting  -KG     Row Name 11/02/22 1600          Positioning and Restraints    Pre-Treatment Position sitting in chair/recliner  -KG     Post Treatment Position chair  -KG     In Chair reclined;call light within reach;encouraged to call for assist;with family/caregiver;with nsg;RUE elevated;LUE elevated;legs elevated  -KG           User Key  (r) = Recorded By, (t) = Taken By, (c) = Cosigned By    Initials Name Provider Type    KG Christiana Rueda, PT Physical Therapist               Outcome Measures     Row Name 11/02/22 1602 11/02/22 0800       How much help from another person do you currently need...    Turning from your back to your side while in flat bed without using bedrails? 2  -KG 2  -SW    Moving from lying on back to sitting on the side of a flat bed without bedrails? 2  -KG 2  -SW    Moving to and from a bed to a chair (including a wheelchair)? 3  -KG 2  -SW    Standing up from a chair using your  arms (e.g., wheelchair, bedside chair)? 3  -KG 2  -SW    Climbing 3-5 steps with a railing? 2  -KG 2  -SW    To walk in hospital room? 3  -KG 2  -SW    AM-PAC 6 Clicks Score (PT) 15  -KG 12  -SW    Highest level of mobility 4 --> Transferred to chair/commode  -KG 4 --> Transferred to chair/commode  -SW    Row Name 11/02/22 1602          Functional Assessment    Outcome Measure Options AM-PAC 6 Clicks Basic Mobility (PT)  -KG           User Key  (r) = Recorded By, (t) = Taken By, (c) = Cosigned By    Initials Name Provider Type    SW Brett Earl RN Registered Nurse    KG Christiana Rueda, PT Physical Therapist                             Physical Therapy Education     Title: PT OT SLP Therapies (In Progress)     Topic: Physical Therapy (In Progress)     Point: Mobility training (In Progress)     Learning Progress Summary           Patient Acceptance, E, NR by KG at 11/2/2022 1113                   Point: Home exercise program (Not Started)     Learner Progress:  Not documented in this visit.          Point: Body mechanics (In Progress)     Learning Progress Summary           Patient Acceptance, E, NR by KG at 11/2/2022 1113                   Point: Precautions (In Progress)     Learning Progress Summary           Patient Acceptance, E, NR by KG at 11/2/2022 1113                               User Key     Initials Effective Dates Name Provider Type Discipline    KG 05/22/20 -  Christiana Rueda, PT Physical Therapist PT              PT Recommendation and Plan  Planned Therapy Interventions (PT): balance training, bed mobility training, gait training, stair training, strengthening, transfer training  Plan of Care Reviewed With: patient  Outcome Evaluation: PT initial evaluation completed for pt s/p CABG x1 presenting with generalized weakness, incisional pain, and decreased functional mobility. Pt ambulated 110ft with Rene x2 and B UE support. Pt's decreased independence warrants PT skilled care.  Recommend D/C home with assistance.     Time Calculation:    PT Charges     Row Name 11/02/22 1113             Time Calculation    Start Time 1113  -KG      PT Received On 11/02/22  -KG      PT Goal Re-Cert Due Date 11/12/22  -KG         Untimed Charges    PT Eval/Re-eval Minutes 48  -KG         Total Minutes    Untimed Charges Total Minutes 48  -KG       Total Minutes 48  -KG            User Key  (r) = Recorded By, (t) = Taken By, (c) = Cosigned By    Initials Name Provider Type    KG Christiana Rueda, PT Physical Therapist              Therapy Charges for Today     Code Description Service Date Service Provider Modifiers Qty    [id number] HC PT EVAL MOD COMPLEXITY 4 11/2/2022 Christiana Rueda, PT GP 1          PT G-Codes  Outcome Measure Options: AM-PAC 6 Clicks Basic Mobility (PT)  AM-PAC 6 Clicks Score (PT): 15    Mag Rueda PT  11/2/2022

## 2022-11-02 NOTE — PROGRESS NOTES
"[facility] at [facility]  IP Progress Note    PROBLEM LIST:    CARDIAC:  a.  Coronary artery disease:  1.  Unstable angina, [facility], October 2022: Left main normal, ostial LAD 99% stenosis, rest of the arteries are normal  2. CABG x 1, 11/1/22: LIMA-LAD     b.  Myocardium:  1.  Echo, October 2022: LVEF normal  2. Echo,LVEF 60% with apical akinesia     c.  Valvular:  1.  No significant valvular disease    d.  Electrical:  1.  History of sick sinus syndrome s/p pacemaker placement at age 28  2.  TIMA closure during CABG    e.  Percardium:  1.  Normal pericardium    CARDIAC RISK FACTORS:  1.  Type 2 diabetes  2.  Hyperlipidemia  3.  Family history of coronary artery disease  4.  Morbid obesity       NON CARDIAC:  1.  Thyroid cancer s/p thyroidectomy  2.  Fatty liver  3.  Vitamin D deficiency  4.  Headaches    SURGERIES:  1.  Thyroidectomy        HOSPITAL COURSE:    Patient was transferred for non-ST segment elevation MI without ostial disease to the LAD.  Patient has undergone successful revascularization.      Subjective   Patient sitting in the chair at present time with no significant complaints          Objective     Blood pressure 96/62, pulse 77, temperature 97.9 °F (36.6 °C), temperature source Bladder, resp. rate (!) 29, height 162.6 cm (64.02\"), weight 103 kg (227 lb 12.8 oz), SpO2 94 %, not currently breastfeeding.     Intake/Output Summary (Last 24 hours) at 11/2/2022 1555  Last data filed at 11/2/2022 1400  Gross per 24 hour   Intake 3082.5 ml   Output 2810 ml   Net 272.5 ml       PHYSICAL EXAM:  General: No acute distress.   Neck: no JVD.  Chest:No respiratory distress, breath sounds are normal. No wheezes,  rhonchi or rales.  Cardiovascular: Normal S1 and S2, no murmer, gallop or rub.    Extremities: No edema.    RESULR REVIEW:    I reviewed the patient's new clinical results.      MEDICATIONS:    acetaminophen, 650 mg, Oral, Q8H  aspirin, 325 mg, Oral, Daily  atorvastatin, 40 mg, Oral, " Nightly  ceFAZolin, 2 g, Intravenous, Q8H  famotidine, 20 mg, Oral, BID AC  gabapentin, 100 mg, Oral, Q8H  levothyroxine, 175 mcg, Oral, Q AM  metoprolol tartrate, 12.5 mg, Oral, Q12H  metoprolol tartrate, 2.5 mg, Intravenous, Q6H  pharmacy consult - MTM, , Does not apply, Daily  senna-docusate sodium, 2 tablet, Oral, BID  sodium chloride, 10 mL, Intravenous, Q12H          Results from last 7 days   Lab Units 11/02/22  0235   WBC 10*3/mm3 26.41*   HEMOGLOBIN g/dL 12.2   HEMATOCRIT % 37.2   PLATELETS 10*3/mm3 137*     Results from last 7 days   Lab Units 11/02/22  0235   SODIUM mmol/L 137   POTASSIUM mmol/L 4.8   CHLORIDE mmol/L 103   CO2 mmol/L 20.0*   BUN mg/dL 13   CREATININE mg/dL 0.72   CALCIUM mg/dL 9.3   BILIRUBIN mg/dL 0.4   ALK PHOS U/L 64   ALT (SGPT) U/L 19   AST (SGOT) U/L 31   GLUCOSE mg/dL 191*     Results from last 7 days   Lab Units 11/02/22  0235 11/01/22  1753 10/28/22  0139   INR  1.10 1.32* 1.00     Lab Results   Component Value Date    TROPONINT 0.015 10/27/2022    TROPONINT 0.013 10/27/2022     Results from last 7 days   Lab Units 10/29/22  1410 10/29/22  0739   TSH uIU/mL  --  4.980*   FREE T4 ng/dL 1.82*  --      Results from last 7 days   Lab Units 10/28/22  0139   CHOLESTEROL mg/dL 133   TRIGLYCERIDES mg/dL 184*   HDL CHOL mg/dL 46   LDL CHOL mg/dL 57         No results found for: IRON, FERRITIN, LABIRON, TIBC   Hemoglobin A1C   Date Value Ref Range Status   10/27/2022 11.80 (H) 4.80 - 5.60 % Final     Magnesium   Date Value Ref Range Status   11/02/2022 2.4 1.6 - 2.6 mg/dL Final        Tele: Sinus Rythym      Assessment:     1. Coronary artery disease s/p coronary artery bypass graft    2.  Sick sinus syndrome s/p pacemaker at age 28 with left atrial appendage closure now.    3.Uncontrolled diabetes    4.Uncontrolled hypertension        Plan:     1. Patient is recovering good from her coronary artery bypass graft.  We will switch to aspirin and Plavix in the morning once chest tubes are  out.    2. We will start Farxiga 10 mg daily    3. She is already on high-dose statin.    4.   We will keep on adding the medication once she recovers.

## 2022-11-02 NOTE — PLAN OF CARE
Goal Outcome Evaluation:  Plan of Care Reviewed With: patient           Outcome Evaluation: PT initial evaluation completed for pt s/p CABG x1 presenting with generalized weakness, incisional pain, and decreased functional mobility. Pt ambulated 110ft with Rene x2 and B UE support. Pt's decreased independence warrants PT skilled care. Recommend D/C home with assistance.

## 2022-11-02 NOTE — PROGRESS NOTES
Cardiothoracic Surgery Progress Note      POD # 1 s/p CABG x 1, LAAL     LOS: 6 days      Subjective:  No complaints    Objective:  Vital Signs  Temp:  [97.7 °F (36.5 °C)-100.2 °F (37.9 °C)] 99 °F (37.2 °C)  Heart Rate:  [] 79  Resp:  [16-36] 34  BP: ()/(48-83) 108/69  Arterial Line BP: ()/(52-87) 109/68  FiO2 (%):  [40 %-100 %] 40 %    Physical Exam:   General Appearance: alert, appears stated age and cooperative   Lungs: clear to auscultation, respirations regular, respirations even and respirations unlabored   Heart: regular rhythm & normal rate, normal S1, S2 and no murmur, no gallop, no rub   Skin: Incision c/d/i     Results:  Results from last 7 days   Lab Units 11/02/22  0235   WBC 10*3/mm3 26.41*   HEMOGLOBIN g/dL 12.2   HEMATOCRIT % 37.2   PLATELETS 10*3/mm3 137*     Results from last 7 days   Lab Units 11/02/22  0235   SODIUM mmol/L 137   POTASSIUM mmol/L 4.8   CHLORIDE mmol/L 103   CO2 mmol/L 20.0*   BUN mg/dL 13   CREATININE mg/dL 0.72   GLUCOSE mg/dL 191*   CALCIUM mg/dL 9.3       Assessment:  POD # 1 s/p CABG x 1, LAAL  Expected postoperative course    Plan:  D/C shade cox  Continue chest tubes and wires  Ok to resume [employer] pacemaker  Wean levophed as tolerated      Bahman Lane MD  11/02/22  09:24 EDT

## 2022-11-02 NOTE — PROGRESS NOTES
Pulmonary/Critical Care Follow-up     LOS: 6 days   Patient Care Team:  Bobby Kiser FNP as PCP - General (Nurse Practitioner)  Micky Irby MD as Consulting Physician (Endocrinology)    Chief Complaint: Chest pain/medical management of issues postoperatively after CABG including but not limited to: Respiratory, electrolyte, and glycemic management.      Subjective     Interval History:   Tawanna Huang is a 42 y.o. female with PMH of T2DM, thyroid CA s/p thyroidectomy, tachy-gilma syndrome s/p PPM admitted to [facility] on 10/27 from [facility] for NSTEMI and CABG evaluation.     The patient presented to the OSH on 10/21 with complaints of intermittent chest pain with acute worsening prompting ED evaluation and EKG consistent with NSTEMI. LHC displayed >90% LAD stenosis with an EF of 50%. She was transferred to [facility] and evaluated by CTS who recommended surgical revascularization and underwent pre-operative work-up. She has been maintained on a Heparin gtt and ASA. TTE results are pending.     UDS + THC.     CXR shows reduced lung volumes. Pre-op PFTs as follows: FVC 2.36, 64%; FEV1 2.05, 69%; and ratio 0.87.     She underwent CABG x1 (LIMA to LAD) with left atrial appendage ligation, by Dr. Lane on 11/1/2022.     Interval history:  Patient extubated overnight.  She was a little drowsy this morning but seems perfectly awake for me.  Pain is adequately controlled.  She is on low-dose norepinephrine.  T-max 100.2 Fahrenheit.  She is on high levels of insulin on drip.    History taken from: chart     PMH/FH/Social History were reviewed and updated appropriately in the electronic medical record.     Review of Systems:    Review of 14 systems was completed with positives and pertinent negatives noted in the subjective section.  All other systems reviewed and are negative.         Objective     Vital Signs  Temp:  [98 °F (36.7 °C)-100.2 °F (37.9 °C)] 98.8 °F (37.1 °C)  Heart Rate:  [] 78  Resp:  [16-36]  30  BP: ()/(48-83) 90/58  Arterial Line BP: ()/(52-87) 109/68  FiO2 (%):  [40 %-100 %] 40 %  11/01 0701 - 11/02 0700  In: 2722.5 [I.V.:1507.5]  Out: 2435 [Urine:1745]  Body mass index is 39.08 kg/m².  FiO2 (%):  [40 %-100 %] 40 %  S RR:  [12-16] 16  PEEP/CPAP (cm H2O):  [5 cm H20] 5 cm H20  VA SUP:  [10 cm H20] 10 cm H20  MAP (cm H2O):  [8.1-10] 8.1  Physical Exam:     Constitutional:   Awake and alert, in no acute distress   Head:    Normocephalic, without obvious abnormality, atraumatic   Eyes:            Lids and lashes normal   ENMT:   Ears appear intact with no abnormalities noted       Neck:   No adenopathy, trachea midline, right internal jugular catheter in place       Lungs/Resp:    Normal effort, symmetric chest rise, no crepitus, diminished breath sounds diffusely and bilaterally, left upper chest pacemaker.                Heart/CV:    Regular rhythm and normal rate, normal S1 and S2, no            murmur   Abdomen/GI:     Normal bowel sounds, no masses, no organomegaly, soft,        non-tender, non-distended   :     Deferred   Extremities/MSK:   No clubbing or cyanosis.  No edema.  Normal tone.  No deformities.    Pulses:   Pulses palpable and equal bilaterally   Skin:   No bleeding, bruising or rash   Heme/Lymph:   No cervical or supraclavicular adenopathy.    Neurologic:    Psychiatric:     Moves all extremities without obvious focal motor deficit.  Awake and follows commands.  Nonagitated.         Above findings represent my personal examination findings from today.  Electronically signed by:Hiren Hays MD 11/02/22 12:12 EDT     Results Review:     I reviewed the patient's new clinical results.   Results from last 7 days   Lab Units 11/02/22  0235 11/01/22  2133 11/01/22  1753 11/01/22  1034 11/01/22  0657 10/31/22  0613   SODIUM mmol/L 137 139 136   < > 137 134*   POTASSIUM mmol/L 4.8 4.9 3.6   < > 4.7 4.5   CHLORIDE mmol/L 103 105 102   < > 99 97*   CO2 mmol/L 20.0* 22.0 23.0    < > 26.0 26.0   BUN mg/dL 13 14 14   < > 15 11   CREATININE mg/dL 0.72 0.74 0.74   < > 0.72 0.71   CALCIUM mg/dL 9.3 9.9 10.2   < > 8.8 8.7   BILIRUBIN mg/dL 0.4  --   --   --  0.4 0.4   ALK PHOS U/L 64  --   --   --  87 92   ALT (SGPT) U/L 19  --   --   --  22 23   AST (SGOT) U/L 31  --   --   --  14 17   GLUCOSE mg/dL 191* 174* 179*   < > 273* 241*    < > = values in this interval not displayed.     Results from last 7 days   Lab Units 11/02/22 0235 11/01/22 2133 11/01/22 1753   WBC 10*3/mm3 26.41* 20.71* 18.25*   HEMOGLOBIN g/dL 12.2 12.0 12.0   HEMATOCRIT % 37.2 36.7 35.7   PLATELETS 10*3/mm3 137* 81* 78*     Results from last 7 days   Lab Units 11/01/22  2310   PH, ARTERIAL pH units 7.310*   PO2 ART mm Hg 118.0*   PCO2, ARTERIAL mm Hg 46.4*   HCO3 ART mmol/L 23.3     Results from last 7 days   Lab Units 11/02/22 0235 11/01/22 2133 11/01/22 1753 11/01/22  0657   MAGNESIUM mg/dL 2.4  --  1.8 2.0   PHOSPHORUS mg/dL 6.1* 6.0* 4.4  --      Results from last 7 days   Lab Units 10/27/22  2012   HEMOGLOBIN A1C % 11.80*         I reviewed the patient's new imaging including images and reports.  Chest x-ray 11/2/2022: Interval extubation.  Persistent low lung volumes and basilar atelectasis.  Postoperative changes without obvious pneumothorax.     Medication Review:   acetaminophen, 650 mg, Oral, Q8H  aspirin, 325 mg, Oral, Daily  atorvastatin, 40 mg, Oral, Nightly  ceFAZolin, 2 g, Intravenous, Q8H  famotidine, 20 mg, Oral, BID AC  gabapentin, 100 mg, Oral, Q8H  levothyroxine, 175 mcg, Oral, Q AM  metoprolol tartrate, 12.5 mg, Oral, Q12H  metoprolol tartrate, 2.5 mg, Intravenous, Q6H  pharmacy consult - [facility], , Does not apply, Daily  [MAR Hold] polyethylene glycol, 17 g, Oral, BID  senna-docusate sodium, 2 tablet, Oral, BID  sodium chloride, 10 mL, Intravenous, Q12H      dextrose 5 % with KCl 20 mEq, 30 mL/hr, Last Rate: 30 mL/hr (11/01/22 2337)  insulin, 0-100 Units/hr, Last Rate: 15.6 Units/hr (11/02/22  0755)  niCARdipine, 5-15 mg/hr  nitroglycerin, 5-200 mcg/min, Last Rate: Stopped (11/01/22 1935)  norepinephrine, 0.02-0.3 mcg/kg/min, Last Rate: 0.01 mcg/kg/min (11/02/22 0900)  phenylephrine, 0.5-3 mcg/kg/min  Scopolamine, 1 patch        Assessment & Plan       MV CAD     Tachy-gilma syndrome s/p PPM     Acquired hypothyroidism- H/O thyroid cancer s/p thryoidectomy 2012    T2DM on insulin and metformin     Class 2 obesity in adult    Constipation    Marijuana use (UDS + THC)     42 y.o. female with history of poorly controlled diabetes (hemoglobin A1c 11.8% 10/27/2022), thyroid cancer status post thyroidectomy, tachybradycardia syndrome status post permanent pacemaker, admitted to [facility] on 10/27/2022 from [facility] after NSTEMI for CABG evaluation.  She was found to have significant LAD disease with proximal disease not amenable to stent.  Urine drug screen was positive for THC.    Patient underwent CABG x1 with left atrial appendage clip by Dr. Lane on 11/1/2022.  Patient extubated overnight.  Doing fairly well.  Still requiring a lot of insulin on drip.  Low lung volumes and atelectasis on chest x-ray.    Plan:  1.  For coronary artery disease: Status post CABG x1 on 11/1/2022: Aspirin, statin, beta-blocker as tolerated.  Postoperative surgical management per CT surgery.  Cardiology following.  2.  For uncontrolled diabetes: Insulin drip protocol for now.  Continue drip as blood sugars have not stabilized.  Hopefully can transition tomorrow if tolerating p.o. and blood sugar stable.  3.  For acquired hypothyroidism: Continue levothyroxine.  Hospitalist had reduced dose from 200 mcg 175 mcg and we will continue this with plan to follow-up with PCP in 6 weeks with repeat labs.  4.  DVT prophylaxis: Limit to mechanical only while mediastinal tubes are in place.  5.  Respiratory: Supplemental oxygen as needed.  Encourage incentive spirometer use.  Mobilize.    6.  We will follow while in the  intensive care unit.    Level of Risk High due to: illness with threat to life or bodily function tenuous respiratory status in the setting of major chest surgery with underlying obesity and poorly controlled diabetes.    Electronically signed by:  Hiren Hays MD  11/02/22  12:12 EDT

## 2022-11-02 NOTE — PLAN OF CARE
Goal Outcome Evaluation:    To 2H from OR at 1733 s/p CABG x1; extubated at 2326.    Neuro intact, flat affect.    On 2L/nc, low-grade tachypnea, respirations shallow, IS effort poor.    SR, SBP 90-110s on low-dose Levo. Scant MT output: 1-2: 85mL, 3: 55mL.    Electrolytes and Hct/Hgb WNL, SCr steady at 0.7, excellent UOP (850mL) overnight. WBC elevated at 26 this morning. Remains on insulin gtt, FSBG remains steady in 180s despite rate increase q1h.

## 2022-11-02 NOTE — PLAN OF CARE
Goal Outcome Evaluation:  Plan of Care Reviewed With: patient        Progress: improving  Outcome Evaluation: Neuro intact. 2L NC with MT drainage at 1/2: 55ml 3: 55ml which is serosanguineous and without airleak. NSR. PT remains on INS gtt d/t labile bloodsugars. [place]-enoc, arterial line, and laguerre dc'd per CT sx.

## 2022-11-02 NOTE — CASE MANAGEMENT/SOCIAL WORK
Continued Stay Note   [facility]     Patient Name: Tawanna Huang  MRN: [id number]  Today's Date: 11/2/2022    Admit Date: 10/27/2022    Plan: home   Discharge Plan     Row Name 11/02/22 1135       Plan    Plan home    Patient/Family in Agreement with Plan yes    Plan Comments I saw Ms. Huang in room.  She is post op CABG x1 on O2 at 2L.  Still on pressor for B/P support.  Will await therapy notes to assist with final discharge plan.  CM following.    Final Discharge Disposition Code 01 - home or self-care               Discharge Codes    No documentation.               Expected Discharge Date and Time     Expected Discharge Date Expected Discharge Time    Nov 8, 2022             Sun Hernandez RN

## 2022-11-03 ENCOUNTER — APPOINTMENT (OUTPATIENT)
Dept: GENERAL RADIOLOGY | Facility: HOSPITAL | Age: 42
End: 2022-11-03

## 2022-11-03 LAB
ACT BLD: 115 SECONDS (ref 82–152)
ACT BLD: 150 SECONDS (ref 82–152)
ACT BLD: 405 SECONDS (ref 82–152)
ACT BLD: 416 SECONDS (ref 82–152)
ACT BLD: 451 SECONDS (ref 82–152)
ALBUMIN SERPL-MCNC: 4.2 G/DL (ref 3.5–5.2)
ALBUMIN/GLOB SERPL: 1.4 G/DL
ALP SERPL-CCNC: 65 U/L (ref 39–117)
ALT SERPL W P-5'-P-CCNC: 13 U/L (ref 1–33)
ANION GAP SERPL CALCULATED.3IONS-SCNC: 13 MMOL/L (ref 5–15)
AST SERPL-CCNC: 19 U/L (ref 1–32)
BASE EXCESS BLDA CALC-SCNC: -1 MMOL/L (ref -5–5)
BASE EXCESS BLDA CALC-SCNC: 0 MMOL/L (ref -5–5)
BASE EXCESS BLDA CALC-SCNC: 0 MMOL/L (ref -5–5)
BASE EXCESS BLDA CALC-SCNC: 1 MMOL/L (ref -5–5)
BASE EXCESS BLDA CALC-SCNC: 3 MMOL/L (ref -5–5)
BASE EXCESS BLDA CALC-SCNC: 3 MMOL/L (ref -5–5)
BASOPHILS # BLD AUTO: 0.11 10*3/MM3 (ref 0–0.2)
BASOPHILS NFR BLD AUTO: 0.4 % (ref 0–1.5)
BILIRUB SERPL-MCNC: 0.3 MG/DL (ref 0–1.2)
BUN SERPL-MCNC: 16 MG/DL (ref 6–20)
BUN/CREAT SERPL: 20 (ref 7–25)
CA-I BLDA-SCNC: 0.95 MMOL/L (ref 1.2–1.32)
CA-I BLDA-SCNC: 0.96 MMOL/L (ref 1.2–1.32)
CA-I BLDA-SCNC: 1.03 MMOL/L (ref 1.2–1.32)
CA-I BLDA-SCNC: 1.09 MMOL/L (ref 1.2–1.32)
CA-I BLDA-SCNC: 1.19 MMOL/L (ref 1.2–1.32)
CA-I BLDA-SCNC: 1.47 MMOL/L (ref 1.2–1.32)
CALCIUM SPEC-SCNC: 9 MG/DL (ref 8.6–10.5)
CHLORIDE SERPL-SCNC: 97 MMOL/L (ref 98–107)
CO2 BLDA-SCNC: 24 MMOL/L (ref 24–29)
CO2 BLDA-SCNC: 25 MMOL/L (ref 24–29)
CO2 BLDA-SCNC: 27 MMOL/L (ref 24–29)
CO2 BLDA-SCNC: 27 MMOL/L (ref 24–29)
CO2 BLDA-SCNC: 29 MMOL/L (ref 24–29)
CO2 BLDA-SCNC: 30 MMOL/L (ref 24–29)
CO2 SERPL-SCNC: 22 MMOL/L (ref 22–29)
CREAT SERPL-MCNC: 0.8 MG/DL (ref 0.57–1)
DEPRECATED RDW RBC AUTO: 44.1 FL (ref 37–54)
EGFRCR SERPLBLD CKD-EPI 2021: 94.5 ML/MIN/1.73
EOSINOPHIL # BLD AUTO: 0 10*3/MM3 (ref 0–0.4)
EOSINOPHIL NFR BLD AUTO: 0 % (ref 0.3–6.2)
ERYTHROCYTE [DISTWIDTH] IN BLOOD BY AUTOMATED COUNT: 13.9 % (ref 12.3–15.4)
GLOBULIN UR ELPH-MCNC: 3.1 GM/DL
GLUCOSE BLDC GLUCOMTR-MCNC: 103 MG/DL (ref 70–130)
GLUCOSE BLDC GLUCOMTR-MCNC: 117 MG/DL (ref 70–130)
GLUCOSE BLDC GLUCOMTR-MCNC: 119 MG/DL (ref 70–130)
GLUCOSE BLDC GLUCOMTR-MCNC: 123 MG/DL (ref 70–130)
GLUCOSE BLDC GLUCOMTR-MCNC: 146 MG/DL (ref 70–130)
GLUCOSE BLDC GLUCOMTR-MCNC: 151 MG/DL (ref 70–130)
GLUCOSE BLDC GLUCOMTR-MCNC: 159 MG/DL (ref 70–130)
GLUCOSE BLDC GLUCOMTR-MCNC: 170 MG/DL (ref 70–130)
GLUCOSE BLDC GLUCOMTR-MCNC: 191 MG/DL (ref 70–130)
GLUCOSE BLDC GLUCOMTR-MCNC: 196 MG/DL (ref 70–130)
GLUCOSE BLDC GLUCOMTR-MCNC: 198 MG/DL (ref 70–130)
GLUCOSE BLDC GLUCOMTR-MCNC: 201 MG/DL (ref 70–130)
GLUCOSE BLDC GLUCOMTR-MCNC: 208 MG/DL (ref 70–130)
GLUCOSE BLDC GLUCOMTR-MCNC: 211 MG/DL (ref 70–130)
GLUCOSE BLDC GLUCOMTR-MCNC: 221 MG/DL (ref 70–130)
GLUCOSE BLDC GLUCOMTR-MCNC: 238 MG/DL (ref 70–130)
GLUCOSE BLDC GLUCOMTR-MCNC: 248 MG/DL (ref 70–130)
GLUCOSE SERPL-MCNC: 119 MG/DL (ref 65–99)
HCO3 BLDA-SCNC: 22.9 MMOL/L (ref 22–26)
HCO3 BLDA-SCNC: 24.1 MMOL/L (ref 22–26)
HCO3 BLDA-SCNC: 25.2 MMOL/L (ref 22–26)
HCO3 BLDA-SCNC: 25.9 MMOL/L (ref 22–26)
HCO3 BLDA-SCNC: 27.7 MMOL/L (ref 22–26)
HCO3 BLDA-SCNC: 28.4 MMOL/L (ref 22–26)
HCT VFR BLD AUTO: 35.2 % (ref 34–46.6)
HCT VFR BLDA CALC: 26 % (ref 38–51)
HCT VFR BLDA CALC: 29 % (ref 38–51)
HCT VFR BLDA CALC: 30 % (ref 38–51)
HCT VFR BLDA CALC: 31 % (ref 38–51)
HCT VFR BLDA CALC: 33 % (ref 38–51)
HCT VFR BLDA CALC: 38 % (ref 38–51)
HGB BLD-MCNC: 11.6 G/DL (ref 12–15.9)
HGB BLDA-MCNC: 10.2 G/DL (ref 12–17)
HGB BLDA-MCNC: 10.5 G/DL (ref 12–17)
HGB BLDA-MCNC: 11.2 G/DL (ref 12–17)
HGB BLDA-MCNC: 12.9 G/DL (ref 12–17)
HGB BLDA-MCNC: 8.8 G/DL (ref 12–17)
HGB BLDA-MCNC: 9.9 G/DL (ref 12–17)
IMM GRANULOCYTES # BLD AUTO: 0.27 10*3/MM3 (ref 0–0.05)
IMM GRANULOCYTES NFR BLD AUTO: 1.1 % (ref 0–0.5)
LYMPHOCYTES # BLD AUTO: 1.33 10*3/MM3 (ref 0.7–3.1)
LYMPHOCYTES NFR BLD AUTO: 5.4 % (ref 19.6–45.3)
MAGNESIUM SERPL-MCNC: 1.9 MG/DL (ref 1.6–2.6)
MCH RBC QN AUTO: 28.8 PG (ref 26.6–33)
MCHC RBC AUTO-ENTMCNC: 33 G/DL (ref 31.5–35.7)
MCV RBC AUTO: 87.3 FL (ref 79–97)
MONOCYTES # BLD AUTO: 2.19 10*3/MM3 (ref 0.1–0.9)
MONOCYTES NFR BLD AUTO: 8.8 % (ref 5–12)
NEUTROPHILS NFR BLD AUTO: 20.89 10*3/MM3 (ref 1.7–7)
NEUTROPHILS NFR BLD AUTO: 84.3 % (ref 42.7–76)
NRBC BLD AUTO-RTO: 0 /100 WBC (ref 0–0.2)
PCO2 BLDA: 33.2 MM HG (ref 35–45)
PCO2 BLDA: 34.7 MM HG (ref 35–45)
PCO2 BLDA: 44 MM HG (ref 35–45)
PCO2 BLDA: 45.7 MM HG (ref 35–45)
PCO2 BLDA: 46.2 MM HG (ref 35–45)
PCO2 BLDA: 47.7 MM HG (ref 35–45)
PH BLDA: 7.36 PH UNITS (ref 7.35–7.6)
PH BLDA: 7.37 PH UNITS (ref 7.35–7.6)
PH BLDA: 7.38 PH UNITS (ref 7.35–7.6)
PH BLDA: 7.39 PH UNITS (ref 7.35–7.6)
PH BLDA: 7.45 PH UNITS (ref 7.35–7.6)
PH BLDA: 7.45 PH UNITS (ref 7.35–7.6)
PLATELET # BLD AUTO: 115 10*3/MM3 (ref 140–450)
PMV BLD AUTO: 13.1 FL (ref 6–12)
PO2 BLDA: 261 MMHG (ref 80–105)
PO2 BLDA: 271 MMHG (ref 80–105)
PO2 BLDA: 323 MMHG (ref 80–105)
PO2 BLDA: 369 MMHG (ref 80–105)
PO2 BLDA: 40 MMHG (ref 80–105)
PO2 BLDA: 412 MMHG (ref 80–105)
POTASSIUM BLDA-SCNC: 4 MMOL/L (ref 3.5–4.9)
POTASSIUM BLDA-SCNC: 4 MMOL/L (ref 3.5–4.9)
POTASSIUM BLDA-SCNC: 4.2 MMOL/L (ref 3.5–4.9)
POTASSIUM BLDA-SCNC: 5.2 MMOL/L (ref 3.5–4.9)
POTASSIUM BLDA-SCNC: 5.6 MMOL/L (ref 3.5–4.9)
POTASSIUM BLDA-SCNC: 6.2 MMOL/L (ref 3.5–4.9)
POTASSIUM SERPL-SCNC: 4.7 MMOL/L (ref 3.5–5.2)
PROT SERPL-MCNC: 7.3 G/DL (ref 6–8.5)
QT INTERVAL: 376 MS
QTC INTERVAL: 428 MS
RBC # BLD AUTO: 4.03 10*6/MM3 (ref 3.77–5.28)
SAO2 % BLDA: 100 % (ref 95–98)
SAO2 % BLDA: 73 % (ref 95–98)
SODIUM BLD-SCNC: 132 MMOL/L (ref 138–146)
SODIUM BLD-SCNC: 133 MMOL/L (ref 138–146)
SODIUM BLD-SCNC: 133 MMOL/L (ref 138–146)
SODIUM BLD-SCNC: 136 MMOL/L (ref 138–146)
SODIUM BLD-SCNC: 136 MMOL/L (ref 138–146)
SODIUM BLD-SCNC: 139 MMOL/L (ref 138–146)
SODIUM SERPL-SCNC: 132 MMOL/L (ref 136–145)
WBC NRBC COR # BLD: 24.79 10*3/MM3 (ref 3.4–10.8)

## 2022-11-03 PROCEDURE — 97530 THERAPEUTIC ACTIVITIES: CPT

## 2022-11-03 PROCEDURE — 63710000001 INSULIN DETEMIR PER 5 UNITS: Performed by: INTERNAL MEDICINE

## 2022-11-03 PROCEDURE — 0 MAGNESIUM SULFATE 4 GM/100ML SOLUTION: Performed by: THORACIC SURGERY (CARDIOTHORACIC VASCULAR SURGERY)

## 2022-11-03 PROCEDURE — 71045 X-RAY EXAM CHEST 1 VIEW: CPT

## 2022-11-03 PROCEDURE — 99024 POSTOP FOLLOW-UP VISIT: CPT | Performed by: INTERNAL MEDICINE

## 2022-11-03 PROCEDURE — 85025 COMPLETE CBC W/AUTO DIFF WBC: CPT | Performed by: INTERNAL MEDICINE

## 2022-11-03 PROCEDURE — 80053 COMPREHEN METABOLIC PANEL: CPT | Performed by: INTERNAL MEDICINE

## 2022-11-03 PROCEDURE — 25010000002 CEFAZOLIN PER 500 MG

## 2022-11-03 PROCEDURE — 83735 ASSAY OF MAGNESIUM: CPT | Performed by: INTERNAL MEDICINE

## 2022-11-03 PROCEDURE — 93010 ELECTROCARDIOGRAM REPORT: CPT | Performed by: INTERNAL MEDICINE

## 2022-11-03 PROCEDURE — 82962 GLUCOSE BLOOD TEST: CPT

## 2022-11-03 PROCEDURE — 25010000002 MORPHINE PER 10 MG: Performed by: PHYSICIAN ASSISTANT

## 2022-11-03 PROCEDURE — 99232 SBSQ HOSP IP/OBS MODERATE 35: CPT | Performed by: INTERNAL MEDICINE

## 2022-11-03 PROCEDURE — 93005 ELECTROCARDIOGRAM TRACING: CPT | Performed by: PHYSICIAN ASSISTANT

## 2022-11-03 PROCEDURE — 63710000001 INSULIN LISPRO (HUMAN) PER 5 UNITS: Performed by: INTERNAL MEDICINE

## 2022-11-03 PROCEDURE — 25010000002 MORPHINE PER 10 MG: Performed by: THORACIC SURGERY (CARDIOTHORACIC VASCULAR SURGERY)

## 2022-11-03 PROCEDURE — 25010000002 HEPARIN (PORCINE) PER 1000 UNITS: Performed by: INTERNAL MEDICINE

## 2022-11-03 RX ORDER — INSULIN LISPRO 100 [IU]/ML
8 INJECTION, SOLUTION INTRAVENOUS; SUBCUTANEOUS
Status: DISCONTINUED | OUTPATIENT
Start: 2022-11-03 | End: 2022-11-04

## 2022-11-03 RX ORDER — HEPARIN SODIUM 5000 [USP'U]/ML
5000 INJECTION, SOLUTION INTRAVENOUS; SUBCUTANEOUS EVERY 8 HOURS SCHEDULED
Status: DISCONTINUED | OUTPATIENT
Start: 2022-11-03 | End: 2022-11-06 | Stop reason: HOSPADM

## 2022-11-03 RX ORDER — ASPIRIN 81 MG/1
81 TABLET ORAL DAILY
Status: DISCONTINUED | OUTPATIENT
Start: 2022-11-03 | End: 2022-11-06 | Stop reason: HOSPADM

## 2022-11-03 RX ORDER — NICOTINE POLACRILEX 4 MG
15 LOZENGE BUCCAL
Status: DISCONTINUED | OUTPATIENT
Start: 2022-11-03 | End: 2022-11-06 | Stop reason: HOSPADM

## 2022-11-03 RX ORDER — CLOPIDOGREL BISULFATE 75 MG/1
75 TABLET ORAL DAILY
Status: DISCONTINUED | OUTPATIENT
Start: 2022-11-03 | End: 2022-11-06 | Stop reason: HOSPADM

## 2022-11-03 RX ORDER — INSULIN LISPRO 100 [IU]/ML
0-14 INJECTION, SOLUTION INTRAVENOUS; SUBCUTANEOUS
Status: DISCONTINUED | OUTPATIENT
Start: 2022-11-03 | End: 2022-11-06 | Stop reason: HOSPADM

## 2022-11-03 RX ORDER — MORPHINE SULFATE 2 MG/ML
2 INJECTION, SOLUTION INTRAMUSCULAR; INTRAVENOUS
Status: DISCONTINUED | OUTPATIENT
Start: 2022-11-03 | End: 2022-11-05

## 2022-11-03 RX ORDER — DEXTROSE MONOHYDRATE 25 G/50ML
25 INJECTION, SOLUTION INTRAVENOUS
Status: DISCONTINUED | OUTPATIENT
Start: 2022-11-03 | End: 2022-11-06 | Stop reason: HOSPADM

## 2022-11-03 RX ADMIN — METOPROLOL TARTRATE 12.5 MG: 25 TABLET, FILM COATED ORAL at 21:03

## 2022-11-03 RX ADMIN — EMPAGLIFLOZIN 10 MG: 10 TABLET, FILM COATED ORAL at 21:04

## 2022-11-03 RX ADMIN — HEPARIN SODIUM 5000 UNITS: 5000 INJECTION INTRAVENOUS; SUBCUTANEOUS at 21:20

## 2022-11-03 RX ADMIN — OXYCODONE 10 MG: 5 TABLET ORAL at 08:05

## 2022-11-03 RX ADMIN — ASPIRIN 325 MG: 325 TABLET, COATED ORAL at 08:05

## 2022-11-03 RX ADMIN — FAMOTIDINE 20 MG: 20 TABLET ORAL at 16:33

## 2022-11-03 RX ADMIN — Medication 10 ML: at 21:13

## 2022-11-03 RX ADMIN — OXYCODONE 10 MG: 5 TABLET ORAL at 21:04

## 2022-11-03 RX ADMIN — SENNOSIDES AND DOCUSATE SODIUM 2 TABLET: 50; 8.6 TABLET ORAL at 21:04

## 2022-11-03 RX ADMIN — OXYCODONE 10 MG: 5 TABLET ORAL at 01:11

## 2022-11-03 RX ADMIN — INSULIN LISPRO 5 UNITS: 100 INJECTION, SOLUTION INTRAVENOUS; SUBCUTANEOUS at 16:33

## 2022-11-03 RX ADMIN — INSULIN LISPRO 5 UNITS: 100 INJECTION, SOLUTION INTRAVENOUS; SUBCUTANEOUS at 21:02

## 2022-11-03 RX ADMIN — GABAPENTIN 100 MG: 100 CAPSULE ORAL at 03:35

## 2022-11-03 RX ADMIN — HYDROCODONE BITARTRATE AND ACETAMINOPHEN 1 TABLET: 7.5; 325 TABLET ORAL at 17:54

## 2022-11-03 RX ADMIN — GABAPENTIN 100 MG: 100 CAPSULE ORAL at 21:04

## 2022-11-03 RX ADMIN — SENNOSIDES AND DOCUSATE SODIUM 2 TABLET: 50; 8.6 TABLET ORAL at 08:05

## 2022-11-03 RX ADMIN — OXYCODONE 10 MG: 5 TABLET ORAL at 13:18

## 2022-11-03 RX ADMIN — INSULIN LISPRO 8 UNITS: 100 INJECTION, SOLUTION INTRAVENOUS; SUBCUTANEOUS at 16:32

## 2022-11-03 RX ADMIN — FAMOTIDINE 20 MG: 20 TABLET ORAL at 08:05

## 2022-11-03 RX ADMIN — INSULIN DETEMIR 30 UNITS: 100 INJECTION, SOLUTION SUBCUTANEOUS at 11:21

## 2022-11-03 RX ADMIN — CEFAZOLIN 2 G: 10 INJECTION, POWDER, FOR SOLUTION INTRAVENOUS at 06:13

## 2022-11-03 RX ADMIN — INSULIN LISPRO 8 UNITS: 100 INJECTION, SOLUTION INTRAVENOUS; SUBCUTANEOUS at 11:21

## 2022-11-03 RX ADMIN — MORPHINE SULFATE 2 MG: 2 INJECTION, SOLUTION INTRAMUSCULAR; INTRAVENOUS at 07:14

## 2022-11-03 RX ADMIN — CLOPIDOGREL BISULFATE 75 MG: 75 TABLET ORAL at 17:54

## 2022-11-03 RX ADMIN — MAGNESIUM SULFATE HEPTAHYDRATE 4 G: 40 INJECTION, SOLUTION INTRAVENOUS at 03:32

## 2022-11-03 RX ADMIN — MORPHINE SULFATE 2 MG: 2 INJECTION, SOLUTION INTRAMUSCULAR; INTRAVENOUS at 17:54

## 2022-11-03 RX ADMIN — METOPROLOL TARTRATE 12.5 MG: 25 TABLET, FILM COATED ORAL at 08:05

## 2022-11-03 RX ADMIN — INSULIN LISPRO 13 UNITS: 100 INJECTION, SOLUTION INTRAVENOUS; SUBCUTANEOUS at 11:22

## 2022-11-03 RX ADMIN — ACETAMINOPHEN 650 MG: 325 TABLET, FILM COATED ORAL at 02:42

## 2022-11-03 RX ADMIN — LEVOTHYROXINE SODIUM 175 MCG: 150 TABLET ORAL at 06:13

## 2022-11-03 RX ADMIN — MORPHINE SULFATE 2 MG: 2 INJECTION, SOLUTION INTRAMUSCULAR; INTRAVENOUS at 01:09

## 2022-11-03 RX ADMIN — GABAPENTIN 100 MG: 100 CAPSULE ORAL at 11:22

## 2022-11-03 RX ADMIN — ATORVASTATIN CALCIUM 40 MG: 40 TABLET, FILM COATED ORAL at 21:04

## 2022-11-03 RX ADMIN — HEPARIN SODIUM 5000 UNITS: 5000 INJECTION INTRAVENOUS; SUBCUTANEOUS at 13:18

## 2022-11-03 NOTE — NURSING NOTE
Prior to central line removal, order for the removal of catheter was verified, patient was assessed, necessary materials were gathered and patient was educated regarding procedure .    Patient was positioned flat to ensure that the insertion site was at or below the level of the heart.    Hands were washed, clean gloves were applied and central line dressing was gently removed. Catheter exit site was cultured.     A new pair of clean gloves were then applied. Insertion site was cleansed with 2% Chlorhexidine swab using a circular motion beginning at the insertion site and moving outward for 30 seconds and allowed to dry.     Clamp on line was present and clamped.     Patient was instructed to hold breath as catheter was withdrawn.     The central line was grasped at the insertion site and slowly pulled outward parallel to the skin. Resistance was not met.    After central line was completely removed, a sterile 4x4 gauze pad was used to apply light pressure until bleeding stopped. At that time, petroleum-based gauze and a sterile occlusive dressing was applied to exit site.     Patient was instructed to keep dressing in place for at least 24 hours and to remain in a flat or reclining position for 30 minutes post-removal.     Catheter was inspected after removal and was intact. Tip of central line was not sent for culture. Patient tolerated procedure.

## 2022-11-03 NOTE — PROGRESS NOTES
Cardiothoracic Surgery Progress Note      POD # 2 s/p CABG x 1, LAAL     LOS: 7 days      Subjective:  Up in chair, without complaint this am    Objective:  Vital Signs  Temp:  [97.8 °F (36.6 °C)-99 °F (37.2 °C)] 98.2 °F (36.8 °C)  Heart Rate:  [75-88] 80  Resp:  [20-35] 24  BP: ()/(53-88) 101/66  Arterial Line BP: (109-120)/(66-68) 109/68    Physical Exam:   General Appearance: alert, appears stated age and cooperative   Lungs: clear to auscultation, respirations regular, respirations even and respirations unlabored   Heart: regular rhythm & normal rate, normal S1, S2 and no murmur, no gallop, no rub   Skin: Incision c/d/i     Results:    Results from last 7 days   Lab Units 11/03/22  0243   WBC 10*3/mm3 24.79*   HEMOGLOBIN g/dL 11.6*   HEMATOCRIT % 35.2   PLATELETS 10*3/mm3 115*     Results from last 7 days   Lab Units 11/03/22  0243   SODIUM mmol/L 132*   POTASSIUM mmol/L 4.7   CHLORIDE mmol/L 97*   CO2 mmol/L 22.0   BUN mg/dL 16   CREATININE mg/dL 0.80   GLUCOSE mg/dL 119*   CALCIUM mg/dL 9.0       Assessment:  POD # 2 s/p CABG x 1, LAAL  Expected postoperative course    Plan:  D/C chest tubes and wires  D/C central line  Transfer to telemetry  Ambulate  Pulmonary toilet  ASA, statin, BB    Bahman Lane MD  11/03/22  07:03 EDT

## 2022-11-03 NOTE — PLAN OF CARE
Goal Outcome Evaluation:  Plan of Care Reviewed With: patient        Progress: improving  Outcome Evaluation: Neuro intact. Room air and patient independently IS to 500. Lung sounds clear to diminished. NSR. Regular cardiac diet. Adequate UOP. Afebrile. RIJ DCd with peripheral IV secured. Awaiting telemetry bed.

## 2022-11-03 NOTE — PROGRESS NOTES
"[facility]  IP Progress Note    PROBLEM LIST:  CARDIAC:   a.  Coronary artery disease:   1.  Unstable angina, [facility], October 2022: Left main normal, ostial LAD 99% stenosis, rest of the arteries are normal   2. CABG x 1, 11/1/22: LIMA-LAD     b.  Myocardium:   1.  Echo, October 2022: LVEF normal   2. Echo,LVEF 60% with apical akinesia     c.  Valvular:   1.  No significant valvular disease     d.  Electrical:   1.  History of sick sinus syndrome s/p pacemaker placement at age 28   2. TIMA closure during CABG     e.  Percardium:   1.  Normal pericardium     CARDIAC RISK FACTORS:   1.  Type 2 diabetes   2.  Hyperlipidemia   3.  Family history of coronary artery disease   4.  Morbid obesity       NON CARDIAC:   1.  Thyroid cancer s/p thyroidectomy   2.  Fatty liver   3.  Vitamin D deficiency   4.  Headaches     SURGERIES:   1.  Thyroidectomy         HOSPITAL COURSE:    Patient admitted with non-ST segment elevation and underwent successful revascularization.      Subjective   She is doing much better and she will be moving out today.          Objective     Blood pressure 125/77, pulse 81, temperature 97.1 °F (36.2 °C), temperature source Temporal, resp. rate 22, height 162.6 cm (64.02\"), weight 103 kg (227 lb 12.8 oz), SpO2 93 %, not currently breastfeeding.     Intake/Output Summary (Last 24 hours) at 11/3/2022 1715  Last data filed at 11/3/2022 1400  Gross per 24 hour   Intake 3287 ml   Output 610 ml   Net 2677 ml       PHYSICAL EXAM:  General: No acute distress.   Neck: no JVD.  Chest:No respiratory distress, breath sounds are normal. No wheezes,  rhonchi or rales.  Scar well-healed  Cardiovascular: Normal S1 and S2, no murmer, gallop or rub.    Extremities: No edema.    RESULR REVIEW:    I reviewed the patient's new clinical results.      MEDICATIONS:    acetaminophen, 650 mg, Oral, Q8H  aspirin, 325 mg, Oral, Daily  atorvastatin, 40 mg, Oral, Nightly  famotidine, 20 mg, Oral, BID " AC  gabapentin, 100 mg, Oral, Q8H  heparin (porcine), 5,000 Units, Subcutaneous, Q8H  insulin detemir, 30 Units, Subcutaneous, Daily  insulin lispro, 0-14 Units, Subcutaneous, 4x Daily With Meals & Nightly  Insulin Lispro, 8 Units, Subcutaneous, TID With Meals  levothyroxine, 175 mcg, Oral, Q AM  metoprolol tartrate, 12.5 mg, Oral, Q12H  pharmacy consult - [place], , Does not apply, Daily  senna-docusate sodium, 2 tablet, Oral, BID  sodium chloride, 10 mL, Intravenous, Q12H          Results from last 7 days   Lab Units 11/03/22  0243   WBC 10*3/mm3 24.79*   HEMOGLOBIN g/dL 11.6*   HEMATOCRIT % 35.2   PLATELETS 10*3/mm3 115*     Results from last 7 days   Lab Units 11/03/22  0243   SODIUM mmol/L 132*   POTASSIUM mmol/L 4.7   CHLORIDE mmol/L 97*   CO2 mmol/L 22.0   BUN mg/dL 16   CREATININE mg/dL 0.80   CALCIUM mg/dL 9.0   BILIRUBIN mg/dL 0.3   ALK PHOS U/L 65   ALT (SGPT) U/L 13   AST (SGOT) U/L 19   GLUCOSE mg/dL 119*     Results from last 7 days   Lab Units 11/02/22  0235 11/01/22  1753 10/28/22  0139   INR  1.10 1.32* 1.00     Lab Results   Component Value Date    TROPONINT 0.015 10/27/2022    TROPONINT 0.013 10/27/2022     Results from last 7 days   Lab Units 10/29/22  1410 10/29/22  0739   TSH uIU/mL  --  4.980*   FREE T4 ng/dL 1.82*  --      Results from last 7 days   Lab Units 10/28/22  0139   CHOLESTEROL mg/dL 133   TRIGLYCERIDES mg/dL 184*   HDL CHOL mg/dL 46   LDL CHOL mg/dL 57         No results found for: IRON, FERRITIN, LABIRON, TIBC   Hemoglobin A1C   Date Value Ref Range Status   10/27/2022 11.80 (H) 4.80 - 5.60 % Final     Magnesium   Date Value Ref Range Status   11/03/2022 1.9 1.6 - 2.6 mg/dL Final        Tele: Sinus Rythym      Assessment:     1. Coronary artery disease status postcoronary artery bypass graft  2.  Hyperlipidemia  3.  Diabetes        Plan:     1. Because of her non-ST segment elevation MI we will switch her to aspirin 81 mg with Plavix 75 mg daily.    2. We will continue her rest of the  medication.  I will add Farxiga for her vascular protection.

## 2022-11-03 NOTE — PLAN OF CARE
Goal Outcome Evaluation:  Plan of Care Reviewed With: patient        Progress: improving  Outcome Evaluation: Pt increased ambulation distance to 220ft with CGA and B UE support. Pt demonstrated good balance and stability with improved sheridan. Ambulation distance limited by fatigue. Continue to progress as appropriate.

## 2022-11-03 NOTE — PLAN OF CARE
Goal Outcome Evaluation:  Plan of Care Reviewed With: patient        Progress: improving  Outcome Evaluation: Neuro intact. 2L NC. Ambulated 120ft. MT 1/2 70mL & MT 3 95mL. UOP 400mlL.  WBC 24.79, Na 132, mag replaced. Insulin gtt infusing.

## 2022-11-03 NOTE — CASE MANAGEMENT/SOCIAL WORK
Continued Stay Note  [facility]     Patient Name: Tawanna Huang  MRN: [id number]  Today's Date: 11/3/2022    Admit Date: 10/27/2022    Plan: home   Discharge Plan     Row Name 11/03/22 1338       Plan    Plan home    Patient/Family in Agreement with Plan yes    Plan Comments Ms. Huang has orders for telemetry today.  She is on room air and ambulated 220ft.  No discharge needs anticipated at this time.  CM will continue to follow.    Final Discharge Disposition Code 01 - home or self-care               Discharge Codes    No documentation.               Expected Discharge Date and Time     Expected Discharge Date Expected Discharge Time    Nov 8, 2022             Sun Hernandez RN

## 2022-11-03 NOTE — THERAPY TREATMENT NOTE
Patient Name: Tawanna Huang  : 1980    MRN: [id number]                              Today's Date: 11/3/2022       Admit Date: 10/27/2022    Visit Dx:     ICD-10-CM ICD-9-CM   1. Coronary artery disease involving native coronary artery of native heart with unstable angina pectoris (HCC)  I25.110 414.01     411.1   2. Encounter for examination for normal comparison and control in clinical research program  Z00.6 V70.7     Patient Active Problem List   Diagnosis   • Tachy-gilma syndrome s/p PPM    • Enlarged liver   • Acquired hypothyroidism- H/O thyroid cancer s/p thryoidectomy    • T2DM on insulin and metformin    • Class 2 obesity in adult   • Chronic nonintractable headache   • Irregular menses   • Fatty liver   • Vitamin D deficiency   • MV CAD    • Constipation   • Marijuana use (UDS + THC)      Past Medical History:   Diagnosis Date   • Enlarged liver    • Irregular heart beat    • Pacemaker    • Thyroid cancer (HCC)     papillary s/p thyroidectomy and radioactive iodine   • Type 2 diabetes mellitus (HCC)      Past Surgical History:   Procedure Laterality Date   • CORONARY ARTERY BYPASS GRAFT N/A 2022    Procedure: MEDIAN STERNOTOMY, CORONARY ARTERY BYPASS UTILIZING THE LEFT INTERNAL MAMMARY ARTERY GRAFT, LEFT ATRIAL APPENDAGE LIGATION, GEETA;  Surgeon: Bahman Lane MD;  Location: [facility];  Service: Cardiothoracic;  Laterality: N/A;   • PACEMAKER IMPLANTATION     • THYROIDECTOMY  2012      General Information     Row Name 22 1112          Physical Therapy Time and Intention    Document Type therapy note (daily note)  -KG     Mode of Treatment physical therapy  -KG     Row Name 22 1112          General Information    Existing Precautions/Restrictions cardiac;fall;sternal  -KG     Barriers to Rehab none identified  -KG     Row Name 22 111          Cognition    Orientation Status (Cognition) oriented x 4  -KG     Row Name 22 111          Safety Issues,  Functional Mobility    Safety Issues Affecting Function (Mobility) safety precaution awareness;safety precautions follow-through/compliance  -KG     Impairments Affecting Function (Mobility) balance;coordination;endurance/activity tolerance;strength  -KG           User Key  (r) = Recorded By, (t) = Taken By, (c) = Cosigned By    Initials Name Provider Type    KG Christiana Rueda PT Physical Therapist               Mobility     Row Name 11/03/22 1112          Bed Mobility    Comment, (Bed Mobility) UIC  -KG     Row Name 11/03/22 1112          Transfers    Comment, (Transfers) VC's for sequencing and safe hand placement to maintain sternal precautions.  -KG     Row Name 11/03/22 1112          Sit-Stand Transfer    Sit-Stand [place] (Transfers) contact guard;verbal cues  -KG     Row Name 11/03/22 1112          Gait/Stairs (Locomotion)    [place] Level (Gait) contact guard;verbal cues  -KG     Assistive Device (Gait) other (see comments)  B UE support on tele monitor  -KG     Distance in Feet (Gait) 220  -KG     Deviations/Abnormal Patterns (Gait) sheridan decreased;stride length decreased  -KG     Bilateral Gait Deviations forward flexed posture  -KG     Comment, (Gait/Stairs) Pt demonstrated step through gait pattern with slow sheridan and decreased step length. Improved sheridan with continued forward ambulation. Pt demonstrated good balance and stability. Distance limited by fatigue.  -KG           User Key  (r) = Recorded By, (t) = Taken By, (c) = Cosigned By    Initials Name Provider Type    Christiana Hightower PT Physical Therapist               Obj/Interventions     Row Name 11/03/22 1115          Balance    Balance Assessment sitting static balance;standing static balance;standing dynamic balance  -KG     Static Sitting Balance independent  -KG     Position, Sitting Balance unsupported;sitting in chair  -KG     Static Standing Balance contact guard  -KG     Dynamic Standing Balance contact  guard  -KG     Position/Device Used, Standing Balance supported  -KG           User Key  (r) = Recorded By, (t) = Taken By, (c) = Cosigned By    Initials Name Provider Type    Christiana Hightower PT Physical Therapist               Goals/Plan    No documentation.                Clinical Impression     Row Name 11/03/22 1115          Pain    Pretreatment Pain Rating 0/10 - no pain  -KG     Posttreatment Pain Rating 3/10  -KG     Pain Location incisional  -KG     Pain Location - chest  -KG     Pain Intervention(s) Repositioned;Ambulation/increased activity  -KG     Row Name 11/03/22 1115          Plan of Care Review    Plan of Care Reviewed With patient  -KG     Progress improving  -KG     Outcome Evaluation Pt increased ambulation distance to 220ft with CGA and B UE support. Pt demonstrated good balance and stability with improved sheridan. Ambulation distance limited by fatigue. Continue to progress as appropriate.  -KG     Row Name 11/03/22 1115          Vital Signs    Pre Systolic BP Rehab 96  -KG     Pre Treatment Diastolic BP 68  -KG     Post Systolic BP Rehab 104  -KG     Post Treatment Diastolic BP 63  -KG     Pretreatment Heart Rate (beats/min) 80  -KG     Posttreatment Heart Rate (beats/min) 81  -KG     Pre SpO2 (%) 93  -KG     O2 Delivery Pre Treatment room air  -KG     Post SpO2 (%) 94  -KG     O2 Delivery Post Treatment room air  -KG     Pre Patient Position Sitting  -KG     Intra Patient Position Standing  -KG     Post Patient Position Sitting  -KG     Row Name 11/03/22 1115          Positioning and Restraints    Pre-Treatment Position sitting in chair/recliner  -KG     Post Treatment Position chair  -KG     In Chair notified nsg;sitting;call light within reach;encouraged to call for assist  -KG           User Key  (r) = Recorded By, (t) = Taken By, (c) = Cosigned By    Initials Name Provider Type    Christiana Hightower, PT Physical Therapist               Outcome Measures     Row Name 11/03/22  1118          How much help from another person do you currently need...    Turning from your back to your side while in flat bed without using bedrails? 3  -KG     Moving from lying on back to sitting on the side of a flat bed without bedrails? 2  -KG     Moving to and from a bed to a chair (including a wheelchair)? 3  -KG     Standing up from a chair using your arms (e.g., wheelchair, bedside chair)? 3  -KG     Climbing 3-5 steps with a railing? 3  -KG     To walk in hospital room? 3  -KG     AM-PAC 6 Clicks Score (PT) 17  -KG     Highest level of mobility 5 --> Static standing  -KG     Row Name 11/03/22 1118          Functional Assessment    Outcome Measure Options AM-PAC 6 Clicks Basic Mobility (PT)  -KG           User Key  (r) = Recorded By, (t) = Taken By, (c) = Cosigned By    Initials Name Provider Type    KG Christiana Rueda, PT Physical Therapist                             Physical Therapy Education     Title: PT OT SLP Therapies (In Progress)     Topic: Physical Therapy (In Progress)     Point: Mobility training (In Progress)     Learning Progress Summary           Patient Acceptance, E, NR by KG at 11/3/2022 0817    Acceptance, E, NR by KG at 11/2/2022 1113                   Point: Home exercise program (In Progress)     Learning Progress Summary           Patient Acceptance, E, NR by KG at 11/3/2022 0817                   Point: Body mechanics (In Progress)     Learning Progress Summary           Patient Acceptance, E, NR by KG at 11/3/2022 0817    Acceptance, E, NR by KG at 11/2/2022 1113                   Point: Precautions (In Progress)     Learning Progress Summary           Patient Acceptance, E, NR by KG at 11/3/2022 0817    Acceptance, E, NR by KG at 11/2/2022 1113                               User Key     Initials Effective Dates Name Provider Type Discipline    KG 05/22/20 -  Christiana Rueda, PT Physical Therapist PT              PT Recommendation and Plan  Planned Therapy  Interventions (PT): balance training, bed mobility training, gait training, stair training, strengthening, transfer training  Plan of Care Reviewed With: patient  Progress: improving  Outcome Evaluation: Pt increased ambulation distance to 220ft with CGA and B UE support. Pt demonstrated good balance and stability with improved sheridan. Ambulation distance limited by fatigue. Continue to progress as appropriate.     Time Calculation:    PT Charges     Row Name 11/03/22 0817             Time Calculation    Start Time 0817  -KG      PT Received On 11/03/22  -KG      PT Goal Re-Cert Due Date 11/12/22  -KG         Time Calculation- PT    Total Timed Code Minutes- PT 23 minute(s)  -KG         Timed Charges    50449 - PT Therapeutic Activity Minutes 23  -KG         Total Minutes    Timed Charges Total Minutes 23  -KG       Total Minutes 23  -KG            User Key  (r) = Recorded By, (t) = Taken By, (c) = Cosigned By    Initials Name Provider Type    KG Christiana Rueda, PT Physical Therapist              Therapy Charges for Today     Code Description Service Date Service Provider Modifiers Qty    17694105478 HC PT EVAL MOD COMPLEXITY 4 11/2/2022 Christiana Rueda, PT GP 1    [id number]  PT THERAPEUTIC ACT EA 15 MIN 11/3/2022 Christiana Rueda, PT GP 2          PT G-Codes  Outcome Measure Options: AM-PAC 6 Clicks Basic Mobility (PT)  AM-PAC 6 Clicks Score (PT): 17    Mag Rueda PT  11/3/2022

## 2022-11-03 NOTE — PROGRESS NOTES
Pt. Referred for Phase II Cardiac Rehab. Staff discussed benefits of exercise, program protocol, and educational material provided. Teach back verified.  Permission granted from patient for staff to fax referral information to outlying program at this time.  Staff faxed referral info to [facility].

## 2022-11-03 NOTE — PROGRESS NOTES
Pulmonary/Critical Care Follow-up     LOS: 7 days   Patient Care Team:  Bobby Kiser FNP as PCP - General (Nurse Practitioner)  Micky Irby MD as Consulting Physician (Endocrinology)    Chief Complaint: Chest pain/medical management of issues postoperatively after CABG including but not limited to: Respiratory, electrolyte, and glycemic management.      Subjective     Interval History:   Tawanna Huang is a 42 y.o. female with PMH of T2DM, thyroid CA s/p thyroidectomy, tachy-gilma syndrome s/p PPM admitted to [facility] on 10/27 from [facility] for NSTEMI and CABG evaluation.     The patient presented to the OSH on 10/21 with complaints of intermittent chest pain with acute worsening prompting ED evaluation and EKG consistent with NSTEMI. LHC displayed >90% LAD stenosis with an EF of 50%. She was transferred to [facility] and evaluated by CTS who recommended surgical revascularization and underwent pre-operative work-up. She has been maintained on a Heparin gtt and ASA. TTE results are pending.     UDS + THC.     CXR shows reduced lung volumes. Pre-op PFTs as follows: FVC 2.36, 64%; FEV1 2.05, 69%; and ratio 0.87.     She underwent CABG x1 (LIMA to LAD) with left atrial appendage ligation, by Dr. Lane on 11/1/2022.     Interval history:  Patient doing well.  Remains on insulin drip at lower levels.  Afebrile.  Pain adequately controlled.  Denies dyspnea.  No new complaints.  Ate breakfast.  Ambulated with physical therapy.  Chest tubes out this morning.    History taken from: chart     PMH/FH/Social History were reviewed and updated appropriately in the electronic medical record.     Review of Systems:    Review of 14 systems was completed with positives and pertinent negatives noted in the subjective section.  All other systems reviewed and are negative.         Objective     Vital Signs  Temp:  [96.7 °F (35.9 °C)-98.4 °F (36.9 °C)] 96.7 °F (35.9 °C)  Heart Rate:  [75-88] 75  Resp:  [20-35] 22  BP:  ()/(53-88) 88/53 11/02 0701 - 11/03 0700  In: 2787 [P.O.:1925; I.V.:650]  Out: 1045 [Urine:770]  Body mass index is 39.08 kg/m².     Physical Exam:     Constitutional:   Awake and alert, in no acute distress   Head:    Normocephalic, without obvious abnormality, atraumatic   Eyes:            Lids and lashes normal   ENMT:   Ears appear intact with no abnormalities noted       Neck:   No adenopathy, trachea midline, right internal jugular catheter in place       Lungs/Resp:    Normal effort, symmetric chest rise, no crepitus, diminished breath sounds diffusely and bilaterally, left upper chest pacemaker.                Heart/CV:    Regular rhythm and normal rate, normal S1 and S2, no            murmur   Abdomen/GI:     Normal bowel sounds, no masses, no organomegaly, soft,        non-tender, non-distended   :     Deferred   Extremities/MSK:   No clubbing or cyanosis.  No edema.  Normal tone.  No deformities.    Pulses:   Pulses palpable and equal bilaterally   Skin:   No bleeding, bruising or rash   Heme/Lymph:   No cervical or supraclavicular adenopathy.    Neurologic:    Psychiatric:     Moves all extremities without obvious focal motor deficit.  Awake and follows commands.  Nonagitated.         Above findings represent my personal examination findings from today.  Electronically signed by:Hiren Hays MD 11/03/22 11:50 EDT     Results Review:     I reviewed the patient's new clinical results.   Results from last 7 days   Lab Units 11/03/22  0243 11/02/22  0235 11/01/22  2133 11/01/22  1034 11/01/22  0657   SODIUM mmol/L 132* 137 139   < > 137   POTASSIUM mmol/L 4.7 4.8 4.9   < > 4.7   CHLORIDE mmol/L 97* 103 105   < > 99   CO2 mmol/L 22.0 20.0* 22.0   < > 26.0   BUN mg/dL 16 13 14   < > 15   CREATININE mg/dL 0.80 0.72 0.74   < > 0.72   CALCIUM mg/dL 9.0 9.3 9.9   < > 8.8   BILIRUBIN mg/dL 0.3 0.4  --   --  0.4   ALK PHOS U/L 65 64  --   --  87   ALT (SGPT) U/L 13 19  --   --  22   AST (SGOT) U/L 19  31  --   --  14   GLUCOSE mg/dL 119* 191* 174*   < > 273*    < > = values in this interval not displayed.     Results from last 7 days   Lab Units 11/03/22 0243 11/02/22 0235 11/01/22 2133   WBC 10*3/mm3 24.79* 26.41* 20.71*   HEMOGLOBIN g/dL 11.6* 12.2 12.0   HEMATOCRIT % 35.2 37.2 36.7   PLATELETS 10*3/mm3 115* 137* 81*     Results from last 7 days   Lab Units 11/01/22  2310   PH, ARTERIAL pH units 7.310*   PO2 ART mm Hg 118.0*   PCO2, ARTERIAL mm Hg 46.4*   HCO3 ART mmol/L 23.3     Results from last 7 days   Lab Units 11/03/22 0243 11/02/22 0235 11/01/22 2133 11/01/22  1753   MAGNESIUM mg/dL 1.9 2.4  --  1.8   PHOSPHORUS mg/dL  --  6.1* 6.0* 4.4     Results from last 7 days   Lab Units 10/27/22  2012   HEMOGLOBIN A1C % 11.80*         I reviewed the patient's new imaging including images and reports.  Chest x-ray 11/2/2022: Interval extubation.  Persistent low lung volumes and basilar atelectasis.  Postoperative changes without obvious pneumothorax.     Medication Review:   acetaminophen, 650 mg, Oral, Q8H  aspirin, 325 mg, Oral, Daily  atorvastatin, 40 mg, Oral, Nightly  famotidine, 20 mg, Oral, BID AC  gabapentin, 100 mg, Oral, Q8H  heparin (porcine), 5,000 Units, Subcutaneous, Q8H  insulin detemir, 30 Units, Subcutaneous, Daily  insulin lispro, 0-14 Units, Subcutaneous, 4x Daily With Meals & Nightly  Insulin Lispro, 8 Units, Subcutaneous, TID With Meals  levothyroxine, 175 mcg, Oral, Q AM  metoprolol tartrate, 12.5 mg, Oral, Q12H  pharmacy consult - [place], , Does not apply, Daily  senna-docusate sodium, 2 tablet, Oral, BID  sodium chloride, 10 mL, Intravenous, Q12H      Scopolamine, 1 patch        Assessment & Plan       MV CAD     Tachy-gilma syndrome s/p PPM     Acquired hypothyroidism- H/O thyroid cancer s/p thryoidectomy 2012    T2DM on insulin and metformin     Class 2 obesity in adult    Constipation    Marijuana use (UDS + THC)     42 y.o. female with history of poorly controlled diabetes (hemoglobin  A1c 11.8% 10/27/2022), thyroid cancer status post thyroidectomy, tachybradycardia syndrome status post permanent pacemaker, admitted to [facility] on 10/[facility] from [facility] after NSTEMI for CABG evaluation.  She was found to have significant LAD disease with proximal disease not amenable to stent.  Urine drug screen was positive for THC.    Patient underwent CABG x1 with left atrial appendage clip by Dr. Lane on 11/1/2022.  Doing well from a respiratory standpoint.  Chest tubes out.  Remains on insulin drip.  Tolerating diet.  We will transition insulin drip today.  Okay to telemetry from pulmonary standpoint.    Plan:  1.  For coronary artery disease: Status post CABG x1 on 11/1/2022: Aspirin, statin, beta-blocker as tolerated.  Postoperative surgical management per CT surgery.  Cardiology following.  2.  For uncontrolled diabetes: Insulin drip protocol for now.  Transition to subcutaneous protocol with basal plus prandial and correction insulin.   3.  For acquired hypothyroidism: Continue levothyroxine.  Hospitalist had reduced dose from 200 mcg 175 mcg and we will continue this with plan to follow-up with PCP in 6 weeks with repeat labs.  4.  DVT prophylaxis: Limit to mechanical only while mediastinal tubes are in place.  5.  Respiratory: Supplemental oxygen as needed.  Encourage incentive spirometer use.  Mobilize.    6.  We will follow while in the intensive care unit.  Okay to telemetry when okay with CT surgery.      Electronically signed by:  Hiren Hays MD  11/03/22  11:50 EDT

## 2022-11-04 LAB
ALBUMIN SERPL-MCNC: 3.6 G/DL (ref 3.5–5.2)
ALBUMIN/GLOB SERPL: 1.2 G/DL
ALP SERPL-CCNC: 70 U/L (ref 39–117)
ALT SERPL W P-5'-P-CCNC: 8 U/L (ref 1–33)
ANION GAP SERPL CALCULATED.3IONS-SCNC: 10 MMOL/L (ref 5–15)
AST SERPL-CCNC: 15 U/L (ref 1–32)
BASOPHILS # BLD AUTO: 0.07 10*3/MM3 (ref 0–0.2)
BASOPHILS NFR BLD AUTO: 0.4 % (ref 0–1.5)
BH BB BLOOD EXPIRATION DATE: NORMAL
BH BB BLOOD EXPIRATION DATE: NORMAL
BH BB BLOOD TYPE BARCODE: 8400
BH BB BLOOD TYPE BARCODE: 8400
BH BB DISPENSE STATUS: NORMAL
BH BB DISPENSE STATUS: NORMAL
BH BB PRODUCT CODE: NORMAL
BH BB PRODUCT CODE: NORMAL
BH BB UNIT NUMBER: NORMAL
BH BB UNIT NUMBER: NORMAL
BILIRUB SERPL-MCNC: 0.3 MG/DL (ref 0–1.2)
BUN SERPL-MCNC: 21 MG/DL (ref 6–20)
BUN/CREAT SERPL: 25.3 (ref 7–25)
CALCIUM SPEC-SCNC: 8.6 MG/DL (ref 8.6–10.5)
CHLORIDE SERPL-SCNC: 95 MMOL/L (ref 98–107)
CO2 SERPL-SCNC: 24 MMOL/L (ref 22–29)
CREAT SERPL-MCNC: 0.83 MG/DL (ref 0.57–1)
CROSSMATCH INTERPRETATION: NORMAL
CROSSMATCH INTERPRETATION: NORMAL
DEPRECATED RDW RBC AUTO: 44 FL (ref 37–54)
EGFRCR SERPLBLD CKD-EPI 2021: 90.4 ML/MIN/1.73
EOSINOPHIL # BLD AUTO: 0.07 10*3/MM3 (ref 0–0.4)
EOSINOPHIL NFR BLD AUTO: 0.4 % (ref 0.3–6.2)
ERYTHROCYTE [DISTWIDTH] IN BLOOD BY AUTOMATED COUNT: 13.7 % (ref 12.3–15.4)
GLOBULIN UR ELPH-MCNC: 3.1 GM/DL
GLUCOSE BLDC GLUCOMTR-MCNC: 164 MG/DL (ref 70–130)
GLUCOSE BLDC GLUCOMTR-MCNC: 208 MG/DL (ref 70–130)
GLUCOSE BLDC GLUCOMTR-MCNC: 212 MG/DL (ref 70–130)
GLUCOSE BLDC GLUCOMTR-MCNC: 229 MG/DL (ref 70–130)
GLUCOSE SERPL-MCNC: 212 MG/DL (ref 65–99)
HCT VFR BLD AUTO: 33.2 % (ref 34–46.6)
HGB BLD-MCNC: 10.7 G/DL (ref 12–15.9)
IMM GRANULOCYTES # BLD AUTO: 0.19 10*3/MM3 (ref 0–0.05)
IMM GRANULOCYTES NFR BLD AUTO: 1.2 % (ref 0–0.5)
LYMPHOCYTES # BLD AUTO: 1.67 10*3/MM3 (ref 0.7–3.1)
LYMPHOCYTES NFR BLD AUTO: 10.3 % (ref 19.6–45.3)
MAGNESIUM SERPL-MCNC: 2.1 MG/DL (ref 1.6–2.6)
MCH RBC QN AUTO: 28.3 PG (ref 26.6–33)
MCHC RBC AUTO-ENTMCNC: 32.2 G/DL (ref 31.5–35.7)
MCV RBC AUTO: 87.8 FL (ref 79–97)
MONOCYTES # BLD AUTO: 1.36 10*3/MM3 (ref 0.1–0.9)
MONOCYTES NFR BLD AUTO: 8.4 % (ref 5–12)
NEUTROPHILS NFR BLD AUTO: 12.87 10*3/MM3 (ref 1.7–7)
NEUTROPHILS NFR BLD AUTO: 79.3 % (ref 42.7–76)
NRBC BLD AUTO-RTO: 0 /100 WBC (ref 0–0.2)
PLATELET # BLD AUTO: 95 10*3/MM3 (ref 140–450)
PMV BLD AUTO: 13.9 FL (ref 6–12)
POTASSIUM SERPL-SCNC: 4.2 MMOL/L (ref 3.5–5.2)
PROT SERPL-MCNC: 6.7 G/DL (ref 6–8.5)
RBC # BLD AUTO: 3.78 10*6/MM3 (ref 3.77–5.28)
SODIUM SERPL-SCNC: 129 MMOL/L (ref 136–145)
UNIT  ABO: NORMAL
UNIT  ABO: NORMAL
UNIT  RH: NORMAL
UNIT  RH: NORMAL
WBC NRBC COR # BLD: 16.23 10*3/MM3 (ref 3.4–10.8)

## 2022-11-04 PROCEDURE — 97530 THERAPEUTIC ACTIVITIES: CPT

## 2022-11-04 PROCEDURE — 25010000002 HEPARIN (PORCINE) PER 1000 UNITS: Performed by: INTERNAL MEDICINE

## 2022-11-04 PROCEDURE — 63710000001 INSULIN LISPRO (HUMAN) PER 5 UNITS: Performed by: INTERNAL MEDICINE

## 2022-11-04 PROCEDURE — 82962 GLUCOSE BLOOD TEST: CPT

## 2022-11-04 PROCEDURE — 63710000001 INSULIN DETEMIR PER 5 UNITS: Performed by: INTERNAL MEDICINE

## 2022-11-04 PROCEDURE — 83735 ASSAY OF MAGNESIUM: CPT | Performed by: INTERNAL MEDICINE

## 2022-11-04 PROCEDURE — 99232 SBSQ HOSP IP/OBS MODERATE 35: CPT | Performed by: INTERNAL MEDICINE

## 2022-11-04 PROCEDURE — 80053 COMPREHEN METABOLIC PANEL: CPT | Performed by: INTERNAL MEDICINE

## 2022-11-04 PROCEDURE — 85025 COMPLETE CBC W/AUTO DIFF WBC: CPT | Performed by: INTERNAL MEDICINE

## 2022-11-04 RX ORDER — BUMETANIDE 0.25 MG/ML
2 INJECTION INTRAMUSCULAR; INTRAVENOUS ONCE
Status: COMPLETED | OUTPATIENT
Start: 2022-11-04 | End: 2022-11-04

## 2022-11-04 RX ORDER — INSULIN LISPRO 100 [IU]/ML
10 INJECTION, SOLUTION INTRAVENOUS; SUBCUTANEOUS
Status: DISCONTINUED | OUTPATIENT
Start: 2022-11-04 | End: 2022-11-06 | Stop reason: HOSPADM

## 2022-11-04 RX ADMIN — ASPIRIN 81 MG: 81 TABLET, COATED ORAL at 09:05

## 2022-11-04 RX ADMIN — INSULIN LISPRO 5 UNITS: 100 INJECTION, SOLUTION INTRAVENOUS; SUBCUTANEOUS at 20:45

## 2022-11-04 RX ADMIN — CLOPIDOGREL BISULFATE 75 MG: 75 TABLET ORAL at 09:05

## 2022-11-04 RX ADMIN — ACETAMINOPHEN 650 MG: 325 TABLET, FILM COATED ORAL at 02:56

## 2022-11-04 RX ADMIN — SENNOSIDES AND DOCUSATE SODIUM 2 TABLET: 50; 8.6 TABLET ORAL at 09:05

## 2022-11-04 RX ADMIN — INSULIN LISPRO 8 UNITS: 100 INJECTION, SOLUTION INTRAVENOUS; SUBCUTANEOUS at 11:30

## 2022-11-04 RX ADMIN — INSULIN LISPRO 5 UNITS: 100 INJECTION, SOLUTION INTRAVENOUS; SUBCUTANEOUS at 11:30

## 2022-11-04 RX ADMIN — ACETAMINOPHEN 650 MG: 325 TABLET, FILM COATED ORAL at 11:31

## 2022-11-04 RX ADMIN — FAMOTIDINE 20 MG: 20 TABLET ORAL at 16:39

## 2022-11-04 RX ADMIN — HEPARIN SODIUM 5000 UNITS: 5000 INJECTION INTRAVENOUS; SUBCUTANEOUS at 06:28

## 2022-11-04 RX ADMIN — FAMOTIDINE 20 MG: 20 TABLET ORAL at 06:30

## 2022-11-04 RX ADMIN — GABAPENTIN 100 MG: 100 CAPSULE ORAL at 11:31

## 2022-11-04 RX ADMIN — ACETAMINOPHEN 650 MG: 325 TABLET, FILM COATED ORAL at 18:13

## 2022-11-04 RX ADMIN — INSULIN DETEMIR 30 UNITS: 100 INJECTION, SOLUTION SUBCUTANEOUS at 09:03

## 2022-11-04 RX ADMIN — HEPARIN SODIUM 5000 UNITS: 5000 INJECTION INTRAVENOUS; SUBCUTANEOUS at 13:22

## 2022-11-04 RX ADMIN — INSULIN LISPRO 5 UNITS: 100 INJECTION, SOLUTION INTRAVENOUS; SUBCUTANEOUS at 18:13

## 2022-11-04 RX ADMIN — INSULIN LISPRO 10 UNITS: 100 INJECTION, SOLUTION INTRAVENOUS; SUBCUTANEOUS at 18:13

## 2022-11-04 RX ADMIN — HEPARIN SODIUM 5000 UNITS: 5000 INJECTION INTRAVENOUS; SUBCUTANEOUS at 21:39

## 2022-11-04 RX ADMIN — Medication 10 ML: at 20:45

## 2022-11-04 RX ADMIN — INSULIN LISPRO 3 UNITS: 100 INJECTION, SOLUTION INTRAVENOUS; SUBCUTANEOUS at 09:03

## 2022-11-04 RX ADMIN — ATORVASTATIN CALCIUM 40 MG: 40 TABLET, FILM COATED ORAL at 20:34

## 2022-11-04 RX ADMIN — OXYCODONE 10 MG: 5 TABLET ORAL at 21:26

## 2022-11-04 RX ADMIN — GABAPENTIN 100 MG: 100 CAPSULE ORAL at 20:34

## 2022-11-04 RX ADMIN — GABAPENTIN 100 MG: 100 CAPSULE ORAL at 03:03

## 2022-11-04 RX ADMIN — LEVOTHYROXINE SODIUM 175 MCG: 150 TABLET ORAL at 06:29

## 2022-11-04 RX ADMIN — OXYCODONE 10 MG: 5 TABLET ORAL at 13:12

## 2022-11-04 RX ADMIN — BUMETANIDE 2 MG: 0.25 INJECTION, SOLUTION INTRAMUSCULAR; INTRAVENOUS at 20:34

## 2022-11-04 RX ADMIN — EMPAGLIFLOZIN 10 MG: 10 TABLET, FILM COATED ORAL at 09:16

## 2022-11-04 RX ADMIN — METOPROLOL TARTRATE 12.5 MG: 25 TABLET, FILM COATED ORAL at 20:34

## 2022-11-04 RX ADMIN — Medication 10 ML: at 09:07

## 2022-11-04 RX ADMIN — INSULIN LISPRO 8 UNITS: 100 INJECTION, SOLUTION INTRAVENOUS; SUBCUTANEOUS at 09:03

## 2022-11-04 NOTE — THERAPY TREATMENT NOTE
Patient Name: Tawanna Huang  : 1980    MRN: [id number]                              Today's Date: 2022       Admit Date: 10/27/2022    Visit Dx:     ICD-10-CM ICD-9-CM   1. Coronary artery disease involving native coronary artery of native heart with unstable angina pectoris (HCC)  I25.110 414.01     411.1   2. Encounter for examination for normal comparison and control in clinical research program  Z00.6 V70.7     Patient Active Problem List   Diagnosis   • Tachy-gilma syndrome s/p PPM    • Enlarged liver   • Acquired hypothyroidism- H/O thyroid cancer s/p thryoidectomy    • T2DM on insulin and metformin    • Class 2 obesity in adult   • Chronic nonintractable headache   • Irregular menses   • Fatty liver   • Vitamin D deficiency   • MV CAD    • Constipation   • Marijuana use (UDS + THC)      Past Medical History:   Diagnosis Date   • Enlarged liver    • Irregular heart beat    • Pacemaker    • Thyroid cancer (HCC)     papillary s/p thyroidectomy and radioactive iodine   • Type 2 diabetes mellitus (HCC)      Past Surgical History:   Procedure Laterality Date   • CORONARY ARTERY BYPASS GRAFT N/A 2022    Procedure: MEDIAN STERNOTOMY, CORONARY ARTERY BYPASS UTILIZING THE LEFT INTERNAL MAMMARY ARTERY GRAFT, LEFT ATRIAL APPENDAGE LIGATION, GEETA;  Surgeon: Bahman Lane MD;  Location: [facility];  Service: Cardiothoracic;  Laterality: N/A;   • PACEMAKER IMPLANTATION     • THYROIDECTOMY  2012      General Information     Row Name 22 1423          Physical Therapy Time and Intention    Document Type therapy note (daily note)  -KG     Mode of Treatment physical therapy  -KG     Row Name 22 1423          General Information    Existing Precautions/Restrictions cardiac;fall;sternal  -KG     Row Name 22 1423          Cognition    Orientation Status (Cognition) oriented x 4  -KG     Row Name 22 1423          Safety Issues, Functional Mobility    Safety Issues Affecting  Function (Mobility) safety precaution awareness;safety precautions follow-through/compliance  -KG     Impairments Affecting Function (Mobility) endurance/activity tolerance;strength  -KG           User Key  (r) = Recorded By, (t) = Taken By, (c) = Cosigned By    Initials Name Provider Type    Christiana Hightower PT Physical Therapist               Mobility     Row Name 11/04/22 1423          Bed Mobility    Comment, (Bed Mobility) UIC  -KG     Row Name 11/04/22 1423          Transfers    Comment, (Transfers) VC's for sequencing and safe hand placement to maintain sternal precautions.  -KG     Row Name 11/04/22 1423          Sit-Stand Transfer    Sit-Stand Nelson (Transfers) contact guard;verbal cues  -KG     Row Name 11/04/22 1423          Gait/Stairs (Locomotion)    Nelson Level (Gait) contact guard;verbal cues  -KG     Assistive Device (Gait) other (see comments)  B UE support on tele monitor  -KG     Distance in Feet (Gait) 220  -KG     Deviations/Abnormal Patterns (Gait) sheridan decreased;stride length decreased  -KG     Bilateral Gait Deviations forward flexed posture  -KG     Comment, (Gait/Stairs) Pt demonstrated step through gait pattern with slow sheridan and decreased step length. Pt demonstrated good balance and stability throughout ambulation. Declined additional ambulation despite encouragement. Limited by fatigue.  -KG           User Key  (r) = Recorded By, (t) = Taken By, (c) = Cosigned By    Initials Name Provider Type    Christiana Hightower PT Physical Therapist               Obj/Interventions     Row Name 11/04/22 1424          Balance    Balance Assessment sitting static balance;standing static balance;standing dynamic balance  -KG     Static Sitting Balance independent  -KG     Position, Sitting Balance unsupported;sitting in chair  -KG     Static Standing Balance supervision  -KG     Dynamic Standing Balance contact guard  -KG     Position/Device Used, Standing Balance  supported  -KG           User Key  (r) = Recorded By, (t) = Taken By, (c) = Cosigned By    Initials Name Provider Type    Christiana Hightower, PT Physical Therapist               Goals/Plan    No documentation.                Clinical Impression     Row Name 11/04/22 1424          Pain    Pretreatment Pain Rating 0/10 - no pain  -KG     Posttreatment Pain Rating 0/10 - no pain  -KG     Row Name 11/04/22 1424          Plan of Care Review    Plan of Care Reviewed With patient  -KG     Progress improving  -KG     Outcome Evaluation Pt ambulated 220ft with CGA and B UE support on tele monitor. Pt demonstrated good balance and stability throughout ambulation. Denied any c/o pain. Pt declined additional ambulation. Limited by fatigue. Continue to progress as appropriate.  -KG     Row Name 11/04/22 1424          Vital Signs    Pre Systolic BP Rehab 112  -KG     Pre Treatment Diastolic BP 69  -KG     Post Systolic BP Rehab 120  -KG     Post Treatment Diastolic BP 78  -KG     Pretreatment Heart Rate (beats/min) 80  -KG     Posttreatment Heart Rate (beats/min) 85  -KG     Pre SpO2 (%) 96  -KG     O2 Delivery Pre Treatment room air  -KG     Post SpO2 (%) 96  -KG     O2 Delivery Post Treatment room air  -KG     Pre Patient Position Sitting  -KG     Intra Patient Position Standing  -KG     Post Patient Position Sitting  -KG     Row Name 11/04/22 1424          Positioning and Restraints    Pre-Treatment Position sitting in chair/recliner  -KG     Post Treatment Position chair  -KG     In Chair notified nsg;sitting;call light within reach;encouraged to call for assist;with family/caregiver  -KG           User Key  (r) = Recorded By, (t) = Taken By, (c) = Cosigned By    Initials Name Provider Type    Christiana Hightower, PT Physical Therapist               Outcome Measures     Row Name 11/04/22 1427          How much help from another person do you currently need...    Turning from your back to your side while in flat bed  without using bedrails? 3  -KG     Moving from lying on back to sitting on the side of a flat bed without bedrails? 3  -KG     Moving to and from a bed to a chair (including a wheelchair)? 3  -KG     Standing up from a chair using your arms (e.g., wheelchair, bedside chair)? 3  -KG     Climbing 3-5 steps with a railing? 3  -KG     To walk in hospital room? 3  -KG     AM-PAC 6 Clicks Score (PT) 18  -KG     Highest level of mobility 6 --> Walked 10 steps or more  -KG     Row Name 11/04/22 1425          Functional Assessment    Outcome Measure Options AM-PAC 6 Clicks Basic Mobility (PT)  -KG           User Key  (r) = Recorded By, (t) = Taken By, (c) = Cosigned By    Initials Name Provider Type    Christiana Hightower, PT Physical Therapist                             Physical Therapy Education     Title: PT OT SLP Therapies (Done)     Topic: Physical Therapy (Done)     Point: Mobility training (Done)     Learning Progress Summary           Patient Acceptance, E, NR,VU by KG at 11/4/2022 1128    Acceptance, E, NR by KG at 11/3/2022 0817    Acceptance, E, NR by KG at 11/2/2022 1113                   Point: Home exercise program (Done)     Learning Progress Summary           Patient Acceptance, E, NR,VU by KG at 11/4/2022 1128    Acceptance, E, NR by KG at 11/3/2022 0817                   Point: Body mechanics (Done)     Learning Progress Summary           Patient Acceptance, E, NR,VU by KG at 11/4/2022 1128    Acceptance, E, NR by KG at 11/3/2022 0817    Acceptance, E, NR by KG at 11/2/2022 1113                   Point: Precautions (Done)     Learning Progress Summary           Patient Acceptance, E, NR,VU by KG at 11/4/2022 1128    Acceptance, E, NR by KG at 11/3/2022 0817    Acceptance, E, NR by KG at 11/2/2022 1113                               User Key     Initials Effective Dates Name Provider Type Discipline    KG 05/22/20 -  Chrisitana Rueda, PT Physical Therapist PT              PT Recommendation and  Plan  Planned Therapy Interventions (PT): balance training, bed mobility training, gait training, stair training, strengthening, transfer training  Plan of Care Reviewed With: patient  Progress: improving  Outcome Evaluation: Pt ambulated 220ft with CGA and B UE support on tele monitor. Pt demonstrated good balance and stability throughout ambulation. Denied any c/o pain. Pt declined additional ambulation. Limited by fatigue. Continue to progress as appropriate.     Time Calculation:    PT Charges     Row Name 11/04/22 1128             Time Calculation    Start Time 1128  -KG      PT Received On 11/04/22  -KG      PT Goal Re-Cert Due Date 11/12/22  -KG         Time Calculation- PT    Total Timed Code Minutes- PT 23 minute(s)  -KG         Timed Charges    88889 - PT Therapeutic Activity Minutes 23  -KG         Total Minutes    Timed Charges Total Minutes 23  -KG       Total Minutes 23  -KG            User Key  (r) = Recorded By, (t) = Taken By, (c) = Cosigned By    Initials Name Provider Type    KG Christiana Rueda, PT Physical Therapist              Therapy Charges for Today     Code Description Service Date Service Provider Modifiers Qty    63623907718 HC PT THERAPEUTIC ACT EA 15 MIN 11/3/2022 Christiana Rueda, PT GP 2    18125252636 HC PT THERAPEUTIC ACT EA 15 MIN 11/4/2022 Christiana Rueda, PT GP 2          PT G-Codes  Outcome Measure Options: AM-PAC 6 Clicks Basic Mobility (PT)  AM-PAC 6 Clicks Score (PT): 18    Mag Rueda PT  11/4/2022

## 2022-11-04 NOTE — PROGRESS NOTES
Pulmonary/Critical Care Follow-up     LOS: 8 days   Patient Care Team:  Bobby Kiser FNP as PCP - General (Nurse Practitioner)  Micky Irby MD as Consulting Physician (Endocrinology)    Chief Complaint: Chest pain/medical management of issues postoperatively after CABG including but not limited to: Respiratory, electrolyte, and glycemic management.      Subjective     Interval History:   Tawanna Huang is a 42 y.o. female with PMH of T2DM, thyroid CA s/p thyroidectomy, tachy-gilma syndrome s/p PPM admitted to [facility] on 10/27 from [facility] for NSTEMI and CABG evaluation.     The patient presented to the OSH on 10/21 with complaints of intermittent chest pain with acute worsening prompting ED evaluation and EKG consistent with NSTEMI. LHC displayed >90% LAD stenosis with an EF of 50%. She was transferred to [facility] and evaluated by CTS who recommended surgical revascularization and underwent pre-operative work-up. She has been maintained on a Heparin gtt and ASA. TTE results are pending.     UDS + THC.     CXR shows reduced lung volumes. Pre-op PFTs as follows: FVC 2.36, 64%; FEV1 2.05, 69%; and ratio 0.87.     She underwent CABG x1 (LIMA to LAD) with left atrial appendage ligation, by Dr. Lane on 11/1/2022.     Interval history:  Patient doing well.  Transition to subcutaneous insulin protocol with adequate blood sugars this morning.  Afebrile.  Pain adequately controlled.  Denies dyspnea.  No new complaints.  Ordered to telemetry.    History taken from: chart     PMH/FH/Social History were reviewed and updated appropriately in the electronic medical record.     Review of Systems:    Review of 14 systems was completed with positives and pertinent negatives noted in the subjective section.  All other systems reviewed and are negative.         Objective     Vital Signs  Temp:  [98.1 °F (36.7 °C)-99.2 °F (37.3 °C)] 98.3 °F (36.8 °C)  Heart Rate:  [73-86] 79  Resp:  [16-30] 20  BP: ()/(49-77)  114/70  11/03 0701 - 11/04 0700  In: 1965 [P.O.:1965]  Out: 2050 [Urine:2050]  Body mass index is 39.08 kg/m².     Physical Exam:     Constitutional:   Awake and alert, in no acute distress   Head:    Normocephalic, without obvious abnormality, atraumatic   Eyes:            Lids and lashes normal   ENMT:   Ears appear intact with no abnormalities noted       Neck:   No adenopathy, trachea midline, right internal jugular catheter in place       Lungs/Resp:    Normal effort, symmetric chest rise, no crepitus, clear bilaterally, left upper chest pacemaker.                Heart/CV:    Regular rhythm and normal rate, normal S1 and S2, no            murmur   Abdomen/GI:     Normal bowel sounds, no masses, no organomegaly, soft,        non-tender, non-distended   :     Deferred   Extremities/MSK:   No clubbing or cyanosis.  No edema.  Normal tone.  No deformities.    Pulses:   Pulses palpable and equal bilaterally   Skin:   No bleeding, bruising or rash   Heme/Lymph:   No cervical or supraclavicular adenopathy.    Neurologic:    Psychiatric:     Moves all extremities without obvious focal motor deficit.  Awake and follows commands.  Nonagitated.         Above findings represent my personal examination findings from today.  Electronically signed by:Hiren Hays MD 11/04/22 13:56 EDT     Results Review:     I reviewed the patient's new clinical results.   Results from last 7 days   Lab Units 11/04/22 0303 11/03/22 0243 11/02/22  0235   SODIUM mmol/L 129* 132* 137   POTASSIUM mmol/L 4.2 4.7 4.8   CHLORIDE mmol/L 95* 97* 103   CO2 mmol/L 24.0 22.0 20.0*   BUN mg/dL 21* 16 13   CREATININE mg/dL 0.83 0.80 0.72   CALCIUM mg/dL 8.6 9.0 9.3   BILIRUBIN mg/dL 0.3 0.3 0.4   ALK PHOS U/L 70 65 64   ALT (SGPT) U/L 8 13 19   AST (SGOT) U/L 15 19 31   GLUCOSE mg/dL 212* 119* 191*     Results from last 7 days   Lab Units 11/04/22 0303 11/03/22 0243 11/02/22  0235   WBC 10*3/mm3 16.23* 24.79* 26.41*   HEMOGLOBIN g/dL 10.7*  11.6* 12.2   HEMATOCRIT % 33.2* 35.2 37.2   PLATELETS 10*3/mm3 95* 115* 137*     Results from last 7 days   Lab Units 11/01/22  2310   PH, ARTERIAL pH units 7.310*   PO2 ART mm Hg 118.0*   PCO2, ARTERIAL mm Hg 46.4*   HCO3 ART mmol/L 23.3     Results from last 7 days   Lab Units 11/04/22  0303 11/03/22  0243 11/02/22  0235 11/01/22  2133 11/01/22  1753   MAGNESIUM mg/dL 2.1 1.9 2.4  --  1.8   PHOSPHORUS mg/dL  --   --  6.1* 6.0* 4.4             I reviewed the patient's new imaging including images and reports.  Chest x-ray 11/2/2022: Interval extubation.  Persistent low lung volumes and basilar atelectasis.  Postoperative changes without obvious pneumothorax.     Medication Review:   acetaminophen, 650 mg, Oral, Q8H  aspirin, 81 mg, Oral, Daily  atorvastatin, 40 mg, Oral, Nightly  clopidogrel, 75 mg, Oral, Daily  empagliflozin, 10 mg, Oral, Daily  famotidine, 20 mg, Oral, BID AC  gabapentin, 100 mg, Oral, Q8H  heparin (porcine), 5,000 Units, Subcutaneous, Q8H  insulin detemir, 30 Units, Subcutaneous, Daily  insulin lispro, 0-14 Units, Subcutaneous, 4x Daily With Meals & Nightly  Insulin Lispro, 8 Units, Subcutaneous, TID With Meals  levothyroxine, 175 mcg, Oral, Q AM  metoprolol tartrate, 12.5 mg, Oral, Q12H  pharmacy consult - [place], , Does not apply, Daily  senna-docusate sodium, 2 tablet, Oral, BID  sodium chloride, 10 mL, Intravenous, Q12H           Assessment & Plan       MV CAD     Tachy-gilma syndrome s/p PPM     Acquired hypothyroidism- H/O thyroid cancer s/p thryoidectomy 2012    T2DM on insulin and metformin     Class 2 obesity in adult    Constipation    Marijuana use (UDS + THC)     42 y.o. female with history of poorly controlled diabetes (hemoglobin A1c 11.8% 10/27/2022), thyroid cancer status post thyroidectomy, tachybradycardia syndrome status post permanent pacemaker, admitted to [facility] 10/27/[facility] from [facility] after NSTEMI for CABG evaluation.  She was found to have significant  LAD disease with proximal disease not amenable to stent.  Urine drug screen was positive for THC.    Patient underwent CABG x1 with left atrial appendage clip by Dr. Lane on 11/1/2022.  Doing well from a respiratory standpoint.  Chest tubes out.  Adequate blood sugars on subcutaneous protocol.  Tolerating diet.  Okay to telemetry from pulmonary standpoint.    Plan:  1.  For coronary artery disease: Status post CABG x1 on 11/1/2022: Aspirin, statin, beta-blocker as tolerated.  Postoperative surgical management per CT surgery.  Cardiology following.  2.  For uncontrolled diabetes: Continue subcutaneous protocol with basal plus prandial and correction insulin.   3.  For acquired hypothyroidism: Continue levothyroxine.  Hospitalist had reduced dose from 200 mcg 175 mcg and we will continue this with plan to follow-up with PCP in 6 weeks with repeat labs.  4.  DVT prophylaxis: Subcutaneous heparin.  5.  Respiratory: Supplemental oxygen as needed.  Encourage incentive spirometer use.  Mobilize.    6.  We will follow while in the intensive care unit.  Okay to telemetry when okay with CT surgery.      Electronically signed by:  Hiren Hays MD  11/04/22  13:56 EDT

## 2022-11-04 NOTE — PLAN OF CARE
Goal Outcome Evaluation:  Plan of Care Reviewed With: patient        Progress: improving  Outcome Evaluation: Pt ambulated 220ft with CGA and B UE support on tele monitor. Pt demonstrated good balance and stability throughout ambulation. Denied any c/o pain. Pt declined additional ambulation. Limited by fatigue. Continue to progress as appropriate.

## 2022-11-04 NOTE — PROGRESS NOTES
"                    Clinical Nutrition       Patient Name: Tawanna Huang  YOB: 1980  MRN: [id number]  Date of Encounter: 22 09:53 EDT  Admission date: 10/27/2022      Reason for Visit   LOS      EMR  Reviewed   Yes    Height: Height: 162.6 cm (64.02\")  Weight: Weight: 103 kg (227 lb 12.8 oz) (22 1133)  BMI: BMI (Calculated): 39.1    Problem:    MV CAD     Tachy-gilma syndrome s/p PPM     Acquired hypothyroidism- H/O thyroid cancer s/p thryoidectomy     T2DM on insulin and metformin     Class 2 obesity in adult    Constipation    Marijuana use (UDS + THC)     () s/p CABG  () extubated     Reported/Observed/Food/Nutrition Related - Comments   RD notes that patient had good PO intake prior to surgery () based on nsg doc. Since surgery, however, patient's PO intake hasn't been very good. Patient sitting up in chair during visit. Reports decreased appetite as well as irritated throat from intubation. Reports she ate 50% of breakfast this morning. Would like to try vanilla ONS drinks this admission (twice daily - breakfast/lunch). Denies food allergies.       Current Nutrition Prescription     Diet Regular; Cardiac, Consistent Carbohydrate    Average Intake from [place]% x 4 meals (solid food meals prior to surgery );  58% x 6 meals (clear liquid meals since surgery)      Nutrition Diagnosis     Problem Inadequate oral intake   Etiology Post-surgery mechanical ventilation, decreased appetite, irritated throat   Signs/Symptoms 58% x 6 meals (clear liquid meals since surgery )   Status:    Actions     Follow treatment progress, Care plan reviewed, Interview for preferences, Encourage intake, Supplement provided    -Ordered vanilla Premier Protein 2x daily.    Monitor Per Protocol      Ramya Moran RD  Time Spent: 30 min        "

## 2022-11-04 NOTE — PLAN OF CARE
Goal Outcome Evaluation:  Plan of Care Reviewed With: patient    Neuro- Patient AO x 4. Able to move all extremities without diff. Patient ambulated 220' this shift with standby assist and use of gait belt.     Respiratory- Patient noted to desaturate throughout the night while sleeping. Placed on 2 liters via N/C. O2 sat increased to 98%. Patient denies any difficulty breathing or SOB.     Cardiac- Patient remains in NSR at this time. SBP in the 90's to 112. Pulses palpable.     GI/- Abd soft non-tender with ABS + x 4 quads. 0 BM noted this shift. Patient voiding without diff. Remains on menses at this time.     Other- Patient medicated for pain x 1 this shift. Denies any pain at this time. MSI JS with 0 redness or swelling noted. 0 s/s of acute distress noted. Will cont to follow.

## 2022-11-04 NOTE — PAYOR COMM NOTE
"Yessy Huang (42 y.o. Female)     [facility]   phone: [phone]  fax: [phone]      Date of Birth   1980    Social Security Number       Address   [address] [place] [address]    Home Phone   [phone]    MRN   [id number]       Restoration   None    Marital Status                               Admission Date   10/27/22    Admission Type   Urgent    Admitting Provider   Hiren Hays MD    Attending Provider   Hiren Hays MD    Department, Room/Bed   [facility] 2HSI, S258/1       Discharge Date       Discharge Disposition       Discharge Destination                               Attending Provider: Hiren Hays MD    Allergies: Erythromycin    Isolation: None   Infection: None   Code Status: CPR    Ht: 162.6 cm (64.02\")   Wt: 103 kg (227 lb 12.8 oz)    Admission Cmt: None   Principal Problem: MV CAD  [I25.10]                 Active Insurance as of 10/27/2022     Primary Coverage     Payor Plan Insurance Group Employer/Plan Group    [employer] [facility] [employer] [place]      Payor Plan Address Payor Plan Phone Number Payor Plan Fax Number Effective Dates    [address]   2/1/2021 - None Entered    [facility] [id number]       Subscriber Name Subscriber Birth Date Member ID       YESSY HUANG 1980 [id number]                 Emergency Contacts      (Rel.) Home Phone Work Phone Mobile Phone    Merissa Ramos (Mother) -- -- [phone]            Lab Results (last 48 hours)     Procedure Component Value Units Date/Time    POC Glucose Once [[id number]]  (Abnormal) Collected: 11/04/22 0712    Specimen: Blood Updated: 11/04/22 0714     Glucose 164 mg/dL      Comment: Meter: [id number] : [id number] Thomas       CBC Auto Differential [[id number]]  (Abnormal) Collected: 11/04/22 0303    Specimen: Blood Updated: 11/04/22 0350     WBC 16.23 10*3/mm3      RBC 3.78 10*6/mm3      Hemoglobin 10.7 g/dL      Hematocrit 33.2 " %      MCV 87.8 fL      MCH 28.3 pg      MCHC 32.2 g/dL      RDW 13.7 %      RDW-SD 44.0 fl      MPV 13.9 fL      Platelets 95 10*3/mm3      Neutrophil % 79.3 %      Lymphocyte % 10.3 %      Monocyte % 8.4 %      Eosinophil % 0.4 %      Basophil % 0.4 %      Immature Grans % 1.2 %      Neutrophils, Absolute 12.87 10*3/mm3      Lymphocytes, Absolute 1.67 10*3/mm3      Monocytes, Absolute 1.36 10*3/mm3      Eosinophils, Absolute 0.07 10*3/mm3      Basophils, Absolute 0.07 10*3/mm3      Immature Grans, Absolute 0.19 10*3/mm3      nRBC 0.0 /100 WBC     Comprehensive Metabolic Panel [[id number]]  (Abnormal) Collected: 11/04/22 0303    Specimen: Blood Updated: 11/04/22 0349     Glucose 212 mg/dL      BUN 21 mg/dL      Creatinine 0.83 mg/dL      Sodium 129 mmol/L      Potassium 4.2 mmol/L      Chloride 95 mmol/L      CO2 24.0 mmol/L      Calcium 8.6 mg/dL      Total Protein 6.7 g/dL      Albumin 3.60 g/dL      ALT (SGPT) 8 U/L      AST (SGOT) 15 U/L      Alkaline Phosphatase 70 U/L      Total Bilirubin 0.3 mg/dL      Globulin 3.1 gm/dL      Comment: Calculated Result        A/G Ratio 1.2 g/dL      BUN/Creatinine Ratio 25.3     Anion Gap 10.0 mmol/L      eGFR 90.4 mL/min/1.73      Comment: National Kidney Foundation and American Society of Nephrology (ASN) Task Force recommended calculation based on the Chronic Kidney Disease Epidemiology Collaboration (CKD-EPI) equation refit without adjustment for race.       Narrative:      GFR Normal >60  Chronic Kidney Disease <60  Kidney Failure <15      Magnesium [[id number]]  (Normal) Collected: 11/04/22 0303    Specimen: Blood Updated: 11/04/22 0349     Magnesium 2.1 mg/dL     POC Glucose Once [[id number]]  (Abnormal) Collected: 11/03/22 1943    Specimen: Blood Updated: 11/03/22 2006     Glucose 238 mg/dL      Comment: Meter: [id number] : [id number] Pooja Hwang       POC Glucose Once [[id number]]  (Abnormal) Collected: 11/03/22 1612    Specimen: Blood Updated: 11/03/22 1617      Glucose 211 mg/dL      Comment: Meter: [id number] : [id number] Jose Stacie       POC Glucose Once [[id number]]  (Abnormal) Collected: 11/03/22 1108    Specimen: Blood Updated: 11/03/22 1110     Glucose 248 mg/dL      Comment: Meter: [id number] : [id number] Jose Stacie       POC Glucose Once [[id number]]  (Abnormal) Collected: 11/03/22 0935    Specimen: Blood Updated: 11/03/22 0936     Glucose 191 mg/dL      Comment: Meter: [id number] : [id number] Jose Stacie       POC Glucose Once [[id number]]  (Abnormal) Collected: 11/03/22 0716    Specimen: Blood Updated: 11/03/22 0724     Glucose 159 mg/dL      Comment: Meter: [id number] : [id number] Jose Stacie       POC Activated Clotting Time [[id number]]  (Normal) Collected: 11/01/22 1643    Specimen: Blood Updated: 11/03/22 0649     Activated Clotting Time  115 Seconds      Comment: Serial Number: [id number]:  [email or web address]       POC Activated Clotting Time [[id number]]  (Abnormal) Collected: 11/01/22 1611    Specimen: Blood Updated: 11/03/22 0649     Activated Clotting Time  451 Seconds      Comment: Serial Number: [id number]:  [email or web address]       POC Activated Clotting Time [[id number]]  (Abnormal) Collected: 11/01/22 1540    Specimen: Blood Updated: 11/03/22 0649     Activated Clotting Time  416 Seconds      Comment: Serial Number: [id number]:  [email or web address]       POC Activated Clotting Time [[id number]]  (Abnormal) Collected: 11/01/22 1518    Specimen: Blood Updated: 11/03/22 0649     Activated Clotting Time  405 Seconds      Comment: Serial Number: [id number]:  [email or web address]       POC Activated Clotting Time [[id number]]  (Normal) Collected: 11/01/22 1339    Specimen: Blood Updated: 11/03/22 0648     Activated Clotting Time  150 Seconds      Comment: Serial Number: [id number]:  CATT       POC Surgery Labs [[id number]]  (Abnormal) Collected: 11/01/22 1644    Specimen: Arterial Blood Updated: 11/03/22 0646     Ionized Calcium 1.47 mmol/L      POC Potassium 4.2  mmol/L      Sodium 136 mmol/L      Total CO2 25 mmol/L      Hemoglobin 10.2 g/dL      Hematocrit 30 %      pCO2, Arterial 34.7 mm Hg      pO2, Arterial 412 mmHg      Comment: Serial Number: [id number]:  [email or web address]        Base Excess 0.0000 mmol/L      O2 Saturation, Arterial 100 %      pH, Arterial 7.45 pH units      HCO3, Arterial 24.1 mmol/L      Glucose 198 mg/dL     POC Surgery Labs [[id number]]  (Abnormal) Collected: 11/01/22 1612    Specimen: Arterial Blood Updated: 11/03/22 0646     Ionized Calcium 1.03 mmol/L      POC Potassium 5.6 mmol/L      Sodium 133 mmol/L      Total CO2 29 mmol/L      Hemoglobin 10.5 g/dL      Hematocrit 31 %      pCO2, Arterial 46.2 mm Hg      pO2, Arterial 369 mmHg      Comment: Serial Number: [id number]:  [email or web address]        Base Excess 3.0000 mmol/L      O2 Saturation, Arterial 100 %      pH, Arterial 7.39 pH units      HCO3, Arterial 27.7 mmol/L      Glucose 221 mg/dL     POC Surgery Labs [[id number]]  (Abnormal) Collected: 11/01/22 1545    Specimen: Arterial Blood Updated: 11/03/22 0646     Ionized Calcium 0.95 mmol/L      POC Potassium 5.2 mmol/L      Sodium 133 mmol/L      Total CO2 30 mmol/L      Hemoglobin 9.9 g/dL      Hematocrit 29 %      pCO2, Arterial 47.7 mm Hg      pO2, Arterial 323 mmHg      Comment: Serial Number: [id number]:  [email or web address]        Base Excess 3.0000 mmol/L      O2 Saturation, Arterial 100 %      pH, Arterial 7.38 pH units      HCO3, Arterial 28.4 mmol/L      Glucose 208 mg/dL     POC Surgery Labs [[id number]]  (Abnormal) Collected: 11/01/22 1541    Specimen: Venous Blood Updated: 11/03/22 0646     Ionized Calcium 0.96 mmol/L      POC Potassium 6.2 mmol/L      Sodium 132 mmol/L      Total CO2 27 mmol/L      Hemoglobin 8.8 g/dL      Hematocrit 26 %      pCO2, Arterial 44.0 mm Hg      pO2, Arterial 40 mmHg      Comment: Serial Number: [id number]:  [email or web address]        Base Excess 0.0000 mmol/L      O2 Saturation, Arterial 73 %      pH, Arterial 7.37 pH  units      HCO3, Arterial 25.2 mmol/L      Glucose 196 mg/dL     POC Surgery Labs [[id number]]  (Abnormal) Collected: 11/01/22 1519    Specimen: Blood Updated: 11/03/22 0645     Ionized Calcium 1.09 mmol/L      POC Potassium 4.0 mmol/L      Sodium 136 mmol/L      Total CO2 24 mmol/L      Hemoglobin 11.2 g/dL      Hematocrit 33 %      pCO2, Arterial 33.2 mm Hg      pO2, Arterial 261 mmHg      Comment: Serial Number: [id number]:  [email or web address]        Base Excess -1.0000 mmol/L      O2 Saturation, Arterial 100 %      pH, Arterial 7.45 pH units      HCO3, Arterial 22.9 mmol/L      Glucose 201 mg/dL     POC Surgery Labs [[id number]]  (Abnormal) Collected: 11/01/22 1340    Specimen: Blood Updated: 11/03/22 0645     Ionized Calcium 1.19 mmol/L      POC Potassium 4.0 mmol/L      Sodium 139 mmol/L      Total CO2 27 mmol/L      Hemoglobin 12.9 g/dL      Hematocrit 38 %      pCO2, Arterial 45.7 mm Hg      pO2, Arterial 271 mmHg      Comment: Serial Number: [id number]:  [email or web address]        Base Excess 1.0000 mmol/L      O2 Saturation, Arterial 100 %      pH, Arterial 7.36 pH units      HCO3, Arterial 25.9 mmol/L      Glucose 170 mg/dL     POC Glucose Once [[id number]]  (Abnormal) Collected: 11/03/22 0617    Specimen: Blood Updated: 11/03/22 0619     Glucose 146 mg/dL      Comment: Meter: [id number] : [id number] Papito [employer]       POC Glucose Once [[id number]]  (Abnormal) Collected: 11/03/22 0453    Specimen: Blood Updated: 11/03/22 0503     Glucose 151 mg/dL      Comment: Meter: [id number] : [id number] Cobos [employer]       POC Glucose Once [[id number]]  (Normal) Collected: 11/03/22 0336    Specimen: Blood Updated: 11/03/22 0353     Glucose 123 mg/dL      Comment: Meter: [id number] : [id number] Papito Negrita       Comprehensive Metabolic Panel [[id number]]  (Abnormal) Collected: 11/03/22 0243    Specimen: Blood Updated: 11/03/22 0314     Glucose 119 mg/dL      BUN 16 mg/dL      Creatinine 0.80 mg/dL      Sodium 132  mmol/L      Potassium 4.7 mmol/L      Chloride 97 mmol/L      CO2 22.0 mmol/L      Calcium 9.0 mg/dL      Total Protein 7.3 g/dL      Albumin 4.20 g/dL      ALT (SGPT) 13 U/L      AST (SGOT) 19 U/L      Alkaline Phosphatase 65 U/L      Total Bilirubin 0.3 mg/dL      Globulin 3.1 gm/dL      Comment: Calculated Result        A/G Ratio 1.4 g/dL      BUN/Creatinine Ratio 20.0     Anion Gap 13.0 mmol/L      eGFR 94.5 mL/min/1.73      Comment: National Kidney Foundation and American Society of Nephrology (ASN) Task Force recommended calculation based on the Chronic Kidney Disease Epidemiology Collaboration (CKD-EPI) equation refit without adjustment for race.       Narrative:      GFR Normal >60  Chronic Kidney Disease <60  Kidney Failure <15      Magnesium [[id number]]  (Normal) Collected: 11/03/22 0243    Specimen: Blood Updated: 11/03/22 0314     Magnesium 1.9 mg/dL     CBC Auto Differential [[id number]]  (Abnormal) Collected: 11/03/22 0243    Specimen: Blood Updated: 11/03/22 0300     WBC 24.79 10*3/mm3      RBC 4.03 10*6/mm3      Hemoglobin 11.6 g/dL      Hematocrit 35.2 %      MCV 87.3 fL      MCH 28.8 pg      MCHC 33.0 g/dL      RDW 13.9 %      RDW-SD 44.1 fl      MPV 13.1 fL      Platelets 115 10*3/mm3      Neutrophil % 84.3 %      Lymphocyte % 5.4 %      Monocyte % 8.8 %      Eosinophil % 0.0 %      Basophil % 0.4 %      Immature Grans % 1.1 %      Neutrophils, Absolute 20.89 10*3/mm3      Lymphocytes, Absolute 1.33 10*3/mm3      Monocytes, Absolute 2.19 10*3/mm3      Eosinophils, Absolute 0.00 10*3/mm3      Basophils, Absolute 0.11 10*3/mm3      Immature Grans, Absolute 0.27 10*3/mm3      nRBC 0.0 /100 WBC     POC Glucose Once [[id number]]  (Normal) Collected: 11/03/22 0238    Specimen: Blood Updated: 11/03/22 0240     Glucose 117 mg/dL      Comment: Meter: [id number] : [id number] Papito Rees       POC Glucose Once [[id number]]  (Normal) Collected: 11/03/22 0132    Specimen: Blood Updated: 11/03/22 0133      Glucose 103 mg/dL      Comment: Meter: [id number] : [id number] Papito Moncadaher       POC Glucose Once [[id number]]  (Normal) Collected: 11/03/22 0018    Specimen: Blood Updated: 11/03/22 0020     Glucose 119 mg/dL      Comment: Meter: [id number] : [id number] Cobos Negrita       POC Glucose Once [[id number]]  (Abnormal) Collected: 11/02/22 2318    Specimen: Blood Updated: 11/02/22 2320     Glucose 151 mg/dL      Comment: Meter: [id number] : [id number] Cobos Negrita       POC Glucose Once [[id number]]  (Abnormal) Collected: 11/02/22 2212    Specimen: Blood Updated: 11/02/22 2220     Glucose 193 mg/dL      Comment: Meter: [id number] : [id number] Cobos Negrita       POC Glucose Once [[id number]]  (Abnormal) Collected: 11/02/22 2039    Specimen: Blood Updated: 11/02/22 2040     Glucose 176 mg/dL      Comment: Meter: [id number] : [id number] Cobos Negrita       POC Glucose Once [[id number]]  (Abnormal) Collected: 11/02/22 1928    Specimen: Blood Updated: 11/02/22 1931     Glucose 189 mg/dL      Comment: Meter: [id number] : [id number] Jack Singh       POC Glucose Once [[id number]]  (Abnormal) Collected: 11/02/22 1759    Specimen: Blood Updated: 11/02/22 1804     Glucose 166 mg/dL      Comment: Meter: [id number] : [id number] Jose Stacie       POC Glucose Once [[id number]]  (Abnormal) Collected: 11/02/22 1539    Specimen: Blood Updated: 11/02/22 1541     Glucose 143 mg/dL      Comment: Meter: [id number] : [id number] Jose Stacie       POC Glucose Once [[id number]]  (Abnormal) Collected: 11/02/22 1433    Specimen: Blood Updated: 11/02/22 1435     Glucose 153 mg/dL      Comment: Meter: [id number] : [id number] Jose Stacie       POC Glucose Once [[id number]]  (Abnormal) Collected: 11/02/22 1344    Specimen: Blood Updated: 11/02/22 1345     Glucose 174 mg/dL      Comment: Meter: [id number] : [id number] Rajat West       POC Glucose Once [[id number]]  (Abnormal) Collected: 11/02/22 1221     Specimen: Blood Updated: 11/02/22 1232     Glucose 156 mg/dL      Comment: Meter: [id number] : [id number] Jose Stacie       POC Glucose Once [[id number]]  (Normal) Collected: 11/02/22 1121    Specimen: Blood Updated: 11/02/22 1221     Glucose 128 mg/dL      Comment: Meter: [id number] : [id number] Jose Stacie       POC Glucose Once [[id number]]  (Normal) Collected: 11/02/22 1017    Specimen: Blood Updated: 11/02/22 1018     Glucose 119 mg/dL      Comment: Meter: [id number] : [id number] Rajat West       POC Glucose Once [[id number]]  (Abnormal) Collected: 11/02/22 0931    Specimen: Blood Updated: 11/02/22 0942     Glucose 135 mg/dL      Comment: Meter: [id number] : [id number] Jose Stacie             Imaging Results (Last 48 Hours)     Procedure Component Value Units Date/Time    XR Chest 1 View [[id number]] Collected: 11/03/22 0748     Updated: 11/03/22 0752    Narrative:      XR CHEST 1 VW-     Date of Exam: 11/3/2022 4:25 AM     Indication: Post-Op Heart Surgery; I25.110-Atherosclerotic heart disease  of native coronary artery with unstable angina pectoris; Z00.6-Encounter  for examination for normal comparison and control in clinical research  program.     Comparison:?11/2/2022     Technique:?A single view of the chest was obtained.     FINDINGS:     ?Patient is again noted be status post median sternotomy.  Left  subclavian transvenous pacemaker is unchanged.  In the interval the  McFall-Moraima catheters been removed.  Right internal jugular introducer  sheath remains in place.  Heart size is within normal limits.  Pulmonary  vessels appear normal.  Lung volumes are low.  There is persistent left  basilar and bilateral perihilar atelectasis.  No definite pleural  effusion.  No evidence pneumothorax.             Impression:            1.  Interval removal of McFall-Moraima catheter.  2.  Low volume inspiration with persistent bilateral atelectasis.        This report was finalized on 11/3/2022 7:49 AM by Jovany  MD Festus.       XR Chest 1 View [[id number]] Collected: 11/02/22 0933     Updated: 11/02/22 0938    Narrative:      DATE OF EXAM: 11/2/2022 4:46 AM     PROCEDURE: XR CHEST 1 VW-     INDICATIONS: Post-Op Heart Surgery; I25.110-Atherosclerotic heart  disease of native coronary artery with unstable angina pectoris;  Z00.6-Encounter for examination for normal comparison and control in  clinical research program.      COMPARISON: Chest x-ray 11/1/2022     TECHNIQUE: Single frontal view of the chest.     FINDINGS:  Redemonstration of surgical changes of CABG. Interval extubation and  removal of NG/OG tube. Otherwise stable medical support devices. Stable  cardiomediastinal silhouette within normal limits. Mild worsening of  persistent low lung volumes. There are streaky parenchymal opacities in  the right mid and left lung base, likely atelectasis or bronchovascular  crowding in the setting of low lung volumes. No significant pleural  effusion. No definite pneumothorax.       Impression:      Interval extubation and removal of NG/OG tube with otherwise stable  medical support devices. Mild worsening of persistent low lung volumes  with bibasilar atelectasis.     This report was finalized on 11/2/2022 9:35 AM by Nitin Salgado MD.              Physician Progress Notes (last 48 hours)      Victorina Ya PA-C at 11/04/22 0700          Cardiothoracic Surgery Progress Note      POD # 3 s/p CABG x 1, LAAL     LOS: 8 days      Subjective:  Up in chair, no complaints  On 4L NC overnight, not requiring supplemental O2 during the day    Objective:  Vital Signs  Temp:  [96.7 °F (35.9 °C)-98.4 °F (36.9 °C)] 98.2 °F (36.8 °C)  Heart Rate:  [75-86] 79  Resp:  [16-28] 18  BP: ()/(49-77) 98/53    Physical Exam:   General Appearance: alert, appears stated age and cooperative   Lungs: clear to auscultation, respirations regular, respirations even and respirations unlabored   Heart: regular rhythm & normal rate, normal S1, S2  and no murmur, no gallop, no rub   Skin: Incision c/d/i     Results:    Results from last 7 days   Lab Units 11/04/22  0303   WBC 10*3/mm3 16.23*   HEMOGLOBIN g/dL 10.7*   HEMATOCRIT % 33.2*   PLATELETS 10*3/mm3 95*     Results from last 7 days   Lab Units 11/04/22  0303   SODIUM mmol/L 129*   POTASSIUM mmol/L 4.2   CHLORIDE mmol/L 95*   CO2 mmol/L 24.0   BUN mg/dL 21*   CREATININE mg/dL 0.83   GLUCOSE mg/dL 212*   CALCIUM mg/dL 8.6       Assessment:  POD # 3 s/p CABG x 1, LAAL  Expected postoperative course    Plan:  Await Transfer to telemetry  Ambulate  Pulmonary toilet  ASA, statin, BB    Victorina Ya PA-C  11/04/22  07:00 EDT          Electronically signed by Victorina Ya PA-C at 11/04/22 0700     Ankit Pretty MD at 11/03/22 2025          [facility]  IP Progress Note    PROBLEM LIST:  CARDIAC:   a.  Coronary artery disease:   1.  Unstable angina, [facility], October 2022: Left main normal, ostial LAD 99% stenosis, rest of the arteries are normal   2. CABG x 1, 11/1/22: LIMA-LAD     b.  Myocardium:   1.  Echo, October 2022: LVEF normal   2. Echo,LVEF 60% with apical akinesia     c.  Valvular:   1.  No significant valvular disease     d.  Electrical:   1.  History of sick sinus syndrome s/p pacemaker placement at age 28   2. TIMA closure during CABG     e.  Percardium:   1.  Normal pericardium     CARDIAC RISK FACTORS:   1.  Type 2 diabetes   2.  Hyperlipidemia   3.  Family history of coronary artery disease   4.  Morbid obesity       NON CARDIAC:   1.  Thyroid cancer s/p thyroidectomy   2.  Fatty liver   3.  Vitamin D deficiency   4.  Headaches     SURGERIES:   1.  Thyroidectomy         HOSPITAL COURSE:    Patient admitted with non-ST segment elevation and underwent successful revascularization.      Subjective   She is doing much better and she will be moving out today.          Objective     Blood pressure 125/77, pulse 81, temperature 97.1 °F (36.2 °C),  "temperature source Temporal, resp. rate 22, height 162.6 cm (64.02\"), weight 103 kg (227 lb 12.8 oz), SpO2 93 %, not currently breastfeeding.     Intake/Output Summary (Last 24 hours) at 11/3/2022 1715  Last data filed at 11/3/2022 1400  Gross per 24 hour   Intake 3287 ml   Output 610 ml   Net 2677 ml       PHYSICAL EXAM:  General: No acute distress.   Neck: no JVD.  Chest:No respiratory distress, breath sounds are normal. No wheezes,  rhonchi or rales.  Scar well-healed  Cardiovascular: Normal S1 and S2, no murmer, gallop or rub.    Extremities: No edema.    RESULR REVIEW:    I reviewed the patient's new clinical results.      MEDICATIONS:    acetaminophen, 650 mg, Oral, Q8H  aspirin, 325 mg, Oral, Daily  atorvastatin, 40 mg, Oral, Nightly  famotidine, 20 mg, Oral, BID AC  gabapentin, 100 mg, Oral, Q8H  heparin (porcine), 5,000 Units, Subcutaneous, Q8H  insulin detemir, 30 Units, Subcutaneous, Daily  insulin lispro, 0-14 Units, Subcutaneous, 4x Daily With Meals & Nightly  Insulin Lispro, 8 Units, Subcutaneous, TID With Meals  levothyroxine, 175 mcg, Oral, Q AM  metoprolol tartrate, 12.5 mg, Oral, Q12H  pharmacy consult - [place], , Does not apply, Daily  senna-docusate sodium, 2 tablet, Oral, BID  sodium chloride, 10 mL, Intravenous, Q12H          Results from last 7 days   Lab Units 11/03/22  0243   WBC 10*3/mm3 24.79*   HEMOGLOBIN g/dL 11.6*   HEMATOCRIT % 35.2   PLATELETS 10*3/mm3 115*     Results from last 7 days   Lab Units 11/03/22  0243   SODIUM mmol/L 132*   POTASSIUM mmol/L 4.7   CHLORIDE mmol/L 97*   CO2 mmol/L 22.0   BUN mg/dL 16   CREATININE mg/dL 0.80   CALCIUM mg/dL 9.0   BILIRUBIN mg/dL 0.3   ALK PHOS U/L 65   ALT (SGPT) U/L 13   AST (SGOT) U/L 19   GLUCOSE mg/dL 119*     Results from last 7 days   Lab Units 11/02/22  0235 11/01/22  1753 10/28/22  0139   INR  1.10 1.32* 1.00     Lab Results   Component Value Date    TROPONINT 0.015 10/27/2022    TROPONINT 0.013 10/27/2022     Results from last 7 days "   Lab Units 10/29/22  1410 10/29/22  0739   TSH uIU/mL  --  4.980*   FREE T4 ng/dL 1.82*  --      Results from last 7 days   Lab Units 10/28/22  0139   CHOLESTEROL mg/dL 133   TRIGLYCERIDES mg/dL 184*   HDL CHOL mg/dL 46   LDL CHOL mg/dL 57         No results found for: IRON, FERRITIN, LABIRON, TIBC   Hemoglobin A1C   Date Value Ref Range Status   10/27/2022 11.80 (H) 4.80 - 5.60 % Final     Magnesium   Date Value Ref Range Status   11/03/2022 1.9 1.6 - 2.6 mg/dL Final        Tele: Sinus Rythym      Assessment:     1. Coronary artery disease status postcoronary artery bypass graft  2.  Hyperlipidemia  3.  Diabetes        Plan:     1. Because of her non-ST segment elevation MI we will switch her to aspirin 81 mg with Plavix 75 mg daily.    2. We will continue her rest of the medication.  I will add Farxiga for her vascular protection.                                    Electronically signed by Ankit Pretty MD at 11/03/22 3174     Hiren Hays MD at 11/03/22 1150          Pulmonary/Critical Care Follow-up     LOS: 7 days   Patient Care Team:  Bobby Kiser FNP as PCP - General (Nurse Practitioner)  Micky Irby MD as Consulting Physician (Endocrinology)    Chief Complaint: Chest pain/medical management of issues postoperatively after CABG including but not limited to: Respiratory, electrolyte, and glycemic management.      Subjective     Interval History:   Tawanna Huang is a 42 y.o. female with PMH of T2DM, thyroid CA s/p thyroidectomy, tachy-gilma syndrome s/p PPM admitted to [facility] on 10/27 from [facility] for NSTEMI and CABG evaluation.     The patient presented to the OSH on 10/21 with complaints of intermittent chest pain with acute worsening prompting ED evaluation and EKG consistent with NSTEMI. LHC displayed >90% LAD stenosis with an EF of 50%. She was transferred to [facility] and evaluated by CTS who recommended surgical revascularization and underwent pre-operative work-up. She has been  maintained on a Heparin gtt and ASA. TTE results are pending.     UDS + THC.     CXR shows reduced lung volumes. Pre-op PFTs as follows: FVC 2.36, 64%; FEV1 2.05, 69%; and ratio 0.87.     She underwent CABG x1 (LIMA to LAD) with left atrial appendage ligation, by Dr. Lane on 11/1/2022.     Interval history:  Patient doing well.  Remains on insulin drip at lower levels.  Afebrile.  Pain adequately controlled.  Denies dyspnea.  No new complaints.  Ate breakfast.  Ambulated with physical therapy.  Chest tubes out this morning.    History taken from: chart     PMH/FH/Social History were reviewed and updated appropriately in the electronic medical record.     Review of Systems:    Review of 14 systems was completed with positives and pertinent negatives noted in the subjective section.  All other systems reviewed and are negative.         Objective     Vital Signs  Temp:  [96.7 °F (35.9 °C)-98.4 °F (36.9 °C)] 96.7 °F (35.9 °C)  Heart Rate:  [75-88] 75  Resp:  [20-35] 22  BP: ()/(53-88) 88/53  11/02 0701 - 11/03 0700  In: 2787 [P.O.:1925; I.V.:650]  Out: 1045 [Urine:770]  Body mass index is 39.08 kg/m².     Physical Exam:     Constitutional:   Awake and alert, in no acute distress   Head:    Normocephalic, without obvious abnormality, atraumatic   Eyes:            Lids and lashes normal   ENMT:   Ears appear intact with no abnormalities noted       Neck:   No adenopathy, trachea midline, right internal jugular catheter in place       Lungs/Resp:    Normal effort, symmetric chest rise, no crepitus, diminished breath sounds diffusely and bilaterally, left upper chest pacemaker.                Heart/CV:    Regular rhythm and normal rate, normal S1 and S2, no            murmur   Abdomen/GI:     Normal bowel sounds, no masses, no organomegaly, soft,        non-tender, non-distended   :     Deferred   Extremities/MSK:   No clubbing or cyanosis.  No edema.  Normal tone.  No deformities.    Pulses:   Pulses palpable and  equal bilaterally   Skin:   No bleeding, bruising or rash   Heme/Lymph:   No cervical or supraclavicular adenopathy.    Neurologic:    Psychiatric:     Moves all extremities without obvious focal motor deficit.  Awake and follows commands.  Nonagitated.         Above findings represent my personal examination findings from today.  Electronically signed by:Hiren Hays MD 11/03/22 11:50 EDT     Results Review:     I reviewed the patient's new clinical results.   Results from last 7 days   Lab Units 11/03/22 0243 11/02/22 0235 11/01/22 2133 11/01/22  1034 11/01/22  0657   SODIUM mmol/L 132* 137 139   < > 137   POTASSIUM mmol/L 4.7 4.8 4.9   < > 4.7   CHLORIDE mmol/L 97* 103 105   < > 99   CO2 mmol/L 22.0 20.0* 22.0   < > 26.0   BUN mg/dL 16 13 14   < > 15   CREATININE mg/dL 0.80 0.72 0.74   < > 0.72   CALCIUM mg/dL 9.0 9.3 9.9   < > 8.8   BILIRUBIN mg/dL 0.3 0.4  --   --  0.4   ALK PHOS U/L 65 64  --   --  87   ALT (SGPT) U/L 13 19  --   --  22   AST (SGOT) U/L 19 31  --   --  14   GLUCOSE mg/dL 119* 191* 174*   < > 273*    < > = values in this interval not displayed.     Results from last 7 days   Lab Units 11/03/22 0243 11/02/22 0235 11/01/22 2133   WBC 10*3/mm3 24.79* 26.41* 20.71*   HEMOGLOBIN g/dL 11.6* 12.2 12.0   HEMATOCRIT % 35.2 37.2 36.7   PLATELETS 10*3/mm3 115* 137* 81*     Results from last 7 days   Lab Units 11/01/22  2310   PH, ARTERIAL pH units 7.310*   PO2 ART mm Hg 118.0*   PCO2, ARTERIAL mm Hg 46.4*   HCO3 ART mmol/L 23.3     Results from last 7 days   Lab Units 11/03/22 0243 11/02/22 0235 11/01/22 2133 11/01/22  1753   MAGNESIUM mg/dL 1.9 2.4  --  1.8   PHOSPHORUS mg/dL  --  6.1* 6.0* 4.4     Results from last 7 days   Lab Units 10/27/22  2012   HEMOGLOBIN A1C % 11.80*         I reviewed the patient's new imaging including images and reports.  Chest x-ray 11/2/2022: Interval extubation.  Persistent low lung volumes and basilar atelectasis.  Postoperative changes without obvious  pneumothorax.     Medication Review:   acetaminophen, 650 mg, Oral, Q8H  aspirin, 325 mg, Oral, Daily  atorvastatin, 40 mg, Oral, Nightly  famotidine, 20 mg, Oral, BID AC  gabapentin, 100 mg, Oral, Q8H  heparin (porcine), 5,000 Units, Subcutaneous, Q8H  insulin detemir, 30 Units, Subcutaneous, Daily  insulin lispro, 0-14 Units, Subcutaneous, 4x Daily With Meals & Nightly  Insulin Lispro, 8 Units, Subcutaneous, TID With Meals  levothyroxine, 175 mcg, Oral, Q AM  metoprolol tartrate, 12.5 mg, Oral, Q12H  pharmacy consult - [facility], , Does not apply, Daily  senna-docusate sodium, 2 tablet, Oral, BID  sodium chloride, 10 mL, Intravenous, Q12H      Scopolamine, 1 patch        Assessment & Plan       MV CAD     Tachy-gilma syndrome s/p PPM     Acquired hypothyroidism- H/O thyroid cancer s/p thryoidectomy 2012    T2DM on insulin and metformin     Class 2 obesity in adult    Constipation    Marijuana use (UDS + THC)     42 y.o. female with history of poorly controlled diabetes (hemoglobin A1c 11.8% 10/27/2022), thyroid cancer status post thyroidectomy, tachybradycardia syndrome status post permanent pacemaker, admitted to [facility] on 10/27/2022 from [facility] after NSTEMI for CABG evaluation.  She was found to have significant LAD disease with proximal disease not amenable to stent.  Urine drug screen was positive for THC.    Patient underwent CABG x1 with left atrial appendage clip by Dr. Lane on 11/1/2022.  Doing well from a respiratory standpoint.  Chest tubes out.  Remains on insulin drip.  Tolerating diet.  We will transition insulin drip today.  Okay to telemetry from pulmonary standpoint.    Plan:  1.  For coronary artery disease: Status post CABG x1 on 11/1/2022: Aspirin, statin, beta-blocker as tolerated.  Postoperative surgical management per CT surgery.  Cardiology following.  2.  For uncontrolled diabetes: Insulin drip protocol for now.  Transition to subcutaneous protocol with basal plus prandial  and correction insulin.   3.  For acquired hypothyroidism: Continue levothyroxine.  Hospitalist had reduced dose from 200 mcg 175 mcg and we will continue this with plan to follow-up with PCP in 6 weeks with repeat labs.  4.  DVT prophylaxis: Limit to mechanical only while mediastinal tubes are in place.  5.  Respiratory: Supplemental oxygen as needed.  Encourage incentive spirometer use.  Mobilize.    6.  We will follow while in the intensive care unit.  Okay to telemetry when okay with CT surgery.      Electronically signed by:  Hiren Hays MD  11/03/22  11:50 EDT      Electronically signed by Hiren Hays MD at 11/03/22 1152     Bahman Lane MD at 11/03/22 0703          Cardiothoracic Surgery Progress Note      POD # 2 s/p CABG x 1, LAAL     LOS: 7 days      Subjective:  Up in chair, without complaint this am    Objective:  Vital Signs  Temp:  [97.8 °F (36.6 °C)-99 °F (37.2 °C)] 98.2 °F (36.8 °C)  Heart Rate:  [75-88] 80  Resp:  [20-35] 24  BP: ()/(53-88) 101/66  Arterial Line BP: (109-120)/(66-68) 109/68    Physical Exam:   General Appearance: alert, appears stated age and cooperative   Lungs: clear to auscultation, respirations regular, respirations even and respirations unlabored   Heart: regular rhythm & normal rate, normal S1, S2 and no murmur, no gallop, no rub   Skin: Incision c/d/i     Results:    Results from last 7 days   Lab Units 11/03/22  0243   WBC 10*3/mm3 24.79*   HEMOGLOBIN g/dL 11.6*   HEMATOCRIT % 35.2   PLATELETS 10*3/mm3 115*     Results from last 7 days   Lab Units 11/03/22  0243   SODIUM mmol/L 132*   POTASSIUM mmol/L 4.7   CHLORIDE mmol/L 97*   CO2 mmol/L 22.0   BUN mg/dL 16   CREATININE mg/dL 0.80   GLUCOSE mg/dL 119*   CALCIUM mg/dL 9.0       Assessment:  POD # 2 s/p CABG x 1, LAAL  Expected postoperative course    Plan:  D/C chest tubes and wires  D/C central line  Transfer to telemetry  Ambulate  Pulmonary toilet  ASA, statin, BB    Bahman Lane,  "MD  11/03/22  07:03 EDT          Electronically signed by Bahman Lane MD at 11/04/22 0554     Ankit Pretty MD at 11/02/22 1551          [facility] at   IP Progress Note    PROBLEM LIST:    CARDIAC:  a.  Coronary artery disease:  1.  Unstable angina, [facility], October 2022: Left main normal, ostial LAD 99% stenosis, rest of the arteries are normal  2. CABG x 1, 11/1/22: LIMA-LAD     b.  Myocardium:  1.  Echo, October 2022: LVEF normal  2. Echo,LVEF 60% with apical akinesia     c.  Valvular:  1.  No significant valvular disease    d.  Electrical:  1.  History of sick sinus syndrome s/p pacemaker placement at age 28  2.  TIAM closure during CABG    e.  Percardium:  1.  Normal pericardium    CARDIAC RISK FACTORS:  1.  Type 2 diabetes  2.  Hyperlipidemia  3.  Family history of coronary artery disease  4.  Morbid obesity       NON CARDIAC:  1.  Thyroid cancer s/p thyroidectomy  2.  Fatty liver  3.  Vitamin D deficiency  4.  Headaches    SURGERIES:  1.  Thyroidectomy        HOSPITAL COURSE:    Patient was transferred for non-ST segment elevation MI without ostial disease to the LAD.  Patient has undergone successful revascularization.      Subjective   Patient sitting in the chair at present time with no significant complaints          Objective     Blood pressure 96/62, pulse 77, temperature 97.9 °F (36.6 °C), temperature source Bladder, resp. rate (!) 29, height 162.6 cm (64.02\"), weight 103 kg (227 lb 12.8 oz), SpO2 94 %, not currently breastfeeding.     Intake/Output Summary (Last 24 hours) at 11/2/2022 1555  Last data filed at 11/2/2022 1400  Gross per 24 hour   Intake 3082.5 ml   Output 2810 ml   Net 272.5 ml       PHYSICAL EXAM:  General: No acute distress.   Neck: no JVD.  Chest:No respiratory distress, breath sounds are normal. No wheezes,  rhonchi or rales.  Cardiovascular: Normal S1 and S2, no murmer, gallop or rub.    Extremities: No edema.    RESULR REVIEW:    I reviewed the " patient's new clinical results.      MEDICATIONS:    acetaminophen, 650 mg, Oral, Q8H  aspirin, 325 mg, Oral, Daily  atorvastatin, 40 mg, Oral, Nightly  ceFAZolin, 2 g, Intravenous, Q8H  famotidine, 20 mg, Oral, BID AC  gabapentin, 100 mg, Oral, Q8H  levothyroxine, 175 mcg, Oral, Q AM  metoprolol tartrate, 12.5 mg, Oral, Q12H  metoprolol tartrate, 2.5 mg, Intravenous, Q6H  pharmacy consult - [facility], , Does not apply, Daily  senna-docusate sodium, 2 tablet, Oral, BID  sodium chloride, 10 mL, Intravenous, Q12H          Results from last 7 days   Lab Units 11/02/22  0235   WBC 10*3/mm3 26.41*   HEMOGLOBIN g/dL 12.2   HEMATOCRIT % 37.2   PLATELETS 10*3/mm3 137*     Results from last 7 days   Lab Units 11/02/22  0235   SODIUM mmol/L 137   POTASSIUM mmol/L 4.8   CHLORIDE mmol/L 103   CO2 mmol/L 20.0*   BUN mg/dL 13   CREATININE mg/dL 0.72   CALCIUM mg/dL 9.3   BILIRUBIN mg/dL 0.4   ALK PHOS U/L 64   ALT (SGPT) U/L 19   AST (SGOT) U/L 31   GLUCOSE mg/dL 191*     Results from last 7 days   Lab Units 11/02/22  0235 11/01/22  1753 10/28/22  0139   INR  1.10 1.32* 1.00     Lab Results   Component Value Date    TROPONINT 0.015 10/27/2022    TROPONINT 0.013 10/27/2022     Results from last 7 days   Lab Units 10/29/22  1410 10/29/22  0739   TSH uIU/mL  --  4.980*   FREE T4 ng/dL 1.82*  --      Results from last 7 days   Lab Units 10/28/22  0139   CHOLESTEROL mg/dL 133   TRIGLYCERIDES mg/dL 184*   HDL CHOL mg/dL 46   LDL CHOL mg/dL 57         No results found for: IRON, FERRITIN, LABIRON, TIBC   Hemoglobin A1C   Date Value Ref Range Status   10/27/2022 11.80 (H) 4.80 - 5.60 % Final     Magnesium   Date Value Ref Range Status   11/02/2022 2.4 1.6 - 2.6 mg/dL Final        Tele: Sinus Rythym      Assessment:     2. Coronary artery disease s/p coronary artery bypass graft    2.  Sick sinus syndrome s/p pacemaker at age 28 with left atrial appendage closure now.    3.Uncontrolled diabetes    4.Uncontrolled hypertension        Plan:      3. Patient is recovering good from her coronary artery bypass graft.  We will switch to aspirin and Plavix in the morning once chest tubes are out.    4. We will start Farxiga 10 mg daily    5. She is already on high-dose statin.    4.   We will keep on adding the medication once she recovers.                                    Electronically signed by Ankit Pretty MD at 11/02/22 1612     Hiren Hays MD at 11/02/22 1212          Pulmonary/Critical Care Follow-up     LOS: 6 days   Patient Care Team:  Bobby Kiser FNP as PCP - General (Nurse Practitioner)  Micky Irby MD as Consulting Physician (Endocrinology)    Chief Complaint: Chest pain/medical management of issues postoperatively after CABG including but not limited to: Respiratory, electrolyte, and glycemic management.      Subjective     Interval History:   Tawanna Huang is a 42 y.o. female with PMH of T2DM, thyroid CA s/p thyroidectomy, tachy-gilma syndrome s/p PPM admitted to [facility] on 10/27 from [facility] for NSTEMI and CABG evaluation.     The patient presented to the OSH on 10/21 with complaints of intermittent chest pain with acute worsening prompting ED evaluation and EKG consistent with NSTEMI. LHC displayed >90% LAD stenosis with an EF of 50%. She was transferred to [facility] and evaluated by CTS who recommended surgical revascularization and underwent pre-operative work-up. She has been maintained on a Heparin gtt and ASA. TTE results are pending.     UDS + THC.     CXR shows reduced lung volumes. Pre-op PFTs as follows: FVC 2.36, 64%; FEV1 2.05, 69%; and ratio 0.87.     She underwent CABG x1 (LIMA to LAD) with left atrial appendage ligation, by Dr. Lane on 11/1/2022.     Interval history:  Patient extubated overnight.  She was a little drowsy this morning but seems perfectly awake for me.  Pain is adequately controlled.  She is on low-dose norepinephrine.  T-max 100.2 Fahrenheit.  She is on high levels of insulin on  drip.    History taken from: chart     PMH/FH/Social History were reviewed and updated appropriately in the electronic medical record.     Review of Systems:    Review of 14 systems was completed with positives and pertinent negatives noted in the subjective section.  All other systems reviewed and are negative.         Objective     Vital Signs  Temp:  [98 °F (36.7 °C)-100.2 °F (37.9 °C)] 98.8 °F (37.1 °C)  Heart Rate:  [] 78  Resp:  [16-36] 30  BP: ()/(48-83) 90/58  Arterial Line BP: ()/(52-87) 109/68  FiO2 (%):  [40 %-100 %] 40 %  11/01 0701 - 11/02 0700  In: 2722.5 [I.V.:1507.5]  Out: 2435 [Urine:1745]  Body mass index is 39.08 kg/m².  FiO2 (%):  [40 %-100 %] 40 %  S RR:  [12-16] 16  PEEP/CPAP (cm H2O):  [5 cm H20] 5 cm H20  VA SUP:  [10 cm H20] 10 cm H20  MAP (cm H2O):  [8.1-10] 8.1  Physical Exam:     Constitutional:   Awake and alert, in no acute distress   Head:    Normocephalic, without obvious abnormality, atraumatic   Eyes:            Lids and lashes normal   ENMT:   Ears appear intact with no abnormalities noted       Neck:   No adenopathy, trachea midline, right internal jugular catheter in place       Lungs/Resp:    Normal effort, symmetric chest rise, no crepitus, diminished breath sounds diffusely and bilaterally, left upper chest pacemaker.                Heart/CV:    Regular rhythm and normal rate, normal S1 and S2, no            murmur   Abdomen/GI:     Normal bowel sounds, no masses, no organomegaly, soft,        non-tender, non-distended   :     Deferred   Extremities/MSK:   No clubbing or cyanosis.  No edema.  Normal tone.  No deformities.    Pulses:   Pulses palpable and equal bilaterally   Skin:   No bleeding, bruising or rash   Heme/Lymph:   No cervical or supraclavicular adenopathy.    Neurologic:    Psychiatric:     Moves all extremities without obvious focal motor deficit.  Awake and follows commands.  Nonagitated.         Above findings represent my personal  examination findings from today.  Electronically signed by:Hiren Hays MD 11/02/22 12:12 EDT     Results Review:     I reviewed the patient's new clinical results.   Results from last 7 days   Lab Units 11/02/22 0235 11/01/22 2133 11/01/22 1753 11/01/22  1034 11/01/22  0657 10/31/22  0613   SODIUM mmol/L 137 139 136   < > 137 134*   POTASSIUM mmol/L 4.8 4.9 3.6   < > 4.7 4.5   CHLORIDE mmol/L 103 105 102   < > 99 97*   CO2 mmol/L 20.0* 22.0 23.0   < > 26.0 26.0   BUN mg/dL 13 14 14   < > 15 11   CREATININE mg/dL 0.72 0.74 0.74   < > 0.72 0.71   CALCIUM mg/dL 9.3 9.9 10.2   < > 8.8 8.7   BILIRUBIN mg/dL 0.4  --   --   --  0.4 0.4   ALK PHOS U/L 64  --   --   --  87 92   ALT (SGPT) U/L 19  --   --   --  22 23   AST (SGOT) U/L 31  --   --   --  14 17   GLUCOSE mg/dL 191* 174* 179*   < > 273* 241*    < > = values in this interval not displayed.     Results from last 7 days   Lab Units 11/02/22 0235 11/01/22 2133 11/01/22 1753   WBC 10*3/mm3 26.41* 20.71* 18.25*   HEMOGLOBIN g/dL 12.2 12.0 12.0   HEMATOCRIT % 37.2 36.7 35.7   PLATELETS 10*3/mm3 137* 81* 78*     Results from last 7 days   Lab Units 11/01/22  2310   PH, ARTERIAL pH units 7.310*   PO2 ART mm Hg 118.0*   PCO2, ARTERIAL mm Hg 46.4*   HCO3 ART mmol/L 23.3     Results from last 7 days   Lab Units 11/02/22 0235 11/01/22 2133 11/01/22 1753 11/01/22  0657   MAGNESIUM mg/dL 2.4  --  1.8 2.0   PHOSPHORUS mg/dL 6.1* 6.0* 4.4  --      Results from last 7 days   Lab Units 10/27/22  2012   HEMOGLOBIN A1C % 11.80*         I reviewed the patient's new imaging including images and reports.  Chest x-ray 11/2/2022: Interval extubation.  Persistent low lung volumes and basilar atelectasis.  Postoperative changes without obvious pneumothorax.     Medication Review:   acetaminophen, 650 mg, Oral, Q8H  aspirin, 325 mg, Oral, Daily  atorvastatin, 40 mg, Oral, Nightly  ceFAZolin, 2 g, Intravenous, Q8H  famotidine, 20 mg, Oral, BID AC  gabapentin, 100 mg, Oral,  Q8H  levothyroxine, 175 mcg, Oral, Q AM  metoprolol tartrate, 12.5 mg, Oral, Q12H  metoprolol tartrate, 2.5 mg, Intravenous, Q6H  pharmacy consult - [place], , Does not apply, Daily  [MAR Hold] polyethylene glycol, 17 g, Oral, BID  senna-docusate sodium, 2 tablet, Oral, BID  sodium chloride, 10 mL, Intravenous, Q12H      dextrose 5 % with KCl 20 mEq, 30 mL/hr, Last Rate: 30 mL/hr (11/01/22 1739)  insulin, 0-100 Units/hr, Last Rate: 15.6 Units/hr (11/02/22 0755)  niCARdipine, 5-15 mg/hr  nitroglycerin, 5-200 mcg/min, Last Rate: Stopped (11/01/22 1935)  norepinephrine, 0.02-0.3 mcg/kg/min, Last Rate: 0.01 mcg/kg/min (11/02/22 0900)  phenylephrine, 0.5-3 mcg/kg/min  Scopolamine, 1 patch        Assessment & Plan       MV CAD     Tachy-gilma syndrome s/p PPM     Acquired hypothyroidism- H/O thyroid cancer s/p thryoidectomy 2012    T2DM on insulin and metformin     Class 2 obesity in adult    Constipation    Marijuana use (UDS + THC)     42 y.o. female with history of poorly controlled diabetes (hemoglobin A1c 11.8% 10/27/2022), thyroid cancer status post thyroidectomy, tachybradycardia syndrome status post permanent pacemaker, admitted to [facility] on 10/27/2022 from [facility] after NSTEMI for CABG evaluation.  She was found to have significant LAD disease with proximal disease not amenable to stent.  Urine drug screen was positive for THC.    Patient underwent CABG x1 with left atrial appendage clip by Dr. Lane on 11/1/2022.  Patient extubated overnight.  Doing fairly well.  Still requiring a lot of insulin on drip.  Low lung volumes and atelectasis on chest x-ray.    Plan:  1.  For coronary artery disease: Status post CABG x1 on 11/1/2022: Aspirin, statin, beta-blocker as tolerated.  Postoperative surgical management per CT surgery.  Cardiology following.  2.  For uncontrolled diabetes: Insulin drip protocol for now.  Continue drip as blood sugars have not stabilized.  Hopefully can transition tomorrow if  tolerating p.o. and blood sugar stable.  3.  For acquired hypothyroidism: Continue levothyroxine.  Hospitalist had reduced dose from 200 mcg 175 mcg and we will continue this with plan to follow-up with PCP in 6 weeks with repeat labs.  4.  DVT prophylaxis: Limit to mechanical only while mediastinal tubes are in place.  5.  Respiratory: Supplemental oxygen as needed.  Encourage incentive spirometer use.  Mobilize.    6.  We will follow while in the intensive care unit.    Level of Risk High due to: illness with threat to life or bodily function tenuous respiratory status in the setting of major chest surgery with underlying obesity and poorly controlled diabetes.    Electronically signed by:  Hiren Hays MD  11/02/22  12:12 EDT        Electronically signed by Hiren Hays MD at 11/02/22 1220       Consult Notes (last 48 hours)  Notes from 11/02/22 0927 through 11/04/22 0927   No notes of this type exist for this encounter.

## 2022-11-04 NOTE — PROGRESS NOTES
Cardiothoracic Surgery Progress Note      POD # 3 s/p CABG x 1, LAAL     LOS: 8 days      Subjective:  Up in chair, no complaints  On 4L NC overnight, not requiring supplemental O2 during the day    Objective:  Vital Signs  Temp:  [96.7 °F (35.9 °C)-98.4 °F (36.9 °C)] 98.2 °F (36.8 °C)  Heart Rate:  [75-86] 79  Resp:  [16-28] 18  BP: ()/(49-77) 98/53    Physical Exam:   General Appearance: alert, appears stated age and cooperative   Lungs: clear to auscultation, respirations regular, respirations even and respirations unlabored   Heart: regular rhythm & normal rate, normal S1, S2 and no murmur, no gallop, no rub   Skin: Incision c/d/i     Results:    Results from last 7 days   Lab Units 11/04/22  0303   WBC 10*3/mm3 16.23*   HEMOGLOBIN g/dL 10.7*   HEMATOCRIT % 33.2*   PLATELETS 10*3/mm3 95*     Results from last 7 days   Lab Units 11/04/22  0303   SODIUM mmol/L 129*   POTASSIUM mmol/L 4.2   CHLORIDE mmol/L 95*   CO2 mmol/L 24.0   BUN mg/dL 21*   CREATININE mg/dL 0.83   GLUCOSE mg/dL 212*   CALCIUM mg/dL 8.6       Assessment:  POD # 3 s/p CABG x 1, LAAL  Expected postoperative course    Plan:  Await Transfer to telemetry  Ambulate  Pulmonary toilet - poor expansion on CXR  ASA, statin, BB  Likely home in 24-48 hours    Bahman Lane MD  11/04/22  07:00 EDT

## 2022-11-05 ENCOUNTER — APPOINTMENT (OUTPATIENT)
Dept: GENERAL RADIOLOGY | Facility: HOSPITAL | Age: 42
End: 2022-11-05

## 2022-11-05 LAB
ALBUMIN SERPL-MCNC: 3.8 G/DL (ref 3.5–5.2)
ALBUMIN/GLOB SERPL: 1.1 G/DL
ALP SERPL-CCNC: 86 U/L (ref 39–117)
ALT SERPL W P-5'-P-CCNC: 11 U/L (ref 1–33)
ANION GAP SERPL CALCULATED.3IONS-SCNC: 13 MMOL/L (ref 5–15)
AST SERPL-CCNC: 15 U/L (ref 1–32)
BASOPHILS # BLD AUTO: 0.09 10*3/MM3 (ref 0–0.2)
BASOPHILS NFR BLD AUTO: 0.6 % (ref 0–1.5)
BILIRUB SERPL-MCNC: 0.3 MG/DL (ref 0–1.2)
BUN SERPL-MCNC: 26 MG/DL (ref 6–20)
BUN/CREAT SERPL: 29.2 (ref 7–25)
CALCIUM SPEC-SCNC: 8.9 MG/DL (ref 8.6–10.5)
CHLORIDE SERPL-SCNC: 96 MMOL/L (ref 98–107)
CO2 SERPL-SCNC: 24 MMOL/L (ref 22–29)
CREAT SERPL-MCNC: 0.89 MG/DL (ref 0.57–1)
DEPRECATED RDW RBC AUTO: 42.2 FL (ref 37–54)
EGFRCR SERPLBLD CKD-EPI 2021: 83.1 ML/MIN/1.73
EOSINOPHIL # BLD AUTO: 0.1 10*3/MM3 (ref 0–0.4)
EOSINOPHIL NFR BLD AUTO: 0.7 % (ref 0.3–6.2)
ERYTHROCYTE [DISTWIDTH] IN BLOOD BY AUTOMATED COUNT: 13.5 % (ref 12.3–15.4)
GLOBULIN UR ELPH-MCNC: 3.6 GM/DL
GLUCOSE BLDC GLUCOMTR-MCNC: 156 MG/DL (ref 70–130)
GLUCOSE BLDC GLUCOMTR-MCNC: 166 MG/DL (ref 70–130)
GLUCOSE BLDC GLUCOMTR-MCNC: 211 MG/DL (ref 70–130)
GLUCOSE BLDC GLUCOMTR-MCNC: 243 MG/DL (ref 70–130)
GLUCOSE BLDC GLUCOMTR-MCNC: 265 MG/DL (ref 70–130)
GLUCOSE SERPL-MCNC: 171 MG/DL (ref 65–99)
HCT VFR BLD AUTO: 34.8 % (ref 34–46.6)
HGB BLD-MCNC: 11.5 G/DL (ref 12–15.9)
IMM GRANULOCYTES # BLD AUTO: 0.2 10*3/MM3 (ref 0–0.05)
IMM GRANULOCYTES NFR BLD AUTO: 1.4 % (ref 0–0.5)
LYMPHOCYTES # BLD AUTO: 1.66 10*3/MM3 (ref 0.7–3.1)
LYMPHOCYTES NFR BLD AUTO: 11.5 % (ref 19.6–45.3)
MAGNESIUM SERPL-MCNC: 1.9 MG/DL (ref 1.6–2.6)
MCH RBC QN AUTO: 28.4 PG (ref 26.6–33)
MCHC RBC AUTO-ENTMCNC: 33 G/DL (ref 31.5–35.7)
MCV RBC AUTO: 85.9 FL (ref 79–97)
MONOCYTES # BLD AUTO: 0.93 10*3/MM3 (ref 0.1–0.9)
MONOCYTES NFR BLD AUTO: 6.4 % (ref 5–12)
NEUTROPHILS NFR BLD AUTO: 11.5 10*3/MM3 (ref 1.7–7)
NEUTROPHILS NFR BLD AUTO: 79.4 % (ref 42.7–76)
NRBC BLD AUTO-RTO: 0 /100 WBC (ref 0–0.2)
PLATELET # BLD AUTO: 139 10*3/MM3 (ref 140–450)
PMV BLD AUTO: 13.8 FL (ref 6–12)
POTASSIUM SERPL-SCNC: 3.9 MMOL/L (ref 3.5–5.2)
PROT SERPL-MCNC: 7.4 G/DL (ref 6–8.5)
RBC # BLD AUTO: 4.05 10*6/MM3 (ref 3.77–5.28)
SODIUM SERPL-SCNC: 133 MMOL/L (ref 136–145)
WBC NRBC COR # BLD: 14.48 10*3/MM3 (ref 3.4–10.8)

## 2022-11-05 PROCEDURE — 71045 X-RAY EXAM CHEST 1 VIEW: CPT

## 2022-11-05 PROCEDURE — 63710000001 INSULIN LISPRO (HUMAN) PER 5 UNITS: Performed by: INTERNAL MEDICINE

## 2022-11-05 PROCEDURE — 25010000002 HEPARIN (PORCINE) PER 1000 UNITS: Performed by: INTERNAL MEDICINE

## 2022-11-05 PROCEDURE — 85025 COMPLETE CBC W/AUTO DIFF WBC: CPT | Performed by: INTERNAL MEDICINE

## 2022-11-05 PROCEDURE — 83735 ASSAY OF MAGNESIUM: CPT | Performed by: INTERNAL MEDICINE

## 2022-11-05 PROCEDURE — 80053 COMPREHEN METABOLIC PANEL: CPT | Performed by: INTERNAL MEDICINE

## 2022-11-05 PROCEDURE — 82962 GLUCOSE BLOOD TEST: CPT

## 2022-11-05 PROCEDURE — 63710000001 INSULIN DETEMIR PER 5 UNITS: Performed by: INTERNAL MEDICINE

## 2022-11-05 PROCEDURE — 0 MAGNESIUM SULFATE 4 GM/100ML SOLUTION: Performed by: THORACIC SURGERY (CARDIOTHORACIC VASCULAR SURGERY)

## 2022-11-05 PROCEDURE — 99232 SBSQ HOSP IP/OBS MODERATE 35: CPT | Performed by: INTERNAL MEDICINE

## 2022-11-05 RX ORDER — SODIUM CHLORIDE 0.9 % (FLUSH) 0.9 %
10 SYRINGE (ML) INJECTION EVERY 12 HOURS SCHEDULED
Status: DISCONTINUED | OUTPATIENT
Start: 2022-11-05 | End: 2022-11-06 | Stop reason: HOSPADM

## 2022-11-05 RX ORDER — SODIUM CHLORIDE 0.9 % (FLUSH) 0.9 %
10 SYRINGE (ML) INJECTION EVERY 8 HOURS SCHEDULED
Status: DISCONTINUED | OUTPATIENT
Start: 2022-11-05 | End: 2022-11-06 | Stop reason: HOSPADM

## 2022-11-05 RX ORDER — AMOXICILLIN 250 MG
2 CAPSULE ORAL 2 TIMES DAILY PRN
Status: DISCONTINUED | OUTPATIENT
Start: 2022-11-05 | End: 2022-11-06 | Stop reason: HOSPADM

## 2022-11-05 RX ORDER — BISACODYL 5 MG/1
10 TABLET, DELAYED RELEASE ORAL DAILY PRN
Status: DISCONTINUED | OUTPATIENT
Start: 2022-11-05 | End: 2022-11-06 | Stop reason: HOSPADM

## 2022-11-05 RX ORDER — BISACODYL 10 MG
10 SUPPOSITORY, RECTAL RECTAL DAILY PRN
Status: DISCONTINUED | OUTPATIENT
Start: 2022-11-05 | End: 2022-11-06 | Stop reason: HOSPADM

## 2022-11-05 RX ORDER — DOCUSATE SODIUM 100 MG/1
100 CAPSULE, LIQUID FILLED ORAL 2 TIMES DAILY PRN
Status: DISCONTINUED | OUTPATIENT
Start: 2022-11-05 | End: 2022-11-06 | Stop reason: HOSPADM

## 2022-11-05 RX ADMIN — EMPAGLIFLOZIN 10 MG: 10 TABLET, FILM COATED ORAL at 08:06

## 2022-11-05 RX ADMIN — Medication 10 ML: at 21:33

## 2022-11-05 RX ADMIN — HEPARIN SODIUM 5000 UNITS: 5000 INJECTION INTRAVENOUS; SUBCUTANEOUS at 14:10

## 2022-11-05 RX ADMIN — METOPROLOL TARTRATE 12.5 MG: 25 TABLET, FILM COATED ORAL at 08:06

## 2022-11-05 RX ADMIN — METOPROLOL TARTRATE 12.5 MG: 25 TABLET, FILM COATED ORAL at 19:39

## 2022-11-05 RX ADMIN — ACETAMINOPHEN 650 MG: 325 TABLET, FILM COATED ORAL at 18:05

## 2022-11-05 RX ADMIN — INSULIN LISPRO 10 UNITS: 100 INJECTION, SOLUTION INTRAVENOUS; SUBCUTANEOUS at 12:29

## 2022-11-05 RX ADMIN — ATORVASTATIN CALCIUM 40 MG: 40 TABLET, FILM COATED ORAL at 19:39

## 2022-11-05 RX ADMIN — INSULIN LISPRO 3 UNITS: 100 INJECTION, SOLUTION INTRAVENOUS; SUBCUTANEOUS at 08:51

## 2022-11-05 RX ADMIN — ASPIRIN 81 MG: 81 TABLET, COATED ORAL at 08:06

## 2022-11-05 RX ADMIN — HEPARIN SODIUM 5000 UNITS: 5000 INJECTION INTRAVENOUS; SUBCUTANEOUS at 05:49

## 2022-11-05 RX ADMIN — FAMOTIDINE 20 MG: 20 TABLET ORAL at 18:05

## 2022-11-05 RX ADMIN — INSULIN LISPRO 5 UNITS: 100 INJECTION, SOLUTION INTRAVENOUS; SUBCUTANEOUS at 21:33

## 2022-11-05 RX ADMIN — MAGNESIUM SULFATE HEPTAHYDRATE 4 G: 40 INJECTION, SOLUTION INTRAVENOUS at 08:14

## 2022-11-05 RX ADMIN — CLOPIDOGREL BISULFATE 75 MG: 75 TABLET ORAL at 08:06

## 2022-11-05 RX ADMIN — INSULIN LISPRO 5 UNITS: 100 INJECTION, SOLUTION INTRAVENOUS; SUBCUTANEOUS at 12:30

## 2022-11-05 RX ADMIN — HYDROCODONE BITARTRATE AND ACETAMINOPHEN 1 TABLET: 7.5; 325 TABLET ORAL at 20:04

## 2022-11-05 RX ADMIN — INSULIN LISPRO 3 UNITS: 100 INJECTION, SOLUTION INTRAVENOUS; SUBCUTANEOUS at 18:05

## 2022-11-05 RX ADMIN — Medication 10 ML: at 19:42

## 2022-11-05 RX ADMIN — INSULIN LISPRO 10 UNITS: 100 INJECTION, SOLUTION INTRAVENOUS; SUBCUTANEOUS at 08:50

## 2022-11-05 RX ADMIN — GABAPENTIN 100 MG: 100 CAPSULE ORAL at 12:25

## 2022-11-05 RX ADMIN — HEPARIN SODIUM 5000 UNITS: 5000 INJECTION INTRAVENOUS; SUBCUTANEOUS at 20:04

## 2022-11-05 RX ADMIN — INSULIN DETEMIR 30 UNITS: 100 INJECTION, SOLUTION SUBCUTANEOUS at 08:06

## 2022-11-05 RX ADMIN — GABAPENTIN 100 MG: 100 CAPSULE ORAL at 03:07

## 2022-11-05 RX ADMIN — GABAPENTIN 100 MG: 100 CAPSULE ORAL at 19:39

## 2022-11-05 RX ADMIN — Medication 10 ML: at 08:08

## 2022-11-05 RX ADMIN — SENNOSIDES AND DOCUSATE SODIUM 2 TABLET: 50; 8.6 TABLET ORAL at 19:41

## 2022-11-05 RX ADMIN — ACETAMINOPHEN 650 MG: 325 TABLET, FILM COATED ORAL at 12:25

## 2022-11-05 RX ADMIN — FAMOTIDINE 20 MG: 20 TABLET ORAL at 08:06

## 2022-11-05 RX ADMIN — INSULIN LISPRO 10 UNITS: 100 INJECTION, SOLUTION INTRAVENOUS; SUBCUTANEOUS at 18:05

## 2022-11-05 RX ADMIN — LEVOTHYROXINE SODIUM 175 MCG: 150 TABLET ORAL at 05:49

## 2022-11-05 NOTE — PLAN OF CARE
Goal Outcome Evaluation:  VSS. Pt walked once, 200 feet. Ordered to tele. Afebrile. No BM. Mom at bedside. Pt states she is ready to go home.

## 2022-11-05 NOTE — PROGRESS NOTES
"[facility] at [facility]  IP Progress Note    PROBLEM LIST:    CARDIAC:   a.  Coronary artery disease:   1.  Unstable angina, [facility], October 2022: Left main normal, ostial LAD 99% stenosis, rest of the arteries are normal   2. CABG x 1, 11/1/22: LIMA-LAD     b.  Myocardium:   1.  Echo, October 2022: LVEF normal   2. Echo,LVEF 60% with apical akinesia     c.  Valvular:   1.  No significant valvular disease     d.  Electrical:   1.  History of sick sinus syndrome s/p pacemaker placement at age 28   2. TIMA closure during CABG     e.  Percardium:   1.  Normal pericardium     CARDIAC RISK FACTORS:   1.  Type 2 diabetes   2.  Hyperlipidemia   3.  Family history of coronary artery disease   4.  Morbid obesity       NON CARDIAC:   1.  Thyroid cancer s/p thyroidectomy   2.  Fatty liver   3.  Vitamin D deficiency   4.  Headaches     SURGERIES:   1.  Thyroidectomy         Chief Complaint: Follow-up hypertension and hyperlipidemia    HOSPITAL COURSE:Patient admitted with non-ST segment elevation and underwent successful revascularization      Subjective   ***          Objective     Blood pressure 129/74, pulse 84, temperature 97.3 °F (36.3 °C), resp. rate 18, height 162.6 cm (64.02\"), weight 103 kg (227 lb 12.8 oz), SpO2 96 %, not currently breastfeeding.     Intake/Output Summary (Last 24 hours) at 11/5/2022 0645  Last data filed at 11/5/2022 0500  Gross per 24 hour   Intake 600 ml   Output 500 ml   Net 100 ml       PHYSICAL EXAM:  General: No acute distress.   Neck: no JVD.  Chest:No respiratory distress, breath sounds are normal. No wheezes,  rhonchi or rales.  Cardiovascular: Normal S1 and S2, no murmer, gallop or rub.    Extremities: No edema.    RESULR REVIEW:    {Results Review:71323::\"I reviewed the patient's new clinical results.\"}      MEDICATIONS:    acetaminophen, 650 mg, Oral, Q8H  aspirin, 81 mg, Oral, Daily  atorvastatin, 40 mg, Oral, Nightly  clopidogrel, 75 mg, Oral, Daily  empagliflozin, " 10 mg, Oral, Daily  famotidine, 20 mg, Oral, BID AC  gabapentin, 100 mg, Oral, Q8H  heparin (porcine), 5,000 Units, Subcutaneous, Q8H  insulin detemir, 30 Units, Subcutaneous, Daily  insulin lispro, 0-14 Units, Subcutaneous, 4x Daily With Meals & Nightly  Insulin Lispro, 10 Units, Subcutaneous, TID With Meals  levothyroxine, 175 mcg, Oral, Q AM  metoprolol tartrate, 12.5 mg, Oral, Q12H  pharmacy consult - [facility], , Does not apply, Daily  senna-docusate sodium, 2 tablet, Oral, BID  sodium chloride, 10 mL, Intravenous, Q12H        Results from last 7 days   Lab Units 11/05/22  0304   WBC 10*3/mm3 14.48*   HEMOGLOBIN g/dL 11.5*   HEMATOCRIT % 34.8   PLATELETS 10*3/mm3 139*     Results from last 7 days   Lab Units 11/05/22  0304   SODIUM mmol/L 133*   POTASSIUM mmol/L 3.9   CHLORIDE mmol/L 96*   CO2 mmol/L 24.0   BUN mg/dL 26*   CREATININE mg/dL 0.89   CALCIUM mg/dL 8.9   BILIRUBIN mg/dL 0.3   ALK PHOS U/L 86   ALT (SGPT) U/L 11   AST (SGOT) U/L 15   GLUCOSE mg/dL 171*     Results from last 7 days   Lab Units 11/02/22  0235 11/01/22  1753   INR  1.10 1.32*     Lab Results   Component Value Date    TROPONINT 0.015 10/27/2022    TROPONINT 0.013 10/27/2022     Results from last 7 days   Lab Units 10/29/22  1410 10/29/22  0739   TSH uIU/mL  --  4.980*   FREE T4 ng/dL 1.82*  --              No results found for: IRON, FERRITIN, LABIRON, TIBC   Hemoglobin A1C   Date Value Ref Range Status   10/27/2022 11.80 (H) 4.80 - 5.60 % Final     Magnesium   Date Value Ref Range Status   11/05/2022 1.9 1.6 - 2.6 mg/dL Final        Tele: NSR      Assessment:     1. POD# 4 CABG X 1  2. Hypertension  3. Hyperlipidemia  4. DM        Plan:     1. ***            Antonia Navarro, RN, BSN

## 2022-11-05 NOTE — PROGRESS NOTES
"Tawanna Huang  [id number]  1980     LOS: 9 days   Patient Care Team:  Bobby Kiser FNP as PCP - General (Nurse Practitioner)  Micky Irby MD as Consulting Physician (Endocrinology)    Chief Complaint: Coronary artery disease      Subjective: No complaints, sitting in chair    Objective:     Vital Sign Min/Max for last 24 hours  Temp  Min: 97.3 °F (36.3 °C)  Max: 99.2 °F (37.3 °C)   BP  Min: 94/62  Max: 129/74   Pulse  Min: 74  Max: 88   Resp  Min: 16  Max: 30   SpO2  Min: 93 %  Max: 100 %   No data recorded   No data recorded     Flowsheet Rows    Flowsheet Row First Filed Value   Admission Height 162.6 cm (64\") Documented at 10/27/2022 1850   Admission Weight 104 kg (229 lb 12.8 oz) Documented at 10/27/2022 1850          Physical Exam:    Wound: Satisfactory  Pulses:     Mediastinal and Chest Tube Drainage:       Results Review:   Results from last 7 days   Lab Units 11/05/22  0304   WBC 10*3/mm3 14.48*   HEMOGLOBIN g/dL 11.5*   HEMATOCRIT % 34.8   PLATELETS 10*3/mm3 139*     Results from last 7 days   Lab Units 11/05/22  0304   SODIUM mmol/L 133*   POTASSIUM mmol/L 3.9   CHLORIDE mmol/L 96*   CO2 mmol/L 24.0   BUN mg/dL 26*   CREATININE mg/dL 0.89   GLUCOSE mg/dL 171*   CALCIUM mg/dL 8.9     Results from last 7 days   Lab Units 11/01/22  2310   PH, ARTERIAL pH units 7.310*   PO2 ART mm Hg 118.0*   PCO2, ARTERIAL mm Hg 46.4*   HCO3 ART mmol/L 23.3         Assessment      MV CAD     Tachy-gilma syndrome s/p PPM     Acquired hypothyroidism- H/O thyroid cancer s/p thryoidectomy 2012    T2DM on insulin and metformin     Class 2 obesity in adult    Constipation    Marijuana use (UDS + THC)       Doing satisfactory.  Await transfer to telemetry.        Leland Jo MD  11/05/22  07:10 EDT      Please note that portions of this note were completed with a voice recognition program. Efforts were made to edit the dictations, but words may be mistranscribed  "

## 2022-11-05 NOTE — PROGRESS NOTES
Pulmonary/Critical Care Follow-up     LOS: 9 days   Patient Care Team:  Bobby Kiser FNP as PCP - General (Nurse Practitioner)  Micky Irby MD as Consulting Physician (Endocrinology)    Chief Complaint: Chest pain/medical management of issues postoperatively after CABG including but not limited to: Respiratory, electrolyte, and glycemic management.      Subjective     Interval History:   Tawanna Huang is a 42 y.o. female with PMH of T2DM, thyroid CA s/p thyroidectomy, tachy-gilma syndrome s/p PPM admitted to [facility] on 10/27 from [facility] for NSTEMI and CABG evaluation.     The patient presented to the OSH on 10/21 with complaints of intermittent chest pain with acute worsening prompting ED evaluation and EKG consistent with NSTEMI. LHC displayed >90% LAD stenosis with an EF of 50%. She was transferred to [facility] and evaluated by CTS who recommended surgical revascularization and underwent pre-operative work-up. She has been maintained on a Heparin gtt and ASA. TTE results are pending.     UDS + THC.     CXR shows reduced lung volumes. Pre-op PFTs as follows: FVC 2.36, 64%; FEV1 2.05, 69%; and ratio 0.87.     She underwent CABG x1 (LIMA to LAD) with left atrial appendage ligation, by Dr. Lane on 11/1/2022.     Interval history:  Patient currently on room air.  She has some oxygen desaturation when she falls asleep.  She reports a previous history of sleep apnea on CPAP but has not been on CPAP in about 20 years.  Otherwise patient is doing well.  Blood sugars adequately controlled.  Afebrile.  Pain adequately controlled.  Denies dyspnea.  No new complaints.  Ordered to telemetry.    History taken from: chart     PMH/FH/Social History were reviewed and updated appropriately in the electronic medical record.     Review of Systems:    Review of 14 systems was completed with positives and pertinent negatives noted in the subjective section.  All other systems reviewed and are negative.         Objective      Vital Signs  Temp:  [97.3 °F (36.3 °C)-98.2 °F (36.8 °C)] 97.7 °F (36.5 °C)  Heart Rate:  [75-88] 79  Resp:  [16-20] 18  BP: (101-134)/(55-80) 116/76  11/04 0701 - 11/05 0700  In: 840 [P.O.:840]  Out: 500 [Urine:500]  Body mass index is 39.08 kg/m².     Physical Exam:     Constitutional:   Sleeping but arousable, in no acute distress   Head:    Normocephalic, without obvious abnormality, atraumatic   Eyes:            Lids and lashes normal   ENMT:   Ears appear intact with no abnormalities noted       Neck:   No adenopathy, trachea midline, right internal jugular catheter in place       Lungs/Resp:    Normal effort, symmetric chest rise, no crepitus, clear bilaterally, left upper chest pacemaker.                Heart/CV:    Regular rhythm and normal rate, normal S1 and S2, no            murmur   Abdomen/GI:     Normal bowel sounds, no masses, no organomegaly, soft,        non-tender, non-distended   :     Deferred   Extremities/MSK:   No clubbing or cyanosis.  No edema.  Normal tone.  No deformities.    Pulses:   Pulses palpable and equal bilaterally   Skin:   No bleeding, bruising or rash   Heme/Lymph:   No cervical or supraclavicular adenopathy.    Neurologic:    Psychiatric:     Moves all extremities without obvious focal motor deficit.  Awake and follows commands.  Nonagitated.         Above findings represent my personal examination findings from today.  Electronically signed by:Hiren Hays MD 11/05/22 14:07 EDT     Results Review:     I reviewed the patient's new clinical results.   Results from last 7 days   Lab Units 11/05/22  0304 11/04/22  0303 11/03/22  0243   SODIUM mmol/L 133* 129* 132*   POTASSIUM mmol/L 3.9 4.2 4.7   CHLORIDE mmol/L 96* 95* 97*   CO2 mmol/L 24.0 24.0 22.0   BUN mg/dL 26* 21* 16   CREATININE mg/dL 0.89 0.83 0.80   CALCIUM mg/dL 8.9 8.6 9.0   BILIRUBIN mg/dL 0.3 0.3 0.3   ALK PHOS U/L 86 70 65   ALT (SGPT) U/L 11 8 13   AST (SGOT) U/L 15 15 19   GLUCOSE mg/dL 171* 212* 119*      Results from last 7 days   Lab Units 11/05/22  0304 11/04/22  0303 11/03/22  0243   WBC 10*3/mm3 14.48* 16.23* 24.79*   HEMOGLOBIN g/dL 11.5* 10.7* 11.6*   HEMATOCRIT % 34.8 33.2* 35.2   PLATELETS 10*3/mm3 139* 95* 115*     Results from last 7 days   Lab Units 11/01/22  2310   PH, ARTERIAL pH units 7.310*   PO2 ART mm Hg 118.0*   PCO2, ARTERIAL mm Hg 46.4*   HCO3 ART mmol/L 23.3     Results from last 7 days   Lab Units 11/05/22  0304 11/04/22  0303 11/03/22  0243 11/02/22  0235 11/01/22  2133 11/01/22  1753   MAGNESIUM mg/dL 1.9 2.1 1.9 2.4  --  1.8   PHOSPHORUS mg/dL  --   --   --  6.1* 6.0* 4.4             I reviewed the patient's new imaging including images and reports.  Chest x-ray 11/2/2022: Interval extubation.  Persistent low lung volumes and basilar atelectasis.  Postoperative changes without obvious pneumothorax.     Medication Review:   acetaminophen, 650 mg, Oral, Q8H  aspirin, 81 mg, Oral, Daily  atorvastatin, 40 mg, Oral, Nightly  clopidogrel, 75 mg, Oral, Daily  empagliflozin, 10 mg, Oral, Daily  famotidine, 20 mg, Oral, BID AC  gabapentin, 100 mg, Oral, Q8H  heparin (porcine), 5,000 Units, Subcutaneous, Q8H  insulin detemir, 30 Units, Subcutaneous, Daily  insulin lispro, 0-14 Units, Subcutaneous, 4x Daily With Meals & Nightly  Insulin Lispro, 10 Units, Subcutaneous, TID With Meals  levothyroxine, 175 mcg, Oral, Q AM  metoprolol tartrate, 12.5 mg, Oral, Q12H  pharmacy consult - [place], , Does not apply, Daily  senna-docusate sodium, 2 tablet, Oral, BID  sodium chloride, 10 mL, Intravenous, Q12H           Assessment & Plan       MV CAD     Tachy-gilma syndrome s/p PPM     Acquired hypothyroidism- H/O thyroid cancer s/p thryoidectomy 2012    T2DM on insulin and metformin     Class 2 obesity in adult    Constipation    Marijuana use (UDS + THC)     42 y.o. female with history of poorly controlled diabetes (hemoglobin A1c 11.8% 10/27/2022), thyroid cancer status post thyroidectomy, tachybradycardia  syndrome status post permanent pacemaker, admitted to [facility] after NSTEMI for CABG evaluation.  She was found to have significant LAD disease with proximal disease not amenable to stent.  Urine drug screen was positive for THC.    Patient underwent CABG x1 with left atrial appendage clip by Dr. Lane on 11/1/2022.  Doing well from a respiratory standpoint.  Chest tubes out.  Adequate blood sugars on subcutaneous protocol.  Tolerating diet.  Okay to telemetry from pulmonary standpoint.  Continue monitoring oxygen saturation.  Patient may ultimately benefit from a repeat sleep study and initiation of CPAP if indicated.    Plan:  1.  For coronary artery disease: Status post CABG x1 on 11/1/2022: Aspirin, statin, beta-blocker as tolerated.  Postoperative surgical management per CT surgery.  Cardiology following.  2.  For uncontrolled diabetes: Continue subcutaneous protocol with basal plus prandial and correction insulin.   3.  For acquired hypothyroidism: Continue levothyroxine.  Hospitalist had reduced dose from 200 mcg 175 mcg and we will continue this with plan to follow-up with PCP in 6 weeks with repeat labs.  4.  DVT prophylaxis: Subcutaneous heparin.  5.  Respiratory: Supplemental oxygen as needed.  Encourage incentive spirometer use.  Mobilize.   6.  For possible ARIEL: She reports a history of ARIEL in the past.  She may benefit from a repeat sleep study and CPAP if indicated.  Discussed with patient.  Monitor oxygen saturations.  7.  We will follow while in the intensive care unit.  Okay to telemetry when okay with CT surgery.      Electronically signed by:  Hiren Hays MD  11/05/22  14:07 EDT

## 2022-11-06 VITALS
SYSTOLIC BLOOD PRESSURE: 121 MMHG | TEMPERATURE: 96.7 F | DIASTOLIC BLOOD PRESSURE: 77 MMHG | OXYGEN SATURATION: 97 % | WEIGHT: 234.2 LBS | BODY MASS INDEX: 39.98 KG/M2 | HEART RATE: 85 BPM | RESPIRATION RATE: 16 BRPM | HEIGHT: 64 IN

## 2022-11-06 LAB
ALBUMIN SERPL-MCNC: 3.2 G/DL (ref 3.5–5.2)
ALBUMIN/GLOB SERPL: 0.8 G/DL
ALP SERPL-CCNC: 81 U/L (ref 39–117)
ALT SERPL W P-5'-P-CCNC: 14 U/L (ref 1–33)
ANION GAP SERPL CALCULATED.3IONS-SCNC: 13 MMOL/L (ref 5–15)
AST SERPL-CCNC: 14 U/L (ref 1–32)
BASOPHILS # BLD AUTO: 0.12 10*3/MM3 (ref 0–0.2)
BASOPHILS NFR BLD AUTO: 0.9 % (ref 0–1.5)
BILIRUB SERPL-MCNC: 0.3 MG/DL (ref 0–1.2)
BUN SERPL-MCNC: 23 MG/DL (ref 6–20)
BUN/CREAT SERPL: 43.4 (ref 7–25)
CALCIUM SPEC-SCNC: 8.7 MG/DL (ref 8.6–10.5)
CHLORIDE SERPL-SCNC: 101 MMOL/L (ref 98–107)
CO2 SERPL-SCNC: 23 MMOL/L (ref 22–29)
CREAT SERPL-MCNC: 0.53 MG/DL (ref 0.57–1)
DEPRECATED RDW RBC AUTO: 43.6 FL (ref 37–54)
EGFRCR SERPLBLD CKD-EPI 2021: 118.6 ML/MIN/1.73
EOSINOPHIL # BLD AUTO: 0.31 10*3/MM3 (ref 0–0.4)
EOSINOPHIL NFR BLD AUTO: 2.4 % (ref 0.3–6.2)
ERYTHROCYTE [DISTWIDTH] IN BLOOD BY AUTOMATED COUNT: 13.6 % (ref 12.3–15.4)
GLOBULIN UR ELPH-MCNC: 3.9 GM/DL
GLUCOSE BLDC GLUCOMTR-MCNC: 233 MG/DL (ref 70–130)
GLUCOSE SERPL-MCNC: 161 MG/DL (ref 65–99)
HCT VFR BLD AUTO: 34.9 % (ref 34–46.6)
HGB BLD-MCNC: 11.3 G/DL (ref 12–15.9)
IMM GRANULOCYTES # BLD AUTO: 0.3 10*3/MM3 (ref 0–0.05)
IMM GRANULOCYTES NFR BLD AUTO: 2.3 % (ref 0–0.5)
LYMPHOCYTES # BLD AUTO: 2.22 10*3/MM3 (ref 0.7–3.1)
LYMPHOCYTES NFR BLD AUTO: 17.2 % (ref 19.6–45.3)
MAGNESIUM SERPL-MCNC: 2.2 MG/DL (ref 1.6–2.6)
MCH RBC QN AUTO: 28.5 PG (ref 26.6–33)
MCHC RBC AUTO-ENTMCNC: 32.4 G/DL (ref 31.5–35.7)
MCV RBC AUTO: 87.9 FL (ref 79–97)
MONOCYTES # BLD AUTO: 0.9 10*3/MM3 (ref 0.1–0.9)
MONOCYTES NFR BLD AUTO: 7 % (ref 5–12)
NEUTROPHILS NFR BLD AUTO: 70.2 % (ref 42.7–76)
NEUTROPHILS NFR BLD AUTO: 9.08 10*3/MM3 (ref 1.7–7)
NRBC BLD AUTO-RTO: 0 /100 WBC (ref 0–0.2)
PLATELET # BLD AUTO: 180 10*3/MM3 (ref 140–450)
PMV BLD AUTO: 13 FL (ref 6–12)
POTASSIUM SERPL-SCNC: 4 MMOL/L (ref 3.5–5.2)
PROT SERPL-MCNC: 7.1 G/DL (ref 6–8.5)
RBC # BLD AUTO: 3.97 10*6/MM3 (ref 3.77–5.28)
SODIUM SERPL-SCNC: 137 MMOL/L (ref 136–145)
TSH SERPL DL<=0.05 MIU/L-ACNC: 0.91 UIU/ML (ref 0.27–4.2)
WBC NRBC COR # BLD: 12.93 10*3/MM3 (ref 3.4–10.8)

## 2022-11-06 PROCEDURE — 25010000002 HEPARIN (PORCINE) PER 1000 UNITS: Performed by: INTERNAL MEDICINE

## 2022-11-06 PROCEDURE — 63710000001 INSULIN DETEMIR PER 5 UNITS: Performed by: INTERNAL MEDICINE

## 2022-11-06 PROCEDURE — 80053 COMPREHEN METABOLIC PANEL: CPT | Performed by: STUDENT IN AN ORGANIZED HEALTH CARE EDUCATION/TRAINING PROGRAM

## 2022-11-06 PROCEDURE — 63710000001 INSULIN LISPRO (HUMAN) PER 5 UNITS: Performed by: INTERNAL MEDICINE

## 2022-11-06 PROCEDURE — 99239 HOSP IP/OBS DSCHRG MGMT >30: CPT | Performed by: INTERNAL MEDICINE

## 2022-11-06 PROCEDURE — 82962 GLUCOSE BLOOD TEST: CPT

## 2022-11-06 PROCEDURE — 83735 ASSAY OF MAGNESIUM: CPT | Performed by: STUDENT IN AN ORGANIZED HEALTH CARE EDUCATION/TRAINING PROGRAM

## 2022-11-06 PROCEDURE — 84443 ASSAY THYROID STIM HORMONE: CPT | Performed by: PHYSICIAN ASSISTANT

## 2022-11-06 PROCEDURE — 85025 COMPLETE CBC W/AUTO DIFF WBC: CPT | Performed by: STUDENT IN AN ORGANIZED HEALTH CARE EDUCATION/TRAINING PROGRAM

## 2022-11-06 RX ORDER — HYDROCODONE BITARTRATE AND ACETAMINOPHEN 7.5; 325 MG/1; MG/1
1 TABLET ORAL EVERY 6 HOURS PRN
Qty: 30 TABLET | Refills: 0 | Status: SHIPPED | OUTPATIENT
Start: 2022-11-06 | End: 2022-11-08

## 2022-11-06 RX ORDER — LEVOTHYROXINE SODIUM 175 UG/1
175 TABLET ORAL
Qty: 30 TABLET | Refills: 0 | Status: SHIPPED | OUTPATIENT
Start: 2022-11-07

## 2022-11-06 RX ORDER — ASPIRIN 81 MG/1
81 TABLET ORAL DAILY
Qty: 100 TABLET | Refills: 5 | Status: SHIPPED | OUTPATIENT
Start: 2022-11-06

## 2022-11-06 RX ORDER — ATORVASTATIN CALCIUM 40 MG/1
40 TABLET, FILM COATED ORAL NIGHTLY
Qty: 90 TABLET | Refills: 0 | Status: SHIPPED | OUTPATIENT
Start: 2022-11-06

## 2022-11-06 RX ORDER — CLOPIDOGREL BISULFATE 75 MG/1
75 TABLET ORAL DAILY
Qty: 30 TABLET | Refills: 5 | Status: SHIPPED | OUTPATIENT
Start: 2022-11-06

## 2022-11-06 RX ADMIN — Medication 10 ML: at 11:59

## 2022-11-06 RX ADMIN — INSULIN LISPRO 10 UNITS: 100 INJECTION, SOLUTION INTRAVENOUS; SUBCUTANEOUS at 09:42

## 2022-11-06 RX ADMIN — METOPROLOL TARTRATE 12.5 MG: 25 TABLET, FILM COATED ORAL at 09:41

## 2022-11-06 RX ADMIN — FAMOTIDINE 20 MG: 20 TABLET ORAL at 09:41

## 2022-11-06 RX ADMIN — INSULIN LISPRO 10 UNITS: 100 INJECTION, SOLUTION INTRAVENOUS; SUBCUTANEOUS at 11:58

## 2022-11-06 RX ADMIN — HYDROCODONE BITARTRATE AND ACETAMINOPHEN 1 TABLET: 7.5; 325 TABLET ORAL at 11:58

## 2022-11-06 RX ADMIN — EMPAGLIFLOZIN 10 MG: 10 TABLET, FILM COATED ORAL at 09:42

## 2022-11-06 RX ADMIN — HYDROCODONE BITARTRATE AND ACETAMINOPHEN 1 TABLET: 7.5; 325 TABLET ORAL at 03:15

## 2022-11-06 RX ADMIN — GABAPENTIN 100 MG: 100 CAPSULE ORAL at 03:15

## 2022-11-06 RX ADMIN — CLOPIDOGREL BISULFATE 75 MG: 75 TABLET ORAL at 09:42

## 2022-11-06 RX ADMIN — INSULIN LISPRO 5 UNITS: 100 INJECTION, SOLUTION INTRAVENOUS; SUBCUTANEOUS at 09:42

## 2022-11-06 RX ADMIN — LEVOTHYROXINE SODIUM 175 MCG: 150 TABLET ORAL at 05:59

## 2022-11-06 RX ADMIN — GABAPENTIN 100 MG: 100 CAPSULE ORAL at 11:58

## 2022-11-06 RX ADMIN — HEPARIN SODIUM 5000 UNITS: 5000 INJECTION INTRAVENOUS; SUBCUTANEOUS at 05:59

## 2022-11-06 RX ADMIN — ASPIRIN 81 MG: 81 TABLET, COATED ORAL at 09:42

## 2022-11-06 RX ADMIN — INSULIN DETEMIR 30 UNITS: 100 INJECTION, SOLUTION SUBCUTANEOUS at 09:43

## 2022-11-06 NOTE — CASE MANAGEMENT/SOCIAL WORK
Continued Stay Note  [facility]     Patient Name: Tawanna Huang  MRN: [id number]  Today's Date: 11/6/2022    Admit Date: 10/27/2022    Plan: Home   Discharge Plan     Row Name 11/06/22 0841       Plan    Plan Home    Patient/Family in Agreement with Plan yes    Plan Comments DC order in place. No needs. Plan is home and boyfriend will transport.    Final Discharge Disposition Code 01 - home or self-care               Discharge Codes    No documentation.               Expected Discharge Date and Time     Expected Discharge Date Expected Discharge Time    Nov 6, 2022             Lina Chung RN

## 2022-11-06 NOTE — PROGRESS NOTES
5 Days Post-Op       LOS: 10 days   Patient Care Team:  Bobby Kiser FNP as PCP - General (Nurse Practitioner)  Micky Irby MD as Consulting Physician (Endocrinology)    Chief complaint:    Subjective   Denies chest pain, denies shortness of breath    Objective    Vital Signs  Temp:  [96.5 °F (35.8 °C)-98.5 °F (36.9 °C)] 97.4 °F (36.3 °C)  Heart Rate:  [] 73  Resp:  [16-20] 16  BP: (102-127)/(61-91) 124/80    Physical Exam:   General Appearance: alert, appears stated age and cooperative   Lungs: clear bilaterally   Heart: Regular rate and rhythm   Skin:  Incision c/d/i     Results   Results from last 7 days   Lab Units 11/05/22  0304   WBC 10*3/mm3 14.48*   HEMOGLOBIN g/dL 11.5*   HEMATOCRIT % 34.8   PLATELETS 10*3/mm3 139*     Results from last 7 days   Lab Units 11/05/22  0304   SODIUM mmol/L 133*   POTASSIUM mmol/L 3.9   CHLORIDE mmol/L 96*   CO2 mmol/L 24.0   BUN mg/dL 26*   CREATININE mg/dL 0.89   GLUCOSE mg/dL 171*   CALCIUM mg/dL 8.9               Assessment      MV CAD     Tachy-gilma syndrome s/p PPM     Acquired hypothyroidism- H/O thyroid cancer s/p thryoidectomy 2012    T2DM on insulin and metformin     Class 2 obesity in adult    Constipation    Marijuana use (UDS + THC)         Plan   Home soon  Continue Synthroid and check a TSH  Continue statin beta-blockers aspirin and Plavix        Torres Spaulding PA-C  11/06/22  07:29 EST

## 2022-11-06 NOTE — DISCHARGE SUMMARY
[facility] Medicine Services  DISCHARGE SUMMARY    Patient Name: Tawanna Huang  : 1980  MRN: [id number]    Date of Admission: 10/27/2022  5:04 PM  Date of Discharge:  2022  Primary Care Physician: Bobby Kiser FNP    Consults     Date and Time Order Name Status Description    2022  9:28 PM Inpatient Consult to Hospitalist HEDY for Medical Management      2022  5:42 PM Inpatient Consult to Cardiology Completed     10/28/2022  5:45 AM Inpatient Cardiology Consult Completed     10/28/2022 12:34 AM Inpatient Cardiothoracic Surgery Consult Completed           Hospital Course     Presenting Problem:   CAD (coronary artery disease) [I25.10]  NSTEMI (non-ST elevated myocardial infarction) (HCC) [I21.4]  Coronary artery disease [I25.10]    Active Hospital Problems    Diagnosis  POA   • **MV CAD  [I25.10]  Yes   • Marijuana use (UDS + THC)  [F12.90]  Yes   • Constipation [K59.00]  Unknown   • Acquired hypothyroidism- H/O thyroid cancer s/p thryoidectomy  [E03.9]  Yes   • T2DM on insulin and metformin  [E11.65, Z79.4]  Not Applicable   • Class 2 obesity in adult [E66.9]  Yes   • Tachy-gilma syndrome s/p PPM  [Z95.0]  Yes      Resolved Hospital Problems    Diagnosis Date Resolved POA   • ARIEL (obstructive sleep apnea) [G47.33] 10/27/2022 Yes          Hospital Course:  Tawanna Huang is a 42 y.o. female  with PMH of poorly controlled T2DM, thyroid CA s/p thyroidectomy, tachy-gilma syndrome s/p PPM admitted to [facility] on 10/27 from [facility] for NSTEMI and CABG evaluation. [facility] displayed >90% LAD stenosis not amendable to stenting with an EF of 50%. She was transferred to [facility] and evaluated by CTS who recommended surgical revascularization. She underwent CABG x1 with TIMA clipping by Dr. Lane on 22.     CAD s/p CABG x 1 22  - continue ASA, statin, plavix, beta-blocker  - outpatient f/u with Dr. lane in 2 weeks    Uncontrolled DMII -A1c 11.8%  - we  discussed lifestyle modifications, especially her diet  - resume lantus 30 units daily, metformin. She is aware to titrate her lantus up 10 units if her blood sugars continue to stay 300-400's.  - started jardiance   -recommend outpatient endocrine referral    Acquired hypothyroidism:  - dose of synthroid reduced to 175mcg, f/u TFTs in 6 weeks     Anxiety:  - continue home buspar, sertraline    Discharge Follow Up Recommendations for outpatient labs/diagnostics:  - f/u with PCP in one week, suggest endocrinology referral at that time  - f/u with Dr. Lane in 2 weeks    Day of Discharge     HPI:   Patient feels good, ready to go home. We discussed her home insulin regimen, she states that her blood sugars generally run in the 300-400's at home. She was told by PCP to increase lantus by 10units for better control. She does admit to eating fast food several times a week due to the nature of her job. We had discussion about the importance of lifestyle modifications going forward, especially in regards to her diet and her diabetes control.    Review of Systems  Gen- No fevers, chills  CV- No chest pain, palpitations  Resp- No cough, dyspnea  GI- No N/V/D, abd pain    Vital Signs:   Temp:  [96.5 °F (35.8 °C)-98.5 °F (36.9 °C)] 96.7 °F (35.9 °C)  Heart Rate:  [] 85  Resp:  [16-20] 16  BP: (113-127)/(61-91) 121/77      Physical Exam:  Constitutional: No acute distress, awake, alert  HENT: NCAT, mucous membranes moist  Respiratory: Clear to auscultation bilaterally, respiratory effort normal   Cardiovascular: RRR, no murmurs, rubs, or gallops  Gastrointestinal:  soft, nontender, nondistended  Musculoskeletal: No bilateral ankle edema  Psychiatric: Appropriate affect, cooperative  Neurologic: Oriented x 3, speech clear  Skin: No rashes      Pertinent  and/or Most Recent Results     LAB RESULTS:      Lab 11/06/22  0723 11/05/22  0304 11/04/22  0303 11/03/22  0243 11/02/22  0235 11/01/22  2133 11/01/22  1922  11/01/22  0657 10/31/22  1559 10/31/22  0613 10/30/22  2153 10/30/22  1522   WBC 12.93* 14.48* 16.23* 24.79* 26.41*   < > 18.25*   < >  --  13.48*  --   --    HEMOGLOBIN 11.3* 11.5* 10.7* 11.6* 12.2   < > 12.0   < >  --  13.9  --   --    HEMOGLOBIN, POC  --   --   --   --   --   --   --    < >  --   --   --   --    HEMATOCRIT 34.9 34.8 33.2* 35.2 37.2   < > 35.7   < >  --  42.6  --   --    HEMATOCRIT POC  --   --   --   --   --   --   --    < >  --   --   --   --    PLATELETS 180 139* 95* 115* 137*   < > 78*   < >  --  136*  --   --    NEUTROS ABS 9.08* 11.50* 12.87* 20.89* 22.48*  --   --    < >  --  9.24*  --   --    IMMATURE GRANS (ABS) 0.30* 0.20* 0.19* 0.27* 0.47*  --   --    < >  --  0.19*  --   --    LYMPHS ABS 2.22 1.66 1.67 1.33 1.68  --   --    < >  --  2.93  --   --    MONOS ABS 0.90 0.93* 1.36* 2.19* 1.54*  --   --    < >  --  0.73  --   --    EOS ABS 0.31 0.10 0.07 0.00 0.07  --   --    < >  --  0.29  --   --    MCV 87.9 85.9 87.8 87.3 85.7   < > 84.8   < >  --  86.1  --   --    PROTIME  --   --   --   --  14.1  --  16.3*  --   --   --   --   --    APTT  --   --   --   --   --   --  37.7  --   --   --   --   --    HEPARIN ANTI-XA  --   --   --   --   --   --   --   --  0.33 0.34 0.26* 0.32    < > = values in this interval not displayed.         Lab 11/06/22  0723 11/05/22  0304 11/04/22  0303 11/03/22  0243 11/02/22  0235 11/01/22  2133 11/01/22  1753   SODIUM 137 133* 129* 132* 137 139 136   POTASSIUM 4.0 3.9 4.2 4.7 4.8 4.9 3.6   CHLORIDE 101 96* 95* 97* 103 105 102   CO2 23.0 24.0 24.0 22.0 20.0* 22.0 23.0   ANION GAP 13.0 13.0 10.0 13.0 14.0 12.0 11.0   BUN 23* 26* 21* 16 13 14 14   CREATININE 0.53* 0.89 0.83 0.80 0.72 0.74 0.74   EGFR 118.6 83.1 90.4 94.5 107.2 103.7 103.7   GLUCOSE 161* 171* 212* 119* 191* 174* 179*   CALCIUM 8.7 8.9 8.6 9.0 9.3 9.9 10.2   IONIZED CALCIUM  --   --   --   --   --   --  1.45*   MAGNESIUM 2.2 1.9 2.1 1.9 2.4  --  1.8   PHOSPHORUS  --   --   --   --  6.1* 6.0* 4.4    TSH 0.906  --   --   --   --   --   --          Lab 11/06/22  0723 11/05/22  0304 11/04/22  0303 11/03/22  0243 11/02/22  0235   TOTAL PROTEIN 7.1 7.4 6.7 7.3 6.9   ALBUMIN 3.20* 3.80 3.60 4.20 4.60   GLOBULIN 3.9 3.6 3.1 3.1 2.3   ALT (SGPT) 14 11 8 13 19   AST (SGOT) 14 15 15 19 31   BILIRUBIN 0.3 0.3 0.3 0.3 0.4   ALK PHOS 81 86 70 65 64         Lab 11/02/22  0235 11/01/22  1753   PROTIME 14.1 16.3*   INR 1.10 1.32*             Lab 10/31/22  0929   ABO TYPING AB   RH TYPING Positive   ANTIBODY SCREEN Negative         Lab 11/01/22  2310 11/01/22  2238 11/01/22  1750   PH, ARTERIAL 7.310* 7.306* 7.336*   PCO2, ARTERIAL 46.4* 46.9* 46.0*   PO2 .0* 93.3 237.0*   FIO2 40 40 100   HCO3 ART 23.3 23.4 24.6   BASE EXCESS ART -3.1* -3.1* -1.5*   CARBOXYHEMOGLOBIN 1.0 1.0 0.7     Brief Urine Lab Results  (Last result in the past 365 days)      Color   Clarity   Blood   Leuk Est   Nitrite   Protein   CREAT   Urine HCG        10/31/22 2001               Negative       10/31/22 2001 Yellow   Clear   Moderate (2+)   Negative   Negative   Negative               Microbiology Results (last 10 days)     ** No results found for the last 240 hours. **          Adult Transthoracic Echo Complete W/ Cont if Necessary Per Protocol    Result Date: 11/2/2022  •  Left ventricular ejection fraction appears to be 56 - 60%. •  The following left ventricular wall segments are akinetic: apex. •  Left ventricular diastolic function was normal. •  Estimated right ventricular systolic pressure from tricuspid regurgitation is normal (<35 mmHg). Calculated right ventricular systolic pressure from tricuspid regurgitation is 33 mmHg.     Pulmonary Function Test Spirometry; COR/AMERICO/EMELI/AMRIT/MAD/PAD/ANA - DLCO    Result Date: 10/31/2022  October 28, 2022, spirometry FEV1 to FVC ratio 87%, no obstruction Absolute FEV1 is 2.05 L, 69%, FVC 2.36 L, 64% Possible restriction    XR Chest 1 View    Result Date: 11/5/2022   DATE OF EXAM: 11/5/2022 4:29 AM   PROCEDURE: XR CHEST 1 VW-  INDICATIONS: fluid; I25.110-Atherosclerotic heart disease of native coronary artery with unstable angina pectoris; Z00.6-Encounter for examination for normal comparison and control in clinical research program  COMPARISON: 01/03/2022 and prior  TECHNIQUE: Portable Chest  FINDINGS:  Patient is status post median sternotomy and CABG. There is a left atrial appendage clip in place. Left subclavian approach pacemaker is noted. Lung volumes are low with stable appearance of cardiac size. Pulmonary vascularity demonstrates mild vascular crowding, congestion. No overt pulmonary edema noted. Minimal dependent opacities at the lung bases left greater than right compatible with atelectasis. Findings are accentuated by low lung volumes. Osseous structures demonstrate no acute abnormality      IMPRESSION : Low lung volume portable chest with minimal dependent atelectasis[  This report was finalized on 11/5/2022 9:34 AM by Tank Howe.      XR Chest 1 View    Result Date: 11/3/2022  XR CHEST 1 VW-  Date of Exam: 11/3/2022 4:25 AM  Indication: Post-Op Heart Surgery; I25.110-Atherosclerotic heart disease of native coronary artery with unstable angina pectoris; Z00.6-Encounter for examination for normal comparison and control in clinical research program.  Comparison:?11/2/2022  Technique:?A single view of the chest was obtained.  FINDINGS:  ?Patient is again noted be status post median sternotomy.  Left subclavian transvenous pacemaker is unchanged.  In the interval the [place] catheters been removed.  Right internal jugular introducer sheath remains in place.  Heart size is within normal limits.  Pulmonary vessels appear normal.  Lung volumes are low.  There is persistent left basilar and bilateral perihilar atelectasis.  No definite pleural effusion.  No evidence pneumothorax.          1.  Interval removal of [place] catheter. 2.  Low volume inspiration with persistent bilateral atelectasis.    This report was finalized on 11/3/2022 7:49 AM by Jovany Mortensen MD.      XR Chest 1 View    Result Date: 11/2/2022  DATE OF EXAM: 11/2/2022 4:46 AM  PROCEDURE: XR CHEST 1 VW-  INDICATIONS: Post-Op Heart Surgery; I25.110-Atherosclerotic heart disease of native coronary artery with unstable angina pectoris; Z00.6-Encounter for examination for normal comparison and control in clinical research program.  COMPARISON: Chest x-ray 11/1/2022  TECHNIQUE: Single frontal view of the chest.  FINDINGS: Redemonstration of surgical changes of CABG. Interval extubation and removal of NG/OG tube. Otherwise stable medical support devices. Stable cardiomediastinal silhouette within normal limits. Mild worsening of persistent low lung volumes. There are streaky parenchymal opacities in the right mid and left lung base, likely atelectasis or bronchovascular crowding in the setting of low lung volumes. No significant pleural effusion. No definite pneumothorax.      Interval extubation and removal of NG/OG tube with otherwise stable medical support devices. Mild worsening of persistent low lung volumes with bibasilar atelectasis.  This report was finalized on 11/2/2022 9:35 AM by Nitin Salgado MD.      XR Chest 1 View    Result Date: 11/1/2022  XR CHEST 1 VW-  Date of Exam: 11/1/2022 5:46 PM  Indication: Post-Op Check Line & Tube Placement; I25.110-Atherosclerotic heart disease of native coronary artery with unstable angina pectoris; Z00.6-Encounter for examination for normal comparison and control in clinical research program.  Comparison:?10/31/2022  Technique:?A single view of the chest was obtained.  FINDINGS:  ?Patient is now status post median sternotomy.  Left subclavian transvenous pacemaker is unchanged.  Heart size is within normal limits.  There is nasogastric tube in place the tip below the diaphragm but not included within the field-of-view.  There is an endotracheal tube in place the tip approximately 2 7 m above the  aileen.  Right internal jugular [place]a catheter is in place the tip projecting in the expected location of the main pulmonary artery.  Lung volumes are low. There is no evidence pneumothorax.  Left basilar thoracostomy tube is in place.  There are no pleural effusions.  There are areas of linear atelectasis in the right perihilar region.          1.  Interval median sternotomy with multiple life-support tubes and lines in place which are in good position with no evidence of complication. 2.  Low volume inspiration with right perihilar atelectasis.   This report was finalized on 11/1/2022 6:03 PM by Jovany Mortensen MD.      XR Chest 1 View    Result Date: 10/31/2022   DATE OF EXAM: 10/31/2022 3:07 PM  PROCEDURE: XR CHEST 1 VW-  INDICATIONS: pre op; I25.110-Atherosclerotic heart disease of native coronary artery with unstable angina pectoris; Z00.6-Encounter for examination for normal comparison and control in clinical research program  COMPARISON: No Comparisons Available  TECHNIQUE: Portable chest radiograph.  FINDINGS:  The cardiomediastinal silhouette is within normal limits. The lungs are clear. There is no pneumothorax, focal consolidation, or large pleural effusion. Osseous structures grossly intact. Left-sided AICD noted.      No acute process.  This report was finalized on 10/31/2022 4:17 PM by Nadir Feng MD.      Duplex Carotid Ultrasound CAR    Result Date: 10/28/2022  •  Proximal right internal carotid artery is normal. •  Proximal left internal carotid artery plaque without significant stenosis. •  Left internal carotid artery stenosis of 0-49%.     FL Outside Films    Result Date: 10/27/2022  This procedure was auto-finalized with no dictation required.    US Outside Films    Result Date: 10/27/2022  This procedure was auto-finalized with no dictation required.    OUTSIDE NON-INVASIVE CARDIOLOGY STUDY    Result Date: 10/28/2022  This procedure was auto-finalized with no dictation required.    OUTSIDE  INVASIVE CARDIOLOGY STUDY    Result Date: 10/28/2022  This procedure was auto-finalized with no dictation required.    Central Line    Result Date: 11/1/2022  Pavan Lala Jr., MD     11/1/2022  2:20 PM Central Line Patient reassessed immediately prior to procedure Patient location during procedure: OR Start time: 11/1/2022 1:48 PM Stop Time:11/1/2022 1:53 PM Indications: vascular access Staff Anesthesiologist: Pavan Lala Jr., MD Preanesthetic Checklist Completed: patient identified, IV checked, site marked, risks and benefits discussed, surgical consent, monitors and equipment checked, pre-op evaluation and timeout performed Central Line Prep Sterile Tech:cap, gloves, gown, mask and sterile barriers Prep: chloraprep Patient monitoring: blood pressure monitoring, continuous pulse oximetry and EKG Central Line Procedure Laterality:right Location:internal jugular Catheter Type:double lumen, MAC and [place] Catheter Size:9 Fr Guidance:ultrasound guided PROCEDURE NOTE/ULTRASOUND INTERPRETATION.  Using ultrasound guidance the potential vascular sites for insertion of the catheter were visualized to determine the patency of the vessel to be used for vascular access.  After selecting the appropriate site for insertion, the needle was visualized under ultrasound being inserted into the internal jugular vein, followed by ultrasound confirmation of wire and catheter placement. There were no abnormalities seen on ultrasound; an image was taken; and the patient tolerated the procedure with no complications. Images: still images obtained, printed/placed on chart Assessment Post procedure:biopatch applied, line sutured, occlusive dressing applied and secured with tape Assessement:blood return through all ports, free fluid flow and chest x-ray ordered Complications:no Patient Tolerance:patient tolerated the procedure well with no apparent complications Additional Notes Wire confirmed in RIJ prior to dilation      Emergent/Open-Heart Anesthesia GEETA    Result Date: 11/1/2022  Pavan Lala Jr., MD     11/1/2022  1:59 PM Emergent/Open-Heart Anesthesia GEETA Procedure Performed: Emergent/Open-Heart Anesthesia GEETA      Start Time:  11/1/2022 1:55 PM      End Time:        Results for orders placed during the hospital encounter of 10/27/22    Duplex Carotid Ultrasound CAR    Interpretation Summary  •  Proximal right internal carotid artery is normal.  •  Proximal left internal carotid artery plaque without significant stenosis.  •  Left internal carotid artery stenosis of 0-49%.      Results for orders placed during the hospital encounter of 10/27/22    Duplex Carotid Ultrasound CAR    Interpretation Summary  •  Proximal right internal carotid artery is normal.  •  Proximal left internal carotid artery plaque without significant stenosis.  •  Left internal carotid artery stenosis of 0-49%.      Results for orders placed during the hospital encounter of 10/27/22    Adult Transthoracic Echo Complete W/ Cont if Necessary Per Protocol    Interpretation Summary  •  Left ventricular ejection fraction appears to be 56 - 60%.  •  The following left ventricular wall segments are akinetic: apex.  •  Left ventricular diastolic function was normal.  •  Estimated right ventricular systolic pressure from tricuspid regurgitation is normal (<35 mmHg). Calculated right ventricular systolic pressure from tricuspid regurgitation is 33 mmHg.      Plan for Follow-up of Pending Labs/Results:     Discharge Details        Discharge Medications      New Medications      Instructions Start Date   aspirin 81 MG EC tablet   81 mg, Oral, Daily      atorvastatin 40 MG tablet  Commonly known as: LIPITOR   40 mg, Oral, Nightly      clopidogrel 75 MG tablet  Commonly known as: PLAVIX   75 mg, Oral, Daily      empagliflozin 10 MG tablet tablet  Commonly known as: JARDIANCE   10 mg, Oral, Daily   Start Date: November 7, 2022     HYDROcodone-acetaminophen  7.5-325 MG per tablet  Commonly known as: NORCO   1 tablet, Oral, Every 6 Hours PRN      metoprolol tartrate 25 MG tablet  Commonly known as: LOPRESSOR   12.5 mg, Oral, Every 12 Hours Scheduled         Changes to Medications      Instructions Start Date   levothyroxine 175 MCG tablet  Commonly known as: SYNTHROID, LEVOTHROID  What changed:   · medication strength  · how much to take  · when to take this   175 mcg, Oral, Every Early Morning   Start Date: November 7, 2022        Continue These Medications      Instructions Start Date   busPIRone 5 MG tablet  Commonly known as: BUSPAR   5 mg, Oral, 3 Times Daily      insulin glargine 100 UNIT/ML injection  Commonly known as: LANTUS, SEMGLEE   30 Units, Subcutaneous, Daily      metFORMIN 1000 MG tablet  Commonly known as: GLUCOPHAGE   1,000 mg, Oral, 2 Times Daily With Meals      sertraline 100 MG tablet  Commonly known as: ZOLOFT   100 mg, Oral, Daily             Allergies   Allergen Reactions   • Erythromycin Rash         Discharge Disposition:  Home or Self Care    Diet:  Hospital:  Diet Order   Procedures   • Diet Regular; Cardiac, Consistent Carbohydrate       Activity:  - per surgery recommendations    Restrictions or Other Recommendations:  none       CODE STATUS:    Code Status and Medical Interventions:   Ordered at: 11/01/22 1742     Code Status (Patient has no pulse and is not breathing):    CPR (Attempt to Resuscitate)     Medical Interventions (Patient has pulse or is breathing):    Full Support     Release to patient:    Routine Release       No future appointments.              Vandana Hogue DO  11/06/22      Time Spent on Discharge:  I spent  35  minutes on this discharge activity which included: face-to-face encounter with the patient, reviewing the data in the system, coordination of the care with the nursing staff as well as consultants, documentation, and entering orders.

## 2022-11-07 ENCOUNTER — READMISSION MANAGEMENT (OUTPATIENT)
Dept: CALL CENTER | Facility: HOSPITAL | Age: 42
End: 2022-11-07

## 2022-11-07 ENCOUNTER — TELEPHONE (OUTPATIENT)
Dept: CARDIAC SURGERY | Facility: CLINIC | Age: 42
End: 2022-11-07

## 2022-11-07 NOTE — OUTREACH NOTE
Prep Survey    Flowsheet Row Responses   [facility] facility patient discharged from? [facility]   Is LACE score < 7 ? No   Emergency Room discharge w/ pulse ox? No   Eligibility Readm Mgmt   Discharge diagnosis MV CAD   Does the patient have one of the following disease processes/diagnoses(primary or secondary)? Cardiothoracic surgery   Does the patient have Home health ordered? No   Is there a DME ordered? No   Medication alerts for this patient see AVS   Prep survey completed? Yes          ROSY SHAIKH - Registered Nurse

## 2022-11-07 NOTE — TELEPHONE ENCOUNTER
YANET CALLED REGARDING A CALL IN PRESCRIPTION AND THEY NEED SOMEONE TO CALL BACK TO CHANGE THE PHARMACY       11/7/22 9:00am- I spoke with Staci at pharmacy and not sure who called or what message was in regards to.

## 2022-11-09 ENCOUNTER — TELEPHONE (OUTPATIENT)
Dept: CARDIAC SURGERY | Facility: CLINIC | Age: 42
End: 2022-11-09

## 2022-11-09 NOTE — TELEPHONE ENCOUNTER
Her mom called today to report that she picked up and started taking her plavix yesterday.  Last night she experienced some sweats and said her heartbeat was beating extremely hard.  They think it might be related to the plavix she took for the fist time yesterday since being home and this happened last night, would she not have taken plavix in the hospital? Or please advise me of what I should do.   Detail Level: Detailed

## 2022-11-11 ENCOUNTER — READMISSION MANAGEMENT (OUTPATIENT)
Dept: CALL CENTER | Facility: HOSPITAL | Age: 42
End: 2022-11-11

## 2022-11-11 NOTE — OUTREACH NOTE
CT Surgery Week 1 Survey    Flowsheet Row Responses   [facility] facility patient discharged from? [facility]   Does the patient have one of the following disease processes/diagnoses(primary or secondary)? Cardiothoracic surgery   Week 1 attempt successful? No   Unsuccessful attempts Attempt 1            DEJA MARTINS - Registered Nurse

## 2022-11-15 ENCOUNTER — READMISSION MANAGEMENT (OUTPATIENT)
Dept: CALL CENTER | Facility: HOSPITAL | Age: 42
End: 2022-11-15

## 2022-11-15 ENCOUNTER — TELEPHONE (OUTPATIENT)
Dept: CARDIAC SURGERY | Facility: CLINIC | Age: 42
End: 2022-11-15

## 2022-11-15 NOTE — OUTREACH NOTE
CT Surgery Week 1 Survey    Flowsheet Row Responses   [facility] patient discharged from? [place]   Does the patient have one of the following disease processes/diagnoses(primary or secondary)? Cardiothoracic surgery   Week 1 attempt successful? Yes   Call start time 1003   Call end time 1011   Discharge diagnosis MV CAD   Meds reviewed with patient/caregiver? Yes   Is the patient having any side effects they believe may be caused by any medication additions or changes? No   Does the patient have all medications related to this admission filled (includes all antibiotics, pain medications, cardiac medications, etc.) Yes   Is the patient taking all medications as directed (includes completed medication regime)? Yes   Does the patient have a primary care provider?  Yes   Does the patient have an appointment scheduled with their C/T surgeon? Yes   Comments regarding PCP had appt 11-14-22   Has the patient kept scheduled appointments due by today? Yes   Psychosocial issues? No   Comments Chest incision, redness present, has had PCP evaluate & was given oral abx and cream to use on the area of redness.Pain is well controlled pt reports.Denies fever. Appetite decreased, no issues voiding.Glucose 210 at home, trying to alter her diet.   Did the patient receive a copy of their discharge instructions? Yes   Nursing interventions Reviewed instructions with patient   What is the patient's perception of their health status since discharge? Improving   Nursing interventions Nurse provided patient education   Is the patient/caregiver able to teach back normal signs of recovery? Pain or discomfort at incisional site   Nursing interventions Reassured on normal signs of recovery   Is the patient /caregiver able to teach back basic post-op care? Practice cough and deep breath every 4 hours while awake, Continue use of incentive spirometry at least 1 week post discharge, Hold pillow to support chest when coughing, Shower daily,  Use a clean wash cloth and antibacterial bar or liquid soap to clean incisions, Lifting as instructed by MD in discharge instructions   Is the patient/caregiver able to teach back signs and symptoms of incisional infection? Increased redness, swelling or pain at the incisonal site, Increased drainage or bleeding, Fever   Is the patient/caregiver able to teach back steps to recovery at home? Set small, achievable goals for return to baseline health, Eat a well-balance diet   If the patient is a current smoker, are they able to teach back resources for cessation? Not a smoker   Is the patient/caregiver able to teach back the hierarchy of who to call/visit for symptoms/problems? PCP, Specialist, Home health nurse, Urgent Care, ED, 911 Yes   Week 1 call completed? Yes   Is the patient interested in additional calls from an ambulatory ?  NOTE:  applies to high risk patients requiring additional follow-up. Tram SHAIKH - Registered Nurse

## 2022-11-21 ENCOUNTER — READMISSION MANAGEMENT (OUTPATIENT)
Dept: CALL CENTER | Facility: HOSPITAL | Age: 42
End: 2022-11-21

## 2022-11-21 NOTE — OUTREACH NOTE
"CT Surgery Week 2 Survey    Flowsheet Row Responses   [facility] patient discharged from? [place]   Does the patient have one of the following disease processes/diagnoses(primary or secondary)? Cardiothoracic surgery   Week 2 attempt successful? Yes   Call start time 1026   Call end time 1031   Discharge diagnosis MV CAD   Is patient permission given to speak with other caregiver? Yes   List who call center can speak with Merissa calixto    Person spoke with today (if not patient) and relationship Merissa mother    Meds reviewed with patient/caregiver? Yes   Is the patient taking all medications as directed (includes completed medication regime)? Yes   Comments regarding appointments 11/29/22 CTS    Has the patient kept scheduled appointments due by today? Yes  [11/14/22]   Has home health visited the patient within 72 hours of discharge? N/A   Psychosocial issues? No   What is the patient's perception of their health status since discharge? Same  [\"insomnia\" \"nerve sensations\" - Merissa states pt informed PCP about nerve sensations ]   Is the patient/caregiver able to teach back the hierarchy of who to call/visit for symptoms/problems? PCP, Specialist, Home health nurse, Urgent Care, ED, 911 Yes   Week 2 call completed? Yes          DENI H - Registered Nurse  "

## 2022-11-28 ENCOUNTER — TELEPHONE (OUTPATIENT)
Dept: CARDIOLOGY | Facility: CLINIC | Age: 42
End: 2022-11-28

## 2022-11-28 DIAGNOSIS — Z98.890 STATUS POST LEFT HEART CATHETERIZATION (LHC): Primary | ICD-10-CM

## 2022-11-28 NOTE — TELEPHONE ENCOUNTER
Riddhi from [facility] needing an order for the pt to start rehab, ok per MA to place. Faxing order to [phone]

## 2022-11-29 ENCOUNTER — OFFICE VISIT (OUTPATIENT)
Dept: CARDIAC SURGERY | Facility: CLINIC | Age: 42
End: 2022-11-29

## 2022-11-29 VITALS
OXYGEN SATURATION: 95 % | BODY MASS INDEX: 39.44 KG/M2 | HEIGHT: 64 IN | HEART RATE: 84 BPM | TEMPERATURE: 97.8 F | WEIGHT: 231 LBS | SYSTOLIC BLOOD PRESSURE: 111 MMHG | DIASTOLIC BLOOD PRESSURE: 76 MMHG

## 2022-11-29 DIAGNOSIS — Z79.4 TYPE 2 DIABETES MELLITUS WITH HYPERGLYCEMIA, WITH LONG-TERM CURRENT USE OF INSULIN: Chronic | ICD-10-CM

## 2022-11-29 DIAGNOSIS — E11.65 TYPE 2 DIABETES MELLITUS WITH HYPERGLYCEMIA, WITH LONG-TERM CURRENT USE OF INSULIN: Chronic | ICD-10-CM

## 2022-11-29 DIAGNOSIS — I25.10 CORONARY ARTERY DISEASE INVOLVING NATIVE CORONARY ARTERY OF NATIVE HEART WITHOUT ANGINA PECTORIS: Primary | ICD-10-CM

## 2022-11-29 DIAGNOSIS — Z95.1 S/P CABG (CORONARY ARTERY BYPASS GRAFT): ICD-10-CM

## 2022-11-29 PROCEDURE — 99024 POSTOP FOLLOW-UP VISIT: CPT | Performed by: NURSE PRACTITIONER

## 2022-11-29 PROCEDURE — 71046 X-RAY EXAM CHEST 2 VIEWS: CPT | Performed by: NURSE PRACTITIONER

## 2022-11-29 NOTE — PROGRESS NOTES
[facility] Cardiothoracic Surgery Office Follow Up Note     Date of Encounter: 2022     Name: Tawanna Huang  : 1980     Referred By: No ref. provider found  PCP: Bobby Kiser FNP    Chief Complaint:    Chief Complaint   Patient presents with   • Post-op     Hospital follow up s/p CABG 22.       Subjective      History of Present Illness:    Tawanna Huang is a 42 y.o. female  S/p CABG x 1 per Dr. Lane on 22.  PMH: thyroid cancer, diabetes mellitus, tachybrady syndrome status post pacemaker insertion and morbid obesity (BMI 39.34) who presented with chest pain two weeks duration.  She reported significant substernal chest pain at rest.  Cardiac catheterization demonstrates significant ostial LAD stenosis.  Discussed with Dr. Pretty over the phone and he did not think she was a reasonable candidate for PCI and favored CABG.  Inpatient CABG x 1 + TIMA ligation w/ 35 mm Atricure clip 2022. DC home POD #5.  No readmissions.      Patient seen by PCP 2 weeks after DC.  She reported dehiscence of proximal sternal incision.  Rx for Keflex and Bactroban.  Patient states she completed Keflex.  Patient continues daily cleansing with Dial soap and water in the shower.  She states using cotton swabs to apply hydrogen peroxide to the wound edges after her shower.  She then applies thin layer of Bactroban cream to the wound edges internally.  Patient denies fever or chills.  She states blood glucose significantly improved over the past 2 weeks and that all morning sugars have been below 200 this past week.  She brings in a list of home blood pressures which are well controlled 110s to 120s over 60s and 70s.  Daily weight monitoring is stable to 18-2 20.  Patient denies any popping or clicking of the sternum.  She was contacted by [facility] cardiac rehab and went to orientation yesterday with plans to begin therapy later this week.  Additionally, patient describes nightly insomnia  with generalized hypersensitivity in her extremities throughout the day and evening hours.  She does state that she is ambulating 3-4 times per day.  She also states adherence to general sternal precautions and she is not side sleeping. However, she is only wearing her bra 5-6 hours per day.      Review of Systems:  Review of Systems   Constitutional: Negative for chills, decreased appetite, fever and malaise/fatigue.   Cardiovascular: Negative for chest pain, claudication, dyspnea on exertion, irregular heartbeat, leg swelling, near-syncope, orthopnea, palpitations and syncope.   Respiratory: Negative for cough, hemoptysis, shortness of breath, sputum production and wheezing.    Hematologic/Lymphatic: Negative for bleeding problem. Does not bruise/bleed easily.   Skin: Negative for color change, poor wound healing and rash.   Musculoskeletal: Negative for back pain, falls and joint pain.   Gastrointestinal: Negative for abdominal pain, constipation, diarrhea, nausea and vomiting.   Neurological: Negative for focal weakness, numbness and paresthesias.   Psychiatric/Behavioral: Negative for depression. The patient does not have insomnia.        I have reviewed the following portions of the patient's history: allergies, current medications, past family history, past medical history, past social history, past surgical history, problem list and ROS and confirm it's accurate.    Allergies:  Allergies   Allergen Reactions   • Erythromycin Rash       Medications:      Current Outpatient Medications:   •  aspirin 81 MG EC tablet, Take 1 tablet by mouth Daily., Disp: 100 tablet, Rfl: 5  •  atorvastatin (LIPITOR) 40 MG tablet, Take 1 tablet by mouth Every Night., Disp: 90 tablet, Rfl: 0  •  busPIRone (BUSPAR) 5 MG tablet, Take 1 tablet by mouth 3 (Three) Times a Day., Disp: , Rfl:   •  clopidogrel (PLAVIX) 75 MG tablet, Take 1 tablet by mouth Daily., Disp: 30 tablet, Rfl: 5  •  empagliflozin (JARDIANCE) 10 MG tablet tablet,  Take 1 tablet by mouth Daily., Disp: 30 tablet, Rfl: 0  •  insulin glargine (LANTUS, SEMGLEE) 100 UNIT/ML injection, Inject 30 Units under the skin into the appropriate area as directed Daily., Disp: , Rfl:   •  levothyroxine (SYNTHROID, LEVOTHROID) 175 MCG tablet, Take 1 tablet by mouth Every Morning., Disp: 30 tablet, Rfl: 0  •  metFORMIN (GLUCOPHAGE) 1000 MG tablet, Take 1 tablet by mouth 2 (Two) Times a Day With Meals., Disp: , Rfl:   •  metoprolol tartrate (LOPRESSOR) 25 MG tablet, Take 0.5 tablets by mouth Every 12 (Twelve) Hours., Disp: 60 tablet, Rfl: 0  •  sertraline (ZOLOFT) 100 MG tablet, Take 1 tablet by mouth Daily., Disp: 30 tablet, Rfl: 5    History:   Past Medical History:   Diagnosis Date   • Enlarged liver    • Irregular heart beat    • Pacemaker    • Thyroid cancer (HCC) 2012    papillary s/p thyroidectomy and radioactive iodine   • Type 2 diabetes mellitus (HCC)        Past Surgical History:   Procedure Laterality Date   • CORONARY ARTERY BYPASS GRAFT N/A 11/1/2022    Procedure: MEDIAN STERNOTOMY, CORONARY ARTERY BYPASS UTILIZING THE LEFT INTERNAL MAMMARY ARTERY GRAFT, LEFT ATRIAL APPENDAGE LIGATION, GEETA;  Surgeon: Bahman Lane MD;  Location: [facility];  Service: Cardiothoracic;  Laterality: N/A;   • PACEMAKER IMPLANTATION     • THYROIDECTOMY  05/2012       Social History     Socioeconomic History   • Marital status:    • Number of children: 0   Tobacco Use   • Smoking status: Never   • Smokeless tobacco: Never   Vaping Use   • Vaping Use: Never used   Substance and Sexual Activity   • Alcohol use: No   • Drug use: No   • Sexual activity: Yes     Birth control/protection: None        Family History   Problem Relation Age of Onset   • No Known Problems Mother    • Seizures Father    • Cervical cancer Paternal Grandmother    • Heart disease Paternal Grandfather    • Heart attack Paternal Grandfather    • Diabetes Paternal Grandfather        Objective   Physical Exam:  Vitals:    11/29/22  "0920   BP: 111/76   BP Location: Right arm   Patient Position: Sitting   Pulse: 84   Temp: 97.8 °F (36.6 °C)   SpO2: 95%   Weight: 105 kg (231 lb)   Height: 162.6 cm (64\")      Body mass index is 39.65 kg/m².    Physical Exam  Constitutional:       General: She is not in acute distress.     Appearance: She is obese. She is not ill-appearing or toxic-appearing.   HENT:      Head: Normocephalic and atraumatic.   Eyes:      Conjunctiva/sclera: Conjunctivae normal.      Pupils: Pupils are equal, round, and reactive to light.   Neck:      Vascular: No carotid bruit.   Cardiovascular:      Rate and Rhythm: Normal rate and regular rhythm.      Pulses: Normal pulses.      Heart sounds: Normal heart sounds. No murmur heard.    No friction rub. No gallop.      Comments: PPM palpable left upper chest wall  Pulmonary:      Effort: Pulmonary effort is normal. No respiratory distress.      Breath sounds: No wheezing, rhonchi or rales.      Comments: Sternum stable to palpation.  Large pendulous breasts. Proximal dehiscence sternotomy incision approximately 2.5 inches with fibrinous exudate @ base of wound bed.  No peripheral induration or tenderness. Moist distal wound base.  No odor.    Chest:      Chest wall: No tenderness.   Abdominal:      General: Bowel sounds are normal.      Palpations: Abdomen is soft.   Musculoskeletal:         General: Normal range of motion.      Cervical back: Normal range of motion and neck supple.      Right lower leg: No edema.      Left lower leg: No edema.   Lymphadenopathy:      Cervical: No cervical adenopathy.   Skin:     General: Skin is warm and dry.      Capillary Refill: Capillary refill takes less than 2 seconds.      Comments: MT site sutures removed without complication   Neurological:      General: No focal deficit present.      Mental Status: She is alert and oriented to person, place, and time.   Psychiatric:         Mood and Affect: Mood normal.         Behavior: Behavior normal. "             Imaging/Labs:  XR Chest 2 View    Result Date: 11/29/2022 personally reviewed:  Stable mild cardiac enlargement. No acute chest finding.  This report was finalized on 11/29/2022 9:36 AM by Yaneth Garcia MD.      XR Chest 1 View    Result Date: 11/5/2022  IMPRESSION : Low lung volume portable chest with minimal dependent atelectasis[  This report was finalized on 11/5/2022 9:34 AM by Tank Howe.       Results for orders placed during the hospital encounter of 10/27/22    Duplex Carotid Ultrasound CAR    Interpretation Summary  •  Proximal right internal carotid artery is normal.  •  Proximal left internal carotid artery plaque without significant stenosis.  •  Left internal carotid artery stenosis of 0-49%.      Assessment / Plan      Assessment / Plan:  1. Coronary artery disease s/p CABG 11/1/22 per Dr. Lane  - CABG x 1 + TIMA ligation w/ 35 mm Atricure clip 11/1/2022.  - No post operative complications  - DC home POD #5.  No readmissions.    -Seen by PCP 2 weeks post discharge.  Proximal sternotomy incision dehiscence treated with Keflex and Bactroban cream  -Patient presents to clinic today with progression of dehiscence distally.  Although no signs of active infection  -Recommend twice daily packing with sterile gauze and saline.  Cover with light adhesive dressing  -May continue to use topical hydrogen peroxide but discontinue Bactroban cream  -Follow-up in 2 weeks  -Clinic chest x-ray today without evidence of cardiomegaly or pleural effusion  -Recommend wait on cardiac rehab but increase ambulation activities and continue incentive spirometer  -Promptly report any fever or changes to the wound.       2. T2DM on insulin and metformin   -Continue daily blood glucose monitoring  -Follow-up with primary care provider for med management         Patient Education:  Activity Restrictions: You may resume driving at 6 weeks post-operatively. No lifting more than 10 lbs for 12 weeks (3 months). At  this time you can slowly increase your weight as tolerated. You should not partake in any overhead activities or movements that involve twisting of your upper chest and torso such as (but not limited to) golfing or tennis, lawn mowing including zero turn mowers, use of lawn trimmers or edgers, shoveling, painting, vacuuming, mopping, sweeping, etc.     Wound Care: continue to keep your incisions clean and dry. You may use plain antibacterial soap (i.e. dial) and water to clean and pat dry. No lotions, creams, oils, powders, salves, or ointments. Please watch for signs and symptoms of infection which can include but are not limited to: increased pain or tenderness around your incision, warmth to the site or fever, redness or red streaking, and foul smelling or appearing drainage. Clear drainage and bloody drainage are not worrisome. If your wound opens up please contact our office.    Follow Up:   Return in about 2 weeks (around 12/13/2022) for Wound Check & return to work approval.   Or sooner for any further concerns or worsening sign and symptoms. If unable to reach us in the office please dial 911 or go to the nearest emergency department.      HEDY Norris  [facility] Cardiothoracic Surgery    Time Spent: I spent 35 minutes caring for Tawanna on this date of service. This time includes time spent by me in the following activities: preparing for the visit, reviewing tests, obtaining and/or reviewing a separately obtained history, performing a medically appropriate examination and/or evaluation, counseling and educating the patient/family/caregiver and referring and communicating with other health care professionals.    Answers for HPI/ROS submitted by the patient on 11/29/2022  Please describe your symptoms.: Follow up from cabg surgery a month ago  Have you had these symptoms before?: No  How long have you been having these symptoms?: Greater than 2 weeks  What is the primary reason for your visit?:  Other

## 2022-11-30 ENCOUNTER — READMISSION MANAGEMENT (OUTPATIENT)
Dept: CALL CENTER | Facility: HOSPITAL | Age: 42
End: 2022-11-30

## 2022-11-30 NOTE — OUTREACH NOTE
CT Surgery Week 3 Survey    Flowsheet Row Responses   [facility] facility patient discharged from? [place]   Does the patient have one of the following disease processes/diagnoses(primary or secondary)? Cardiothoracic surgery   Week 3 attempt successful? No   Unsuccessful attempts Attempt 1          SHERICE RIDLEY - Registered Nurse

## 2022-12-09 ENCOUNTER — TELEPHONE (OUTPATIENT)
Dept: CARDIAC SURGERY | Facility: CLINIC | Age: 42
End: 2022-12-09

## 2022-12-13 ENCOUNTER — OFFICE VISIT (OUTPATIENT)
Dept: CARDIAC SURGERY | Facility: CLINIC | Age: 42
End: 2022-12-13

## 2022-12-13 VITALS
WEIGHT: 230.6 LBS | SYSTOLIC BLOOD PRESSURE: 110 MMHG | DIASTOLIC BLOOD PRESSURE: 70 MMHG | OXYGEN SATURATION: 95 % | BODY MASS INDEX: 39.37 KG/M2 | HEART RATE: 91 BPM | HEIGHT: 64 IN | TEMPERATURE: 97.1 F

## 2022-12-13 DIAGNOSIS — I25.10 CORONARY ARTERY DISEASE INVOLVING NATIVE CORONARY ARTERY OF NATIVE HEART WITHOUT ANGINA PECTORIS: Primary | ICD-10-CM

## 2022-12-13 PROCEDURE — 99024 POSTOP FOLLOW-UP VISIT: CPT | Performed by: NURSE PRACTITIONER

## 2022-12-13 NOTE — PROGRESS NOTES
[facility] Cardiothoracic Surgery Office Follow Up Note     Date of Encounter: 2022     Name: Tawanna Huang  : 1980     Referred By: No ref. provider found  PCP: Bobby Kiser FNP    Chief Complaint:    Chief Complaint   Patient presents with   • Wound Check     Patient presents for wound check - sternotomy incision - s/p CABG X 1 22.  She states she is much improved       Subjective      History of Present Illness:   Tawanna Huang is a 42 y.o. female S/p CABG x 1 per Dr. Lane on 22.  Here for sternotomy wound check.  PMH: thyroid cancer, diabetes mellitus, tachybrady syndrome status post pacemaker insertion and morbid obesity (BMI 39.34). Last OV, 22 central and distal sternotomy incision dehiscence.  Patient states compliance w/ twice daily packing with sterile gauze and saline, cover with light adhesive dressing,  topical hydrogen peroxide. She has been wearing cotton bra except when showering.  Overall, incision is improving.  She states no pain, no drainage, no fever/chills.        Review of Systems:  Review of Systems   Constitutional: Negative. Negative for chills, decreased appetite, diaphoresis, fever, malaise/fatigue, night sweats, weight gain and weight loss.   HENT: Positive for congestion. Negative for ear pain, hoarse voice and sore throat.    Eyes: Negative.  Negative for blurred vision, double vision and visual disturbance.   Cardiovascular: Negative.  Negative for chest pain, claudication, dyspnea on exertion, irregular heartbeat, leg swelling, near-syncope, orthopnea, palpitations, paroxysmal nocturnal dyspnea and syncope.   Respiratory: Positive for cough and wheezing. Negative for hemoptysis, shortness of breath and sputum production.    Endocrine: Negative.    Hematologic/Lymphatic: Negative for adenopathy and bleeding problem. Bruises/bleeds easily.   Skin: Negative.  Negative for color change, nail changes, poor wound healing and rash.    Musculoskeletal: Negative.  Negative for back pain, falls, muscle cramps and myalgias.   Gastrointestinal: Negative.  Negative for abdominal pain, dysphagia and heartburn.   Genitourinary: Negative.  Negative for flank pain.   Neurological: Negative.  Negative for brief paralysis, disturbances in coordination, dizziness, focal weakness, headaches, light-headedness, loss of balance, numbness, paresthesias, sensory change, vertigo and weakness.   Psychiatric/Behavioral: Negative for depression and suicidal ideas. The patient has insomnia.    Allergic/Immunologic: Negative for persistent infections.       I have reviewed the following portions of the patient's history: allergies, current medications, past medical history, past social history, past surgical history, problem list and ROS and confirm it's accurate.    Allergies:  Allergies   Allergen Reactions   • Erythromycin Rash       Medications:      Current Outpatient Medications:   •  aspirin 81 MG EC tablet, Take 1 tablet by mouth Daily., Disp: 100 tablet, Rfl: 5  •  atorvastatin (LIPITOR) 40 MG tablet, Take 1 tablet by mouth Every Night., Disp: 90 tablet, Rfl: 0  •  busPIRone (BUSPAR) 5 MG tablet, Take 1 tablet by mouth 3 (Three) Times a Day., Disp: , Rfl:   •  clopidogrel (PLAVIX) 75 MG tablet, Take 1 tablet by mouth Daily., Disp: 30 tablet, Rfl: 5  •  empagliflozin (JARDIANCE) 10 MG tablet tablet, Take 1 tablet by mouth Daily., Disp: 30 tablet, Rfl: 0  •  insulin glargine (LANTUS, SEMGLEE) 100 UNIT/ML injection, Inject 30 Units under the skin into the appropriate area as directed Daily., Disp: , Rfl:   •  levothyroxine (SYNTHROID, LEVOTHROID) 175 MCG tablet, Take 1 tablet by mouth Every Morning., Disp: 30 tablet, Rfl: 0  •  metFORMIN (GLUCOPHAGE) 1000 MG tablet, Take 1 tablet by mouth 2 (Two) Times a Day With Meals., Disp: , Rfl:   •  metoprolol tartrate (LOPRESSOR) 25 MG tablet, Take 0.5 tablets by mouth Every 12 (Twelve) Hours., Disp: 60 tablet, Rfl: 0  •   "sertraline (ZOLOFT) 100 MG tablet, Take 1 tablet by mouth Daily., Disp: 30 tablet, Rfl: 5    History:   Past Medical History:   Diagnosis Date   • Enlarged liver    • Irregular heart beat    • Pacemaker    • Thyroid cancer (HCC) 2012    papillary s/p thyroidectomy and radioactive iodine   • Type 2 diabetes mellitus (HCC)        Past Surgical History:   Procedure Laterality Date   • CORONARY ARTERY BYPASS GRAFT N/A 11/1/2022    Procedure: MEDIAN STERNOTOMY, CORONARY ARTERY BYPASS UTILIZING THE LEFT INTERNAL MAMMARY ARTERY GRAFT, LEFT ATRIAL APPENDAGE LIGATION, GEETA;  Surgeon: Bahman Lane MD;  Location: [facility];  Service: Cardiothoracic;  Laterality: N/A;   • PACEMAKER IMPLANTATION     • THYROIDECTOMY  05/2012       Social History     Socioeconomic History   • Marital status:    • Number of children: 0   Tobacco Use   • Smoking status: Never   • Smokeless tobacco: Never   Vaping Use   • Vaping Use: Never used   Substance and Sexual Activity   • Alcohol use: No   • Drug use: No   • Sexual activity: Yes     Birth control/protection: None        Family History   Problem Relation Age of Onset   • No Known Problems Mother    • Seizures Father    • Cervical cancer Paternal Grandmother    • Heart disease Paternal Grandfather    • Heart attack Paternal Grandfather    • Diabetes Paternal Grandfather        Objective     Physical Exam:  Vitals:    12/13/22 1443 12/13/22 1444   BP: 110/72 110/70   BP Location: Right arm Left arm   Patient Position: Sitting Sitting   Pulse: 91    Temp: 97.1 °F (36.2 °C)    SpO2: 95%    Weight: 105 kg (230 lb 9.6 oz)    Height: 162.6 cm (64.02\")       Body mass index is 39.56 kg/m².    Physical Exam  Vitals reviewed.   Constitutional:       General: She is not in acute distress.     Appearance: She is obese. She is not toxic-appearing.   Eyes:      Conjunctiva/sclera: Conjunctivae normal.      Pupils: Pupils are equal, round, and reactive to light.   Cardiovascular:      Rate and " Rhythm: Normal rate and regular rhythm.   Pulmonary:      Effort: Pulmonary effort is normal.      Breath sounds: Normal breath sounds.   Musculoskeletal:         General: Normal range of motion.      Right lower leg: No edema.      Left lower leg: No edema.   Skin:     General: Skin is warm and dry.      Capillary Refill: Capillary refill takes less than 2 seconds.   Neurological:      General: No focal deficit present.      Mental Status: She is alert and oriented to person, place, and time.   Psychiatric:         Mood and Affect: Mood normal.         Behavior: Behavior normal.             Imaging/Labs:  XR Chest 2 View    Result Date: 11/29/2022  Stable mild cardiac enlargement. No acute chest finding.  This report was finalized on 11/29/2022 9:36 AM by Yaneth Garcia MD.         Results for orders placed during the hospital encounter of 10/27/22    Duplex Carotid Ultrasound CAR    Interpretation Summary  •  Proximal right internal carotid artery is normal.  •  Proximal left internal carotid artery plaque without significant stenosis.  •  Left internal carotid artery stenosis of 0-49%.      Assessment / Plan      Assessment / Plan:  Diagnoses and all orders for this visit:    1. Coronary artery disease involving native coronary artery of native heart without angina pectoris (Primary)    - CABG x 1 + TIMA ligation w/ 35 mm Atricure clip 11/1/2022.  - No post operative complications  - DC home POD #5.  No readmissions.    -Seen by PCP 2 weeks post discharge.  Proximal sternotomy incision dehiscence treated with Keflex and Bactroban cream  -Patient presented to clinic 11/29/22 with progression of dehiscence distally.  Although no signs of active infection  -Recommend twice daily packing with sterile gauze and saline.  Cover with light adhesive dressing  -May continue to use topical hydrogen peroxide but discontinue Bactroban cream  -Follow-up today w/ evidence of improving wound closure  - Continue current wound care  regimen  - RTC in 2-3 weeks to confirm complete wound closure  -Promptly report any fever or changes to the wound.        Patient Education: Wound Care: continue to keep your incisions clean and dry. You may use plain antibacterial soap (i.e. dial) and water to clean and pat dry. Use H2O2 one cap full daily.  Lightly pack saline soaked gauze in central dehiscence BID.  No lotions, creams, oils, powders, salves, or ointments. Please watch for signs and symptoms of infection which can include but are not limited to: increased pain or tenderness around your incision, warmth to the site or fever, redness or red streaking, and foul smelling or appearing drainage. Clear drainage and bloody drainage are not worrisome. If your wound opens up please contact our office.      Follow Up:   Return in about 3 weeks (around 1/3/2023) for Wound Check.   Or sooner for any further concerns or worsening sign and symptoms. If unable to reach us in the office please dial 911 or go to the nearest emergency department.      HEDY Norris  [facility] Cardiothoracic Surgery    Time Spent: I spent 18 minutes caring for Tawanna on this date of service. This time includes time spent by me in the following activities: preparing for the visit, obtaining and/or reviewing a separately obtained history, performing a medically appropriate examination and/or evaluation, counseling and educating the patient/family/caregiver, documenting information in the medical record and care coordination.    Answers for HPI/ROS submitted by the patient on 12/13/2022  What is the primary reason for your visit?: Cough  ear congestion: No  nasal congestion: Yes  postnasal drip: Yes  rhinorrhea: Yes  sweats: No  Aggravated by: lying down

## 2022-12-28 ENCOUNTER — TELEPHONE (OUTPATIENT)
Dept: CARDIAC SURGERY | Facility: CLINIC | Age: 42
End: 2022-12-28

## 2022-12-28 NOTE — TELEPHONE ENCOUNTER
Pt called back to say that she was passing blood clots vaginally and in an excessive amount. Mary Kate David was made aware of the pt's problems she advised her to follow with her PCP and make an appt with her Gyno as ASA and Plavix together would not cause these problems. Pt V/U and knows to call us back if she needs us.

## 2022-12-28 NOTE — TELEPHONE ENCOUNTER
Caller: Merissa Ramos    Relationship: Mother    Best call back number: [phone]    What is the best time to reach you: ANY    Who are you requesting to speak with (clinical staff, provider,  specific staff member): ANY    Do you know the name of the person who called: PT MOTHER    What was the call regarding:   PT WAS PRESCRIBED MEDICATION BY DR. MILAN TWO MONTHS AGO FOR BLOOD THINNER MEDICATION. PT HAS BEEN HAVING TROUBLE WITH NOSE BLEEDS AND BLOOD IS PASSING. PT MOTHER FEELS THAT THE PT SHOULD STOP THE MEDICATION. (BLOOD THINNER MEDICATION).     Do you require a callback: YES

## 2023-01-03 ENCOUNTER — TELEPHONE (OUTPATIENT)
Dept: CARDIAC SURGERY | Facility: CLINIC | Age: 43
End: 2023-01-03

## 2023-01-03 NOTE — TELEPHONE ENCOUNTER
Caller: Tawanna Huang    Relationship to patient: Self    Chief complaint: PATIENT COULD NOT MAKE  IT TO APPOINTMENT AT 3:00    Type of visit: POST OP     If rescheduling, when is the original appointment: 1/3/23    Additional notes: SCHEDULED PATIENT ON NEXT AVAILABLE APPOINTMENT ON 1/10/23 @ 8:30

## 2023-01-10 ENCOUNTER — OFFICE VISIT (OUTPATIENT)
Dept: CARDIAC SURGERY | Facility: CLINIC | Age: 43
End: 2023-01-10
Payer: COMMERCIAL

## 2023-01-10 VITALS
WEIGHT: 226 LBS | OXYGEN SATURATION: 97 % | TEMPERATURE: 98.8 F | HEART RATE: 79 BPM | HEIGHT: 64 IN | SYSTOLIC BLOOD PRESSURE: 128 MMHG | BODY MASS INDEX: 38.58 KG/M2 | DIASTOLIC BLOOD PRESSURE: 74 MMHG

## 2023-01-10 DIAGNOSIS — E11.65 TYPE 2 DIABETES MELLITUS WITH HYPERGLYCEMIA, WITH LONG-TERM CURRENT USE OF INSULIN: Chronic | ICD-10-CM

## 2023-01-10 DIAGNOSIS — Z95.0 PACEMAKER: Chronic | ICD-10-CM

## 2023-01-10 DIAGNOSIS — I25.10 CORONARY ARTERY DISEASE INVOLVING NATIVE CORONARY ARTERY OF NATIVE HEART WITHOUT ANGINA PECTORIS: Primary | ICD-10-CM

## 2023-01-10 DIAGNOSIS — Z79.4 TYPE 2 DIABETES MELLITUS WITH HYPERGLYCEMIA, WITH LONG-TERM CURRENT USE OF INSULIN: Chronic | ICD-10-CM

## 2023-01-10 PROCEDURE — 99024 POSTOP FOLLOW-UP VISIT: CPT | Performed by: NURSE PRACTITIONER

## 2023-01-10 NOTE — PROGRESS NOTES
[facility] Cardiothoracic Surgery Office Follow Up Note     Date of Encounter: 01/10/2023     Name: Tawanna Huang  : 1980     Referred By: No ref. provider found  PCP: Bobby Kiser FNP    Chief Complaint:    Chief Complaint   Patient presents with   • Wound Check     3 week follow up for a wound check, CABG LAAL 2022- [facility]- CAD- Pt states that her incision has healed and she is having no problems. Denies SOB, fatigue and chest pains.        Subjective      History of Present Illness:    Tawanna Huang is a 42 y.o. female here for 3-week follow-up for sternal wound healing.  S/p CABG with TIMA L per Dr. Lane 2022.  PMH: thyroid cancer, diabetes mellitus, tachybrady syndrome status post pacemaker insertion and morbid obesity (BMI 39.34).     At early postop visit 2022 patient reported being seen for sternal wound dehiscence by primary care provider who initiated Keflex and Bactroban.  Patient was instructed to discontinue the Bactroban cream but to change the wound care to twice daily dressing changes including sterile gauze for packing and damp saline covering + dry dressing.   Patient followed up 2022 with near resolution of the central dehiscence.  She continued daily dressing changes and follows up today.  She is very happy to report that the wound edges have been completely healed for the past 10 days.  Patient is back to work.  She also wishes to return to cardiac rehab.  Patient was seen by primary care within the past 2 weeks for diabetes management.    Review of Systems:  Review of Systems   Constitutional: Negative for chills, decreased appetite, diaphoresis, fever, malaise/fatigue, night sweats and weight loss.   HENT: Positive for congestion (sinus/ allergies). Negative for hoarse voice, sore throat and stridor.    Cardiovascular: Negative for chest pain, claudication, dyspnea on exertion, irregular heartbeat, leg swelling, near-syncope, orthopnea,  palpitations, paroxysmal nocturnal dyspnea and syncope.   Respiratory: Negative for cough, hemoptysis, shortness of breath, sleep disturbances due to breathing, snoring, sputum production and wheezing.    Hematologic/Lymphatic: Negative for adenopathy and bleeding problem. Does not bruise/bleed easily.   Skin: Negative for color change, dry skin, itching, poor wound healing and rash.   Musculoskeletal: Negative for arthritis, back pain, falls and muscle weakness.   Gastrointestinal: Negative for abdominal pain, anorexia, constipation, diarrhea, hematochezia, melena, nausea and vomiting.   Neurological: Negative for difficulty with concentration, disturbances in coordination, dizziness, loss of balance, numbness, seizures, vertigo and weakness.   Psychiatric/Behavioral: Negative for altered mental status, depression, memory loss and substance abuse. The patient does not have insomnia and is not nervous/anxious.    Allergic/Immunologic: Positive for environmental allergies. Negative for persistent infections.       I have reviewed the following portions of the patient's history: allergies, current medications, past medical history, past social history, past surgical history, problem list and ROS and confirm it's accurate.    Allergies:  Allergies   Allergen Reactions   • Erythromycin Rash       Medications:      Current Outpatient Medications:   •  aspirin 81 MG EC tablet, Take 1 tablet by mouth Daily., Disp: 100 tablet, Rfl: 5  •  atorvastatin (LIPITOR) 40 MG tablet, Take 1 tablet by mouth Every Night., Disp: 90 tablet, Rfl: 0  •  busPIRone (BUSPAR) 5 MG tablet, Take 1 tablet by mouth 3 (Three) Times a Day., Disp: , Rfl:   •  clopidogrel (PLAVIX) 75 MG tablet, Take 1 tablet by mouth Daily., Disp: 30 tablet, Rfl: 5  •  empagliflozin (JARDIANCE) 10 MG tablet tablet, Take 1 tablet by mouth Daily., Disp: 30 tablet, Rfl: 0  •  insulin glargine (LANTUS, SEMGLEE) 100 UNIT/ML injection, Inject 30 Units under the skin into  "the appropriate area as directed Daily., Disp: , Rfl:   •  levothyroxine (SYNTHROID, LEVOTHROID) 175 MCG tablet, Take 1 tablet by mouth Every Morning., Disp: 30 tablet, Rfl: 0  •  metFORMIN (GLUCOPHAGE) 1000 MG tablet, Take 1 tablet by mouth 2 (Two) Times a Day With Meals., Disp: , Rfl:   •  metoprolol tartrate (LOPRESSOR) 25 MG tablet, Take 0.5 tablets by mouth Every 12 (Twelve) Hours., Disp: 60 tablet, Rfl: 0  •  sertraline (ZOLOFT) 100 MG tablet, Take 1 tablet by mouth Daily., Disp: 30 tablet, Rfl: 5    History:   Past Medical History:   Diagnosis Date   • Enlarged liver    • Irregular heart beat    • Pacemaker    • Thyroid cancer (HCC) 2012    papillary s/p thyroidectomy and radioactive iodine   • Type 2 diabetes mellitus (HCC)        Past Surgical History:   Procedure Laterality Date   • CORONARY ARTERY BYPASS GRAFT N/A 11/1/2022    Procedure: MEDIAN STERNOTOMY, CORONARY ARTERY BYPASS UTILIZING THE LEFT INTERNAL MAMMARY ARTERY GRAFT, LEFT ATRIAL APPENDAGE LIGATION, GEETA;  Surgeon: Bahman Lane MD;  Location: [facility];  Service: Cardiothoracic;  Laterality: N/A;   • PACEMAKER IMPLANTATION     • THYROIDECTOMY  05/2012       Social History     Socioeconomic History   • Marital status:    • Number of children: 0   Tobacco Use   • Smoking status: Never   • Smokeless tobacco: Never   Vaping Use   • Vaping Use: Never used   Substance and Sexual Activity   • Alcohol use: No   • Drug use: No   • Sexual activity: Yes     Birth control/protection: None        Family History   Problem Relation Age of Onset   • No Known Problems Mother    • Seizures Father    • Cervical cancer Paternal Grandmother    • Heart disease Paternal Grandfather    • Heart attack Paternal Grandfather    • Diabetes Paternal Grandfather        Objective     Physical Exam:  Vitals:    01/10/23 1039   BP: 128/74   Pulse: 79   Temp: 98.8 °F (37.1 °C)   SpO2: 97%   Weight: 103 kg (226 lb)   Height: 162.6 cm (64\")      Body mass index is 38.79 " kg/m².    Physical Exam        Imaging/Labs:    Results for orders placed during the hospital encounter of 10/27/22    Duplex Carotid Ultrasound CAR    Interpretation Summary  •  Proximal right internal carotid artery is normal.  •  Proximal left internal carotid artery plaque without significant stenosis.  •  Left internal carotid artery stenosis of 0-49%.      Assessment / Plan      Assessment / Plan:  1. Coronary artery disease s/p CABG   - CABG x 1 + TIMA ligation w/ 35 mm Atricure clip 11/1/2022 per Dr. Lane  - No post operative complications  - DC home POD #5.  No readmissions.    -Seen by PCP 2 weeks post discharge.  Proximal sternotomy incision dehiscence treated with Keflex and Bactroban cream  -Patient presented to clinic 11/29/22 with progression of dehiscence distally.  Although no signs of active infection  -Recommended twice daily packing with sterile gauze and saline.  Cover with light adhesive dressing. Continue to use topical hydrogen peroxide but discontinue Bactroban cream  - Seen in clinic 12/23/2022 with evidence of improving wound closure  - Follow-up today w/ evidence of complete wound closure   -No further dressings required.  May resume cardiac rehab  -Continue to follow post CABG sternal precautions as outlined previously  -Now that wound closure is complete may follow-up with CT surgery clinic as needed  -Patient has already followed up with cardiology at [facility]    2. T2DM on insulin and metformin   -Following closely with primary care    3. Tachy-gilma syndrome s/p PPM   -Med management and pacemaker checks per [facility] cardiology [place] [facility]         Patient Education:  • No lifting over 10 lbs for 12 weeks (3 months).  After this time you can slowly increase your weight as tolerated.  • You should not partake in any overhead activities or movements that involve twisting or jarring of your upper chest and torso such as (but not limited to) -- golfing, tennis, lawn mowing including zero  turn mowers, use of lawn trimmers or edgers, shoveling, painting, vacuuming, mopping, sweeping, shooting a gun, etc.  • Continue to keep your incisions clean and dry.  Use plain antibacterial soap (i.e. dial) and water to clean and pat dry.    • Do no apply any lotions, creams, oils, powders, salves, or ointments directly to incisional sites.  • Please watch for signs and symptoms of infection which may include but are not limited to: increased pain or tenderness around your incision, warmth at the site or fever, redness or red streaking, and foul smelling or appearing drainage.  Clear drainage and bloody drainage are not worrisome.  • If your incision opens up please contact our office.        Follow Up:   [facility].   Or sooner for any further concerns or worsening sign and symptoms. If unable to reach us in the office please dial 911 or go to the nearest emergency department.      HEDY Norris  [facility] Cardiothoracic Surgery    Time Spent: I spent 22 minutes caring for Tawanna on this date of service. This time includes time spent by me in the following activities: preparing for the visit, obtaining and/or reviewing a separately obtained history, performing a medically appropriate examination and/or evaluation, counseling and educating the patient/family/caregiver, documenting information in the medical record and care coordination.

## (undated) DEVICE — ANTIBACTERIAL UNDYED BRAIDED (POLYGLACTIN 910), SYNTHETIC ABSORBABLE SUTURE: Brand: COATED VICRYL

## (undated) DEVICE — SHROD PENCL MEGADYNE ATTACHAVAC W/CONN/22MM

## (undated) DEVICE — SUT ETHIB 1 CT1 30IN  X425H

## (undated) DEVICE — GUIDE SELECT ATRICLIP

## (undated) DEVICE — CANN AORT ROOT DLP VNT 14G 7F

## (undated) DEVICE — ADHS SKIN PREMIERPRO EXOFIN TOPICAL HI/VISC .5ML

## (undated) DEVICE — CATHETER,FOLEY,100%SILICONE,18FR,10ML,LF: Brand: MEDLINE

## (undated) DEVICE — PK HEART OPN 10

## (undated) DEVICE — TB SXN SURG DLP MALL/CARD SFT/TP 6F 6IN

## (undated) DEVICE — BLD SCLPL BEAVR MINI STR 2BVL 180D LF

## (undated) DEVICE — SUCTION CANISTER 2500CC: Brand: DEROYAL

## (undated) DEVICE — PENCL ROCKRSWCH MEGADYNE W/HOLSTR 10FT SS

## (undated) DEVICE — SUT PROLN 6/0 C1 D/A 30IN 8706H

## (undated) DEVICE — SYR LUERLOK 30CC

## (undated) DEVICE — SUT PROLN CARDIO V5 3/0 36IN 8936H

## (undated) DEVICE — Device

## (undated) DEVICE — CVR PROB ULTRASND/TRANSD W/GEL 18X120CM STRL

## (undated) DEVICE — SUT PROLN 4/0 V7 36IN 8975H

## (undated) DEVICE — SUT SILK 0/0 CT2 18IN C027D

## (undated) DEVICE — EZ GLIDE AORTIC CANNULA: Brand: EDWARDS LIFESCIENCES EZ GLIDE AORTIC CANNULA

## (undated) DEVICE — 3M™ MEDIPORE™ H SOFT CLOTH SURGICAL TAPE, 2863, 3 IN X 10 YD, 12/CASE: Brand: 3M™ MEDIPORE™

## (undated) DEVICE — SYS VASOVIEW HEMOPRO ENDOSCOPIC HARVST VESL

## (undated) DEVICE — TBG PENCL TELESCP MEGADYNE SMOKE EVAC 10FT

## (undated) DEVICE — SUT PROLN 7/0 BV1 D/A 24IN 8702H

## (undated) DEVICE — 12 FOOT DISPOSABLE EXTENSION CABLE WITH SAFE CONNECT / SCREW-DOWN

## (undated) DEVICE — PAD ARMBRD SURG CONVOL 7.5X20X2IN

## (undated) DEVICE — TRAP FLD MINIVAC MEGADYNE 100ML

## (undated) DEVICE — AVID DUAL STAGE VENOUS DRAINAGE CANNULA: Brand: AVID DUAL STAGE VENOUS DRAINAGE CANNULA

## (undated) DEVICE — PK PERFUS CUST W/CARDIOPLEGIA

## (undated) DEVICE — SUT PROLN SURGILENE SURGIPRO 8 0 24IN BL

## (undated) DEVICE — ADAPT ANTEGRADE RETRGR

## (undated) DEVICE — PATIENT RETURN ELECTRODE, SINGLE-USE, CONTACT QUALITY MONITORING, ADULT, WITH 9FT CORD, FOR PATIENTS WEIGING OVER 33LBS. (15KG): Brand: MEGADYNE

## (undated) DEVICE — PK ATS CUST W CARDIOTOMY RESEVOIR

## (undated) DEVICE — GLV SURG BIOGELULTRATOUCH POLYISPRN PF LF SZ7 STRL

## (undated) DEVICE — SUT SILK 4/0 TIES 18IN A183H

## (undated) DEVICE — ABDOMINAL BINDER: Brand: DEROYAL

## (undated) DEVICE — TBG SXN INTRACARD RIDGID FLUT 24F .25X13IN A/

## (undated) DEVICE — OASIS DRAIN, SINGLE, INLINE & ATS COMPATIBLE: Brand: OASIS

## (undated) DEVICE — LEVEL SENSORS PADS ARE USED TO ATTACH THE LEVEL SENSORS TO A HARD SHELL RESERVOIR. INCLUDES COUPLING GEL.: Brand: TERUMO® ADVANCED PERFUSION SYSTEM 1

## (undated) DEVICE — SENSR CERBRL O2 PK/2

## (undated) DEVICE — CLTH CLENS READYCLEANSE PERI CARE PK/5

## (undated) DEVICE — FLTR RESERV PERFUS INTERSEPT 02 STRL

## (undated) DEVICE — SUT SILK 2/0 CT1 CR8 18IN C022D

## (undated) DEVICE — SUT SILK 2 SUTUPAK TIE 60IN SA8H 2STRAND

## (undated) DEVICE — TUBING, SUCTION, 1/4" X 10', STRAIGHT: Brand: MEDLINE

## (undated) DEVICE — SUT PROLN 3/0 V7 D/A 36IN 8976H

## (undated) DEVICE — CANN VESL DLP 1WY BLNT/TP 3MM